# Patient Record
Sex: FEMALE | Race: WHITE | NOT HISPANIC OR LATINO | Employment: OTHER | URBAN - METROPOLITAN AREA
[De-identification: names, ages, dates, MRNs, and addresses within clinical notes are randomized per-mention and may not be internally consistent; named-entity substitution may affect disease eponyms.]

---

## 2017-01-10 ENCOUNTER — PATIENT OUTREACH (OUTPATIENT)
Dept: OTHER | Facility: OTHER | Age: 62
End: 2017-01-10
Payer: COMMERCIAL

## 2017-01-10 NOTE — PROGRESS NOTES
Last 5 Patient Entered Readings                                                               Current 30 Day Average: 119/75     Recent Readings 12/14/2016 12/14/2016 12/2/2016 12/2/2016 11/23/2016    Systolic BP (mmHg) 120 119 118 114 112    Diastolic BP (mmHg) 78 73 66 67 61    Pulse 83 84 77 79 84        LVM to follow up with Mrs. Jyoti Tyler. Inquired about the lack of readings and reminded her that we need to see readings at least once per week. Requested that she start taking readings again, make sure she is still logged into her [employer] jennifer and to let me know if she has experiencing any other IT issues. Advised pt to call or message back if she has any questions or concerns.

## 2017-01-13 ENCOUNTER — PATIENT MESSAGE (OUTPATIENT)
Dept: ALLERGY | Facility: CLINIC | Age: 62
End: 2017-01-13

## 2017-01-13 RX ORDER — OMEPRAZOLE 40 MG/1
40 CAPSULE, DELAYED RELEASE ORAL DAILY
Qty: 30 CAPSULE | Refills: 0 | Status: SHIPPED | OUTPATIENT
Start: 2017-01-13 | End: 2017-07-12 | Stop reason: SDUPTHER

## 2017-01-19 NOTE — PROGRESS NOTES
Last 5 Patient Entered Readings                                                               Current 30 Day Average: /     Recent Readings 12/14/2016 12/14/2016 12/2/2016 12/2/2016 11/23/2016    Systolic BP (mmHg) 120 119 118 114 112    Diastolic BP (mmHg) 78 73 66 67 61    Pulse 83 84 77 79 84        Sent Galenea message to follow up with Mrs. Jyoti Tyler. Inquiring again about her lack of readings and requested that she start taking them. Informed her to let me know if she is having any IT issues so we can assist her. Advised pt to call or message back if he has any questions or concerns.

## 2017-02-08 ENCOUNTER — PATIENT OUTREACH (OUTPATIENT)
Dept: OTHER | Facility: OTHER | Age: 62
End: 2017-02-08
Payer: COMMERCIAL

## 2017-02-08 NOTE — PROGRESS NOTES
Last 5 Patient Entered Readings                                                               Current 30 Day Average: 107/68     Recent Readings 1/19/2017 12/14/2016 12/14/2016 12/2/2016 12/2/2016    Systolic BP (mmHg) 107 120 119 118 114    Diastolic BP (mmHg) 68 78 73 66 67    Pulse 82 83 84 77 79        LVM to follow up with Mrs. Jyoti Tyler. Inquired about how her low sodium diet and exercise is going. Wanted to remind her to take her weekly readings and that a requirement of the program is to take at least one week per week. Also inquired about whether or not she is still interested in being in the program. Will continue to follow. Advised pt to call or message back if she has any questions or concerns.

## 2017-03-07 ENCOUNTER — PATIENT OUTREACH (OUTPATIENT)
Dept: OTHER | Facility: OTHER | Age: 62
End: 2017-03-07
Payer: COMMERCIAL

## 2017-03-15 NOTE — PROGRESS NOTES
Called patient to review readings. LVM.  BP at goal  Continue monitoring    Last 5 Patient Entered Readings                                                               Current 30 Day Average: 113/69     Recent Readings 3/12/2017 3/12/2017 2/22/2017 2/22/2017 2/14/2017    Systolic BP (mmHg) 111 113 116 117 108    Diastolic BP (mmHg) 67 67 71 70 61    Pulse 79 81 76 76 76

## 2017-03-24 ENCOUNTER — PATIENT OUTREACH (OUTPATIENT)
Dept: OTHER | Facility: OTHER | Age: 62
End: 2017-03-24
Payer: COMMERCIAL

## 2017-03-24 NOTE — PROGRESS NOTES
Last 5 Patient Entered Readings                                                               Current 30 Day Average: 111/67     Recent Readings 3/21/2017 3/21/2017 3/12/2017 3/12/2017 2/22/2017    Systolic BP (mmHg) 105 110 111 113 116    Diastolic BP (mmHg) 60 69 67 67 71    Pulse 80 80 79 81 76        LVM to follow up with Mrs. Jyoti Tyler. Inquired about how her low sodium diet and exercise is going. Overall, her readings are looking good and I encouraged her to continue taking them weekly. Advised pt to call or message back if she has any questions or concerns.

## 2017-03-30 DIAGNOSIS — I10 ESSENTIAL HYPERTENSION: ICD-10-CM

## 2017-04-04 RX ORDER — VALSARTAN 160 MG/1
TABLET ORAL
Qty: 30 TABLET | Refills: 1 | Status: SHIPPED | OUTPATIENT
Start: 2017-04-04 | End: 2017-04-20 | Stop reason: SDUPTHER

## 2017-04-20 ENCOUNTER — PATIENT OUTREACH (OUTPATIENT)
Dept: OTHER | Facility: OTHER | Age: 62
End: 2017-04-20
Payer: COMMERCIAL

## 2017-04-20 DIAGNOSIS — I10 ESSENTIAL HYPERTENSION: ICD-10-CM

## 2017-04-20 RX ORDER — VALSARTAN 160 MG/1
160 TABLET ORAL DAILY
Qty: 90 TABLET | Refills: 2 | Status: SHIPPED | OUTPATIENT
Start: 2017-04-20 | End: 2017-07-12 | Stop reason: SDUPTHER

## 2017-04-20 NOTE — PROGRESS NOTES
Patient called to request refill.   BP at goal  Continue monitoring    Last 5 Patient Entered Readings                                                               Current 30 Day Average: 118/73     Recent Readings 4/15/2017 4/15/2017 4/10/2017 4/10/2017 3/31/2017    Systolic BP (mmHg) 134 139 135 133 108    Diastolic BP (mmHg) 78 82 88 89 71    Pulse 78 80 80 85 76

## 2017-05-01 ENCOUNTER — OFFICE VISIT (OUTPATIENT)
Dept: OPTOMETRY | Facility: CLINIC | Age: 62
End: 2017-05-01
Payer: COMMERCIAL

## 2017-05-01 DIAGNOSIS — L03.213 PRESEPTAL CELLULITIS OF LEFT UPPER EYELID: Primary | ICD-10-CM

## 2017-05-01 PROCEDURE — 99999 PR PBB SHADOW E&M-EST. PATIENT-LVL II: CPT | Mod: PBBFAC,,, | Performed by: OPTOMETRIST

## 2017-05-01 PROCEDURE — 92012 INTRM OPH EXAM EST PATIENT: CPT | Mod: S$GLB,,, | Performed by: OPTOMETRIST

## 2017-05-01 RX ORDER — HYDROCHLOROTHIAZIDE 25 MG/1
25 TABLET ORAL DAILY
Refills: 1 | COMMUNITY
Start: 2017-04-20 | End: 2017-06-30 | Stop reason: SDUPTHER

## 2017-05-01 RX ORDER — AMOXICILLIN 500 MG/1
500 TABLET, FILM COATED ORAL EVERY 12 HOURS
Qty: 20 TABLET | Refills: 0 | Status: SHIPPED | OUTPATIENT
Start: 2017-05-01 | End: 2017-05-11

## 2017-05-01 NOTE — PROGRESS NOTES
HPI     Patient's last dilated exam was: 8/29/2016  Pt states: woke up Sunday morning and her HANNAH was swollen, tenser/sore.   Has not treated. Went to a crawfish boil the day before, does not think it   is from that. Patient denies flashes, floaters, and double vision.          Last edited by NOÉ Ward on 5/1/2017  1:20 PM.     ROS     Positive for: Eyes    Negative for: Constitutional, Gastrointestinal, Neurological, Skin,   Genitourinary, Musculoskeletal, HENT, Endocrine, Cardiovascular,   Respiratory, Psychiatric, Allergic/Imm, Heme/Lymph    Last edited by Letty Liriano, KELLY on 5/1/2017  1:52 PM. (History)        Assessment /Plan     For exam results, see Encounter Report.    Preseptal cellulitis of left upper eyelid  -     amoxicillin (AMOXIL) 500 MG Tab; Take 1 tablet (500 mg total) by mouth every 12 (twelve) hours.  Dispense: 20 tablet; Refill: 0            1.    Amoxicillin 500mg PO bid x 10 days-educated pt if condition continues to worsen RTC immediately.  Bifocal rx given.  Phone review 5 days.

## 2017-05-05 ENCOUNTER — TELEPHONE (OUTPATIENT)
Dept: OPTOMETRY | Facility: CLINIC | Age: 62
End: 2017-05-05

## 2017-05-24 ENCOUNTER — PATIENT OUTREACH (OUTPATIENT)
Dept: OTHER | Facility: OTHER | Age: 62
End: 2017-05-24
Payer: COMMERCIAL

## 2017-05-24 NOTE — PROGRESS NOTES
Last 5 Patient Entered Readings                                                               Current 30 Day Average: 112/65     Recent Readings 5/16/2017 5/16/2017 5/12/2017 5/12/2017 5/6/2017    Systolic BP (mmHg) 128 122 102 105 119    Diastolic BP (mmHg) 65 69 63 70 59    Pulse 83 85 80 80 60        LVM to follow up with Mrs. Jyoti Tyler. Inquired about how her low sodium diet and exercise is going. Overall, his readings are looking good so I just reminded her to take her readings weekly. She has done better with this so I want to keep her on track. Advised pt to call or message back if she has any questions or concerns.

## 2017-06-27 ENCOUNTER — PATIENT OUTREACH (OUTPATIENT)
Dept: OTHER | Facility: OTHER | Age: 62
End: 2017-06-27

## 2017-06-27 NOTE — PROGRESS NOTES
Last 5 Patient Entered Readings                                                               Current 30 Day Average: 121/74     Recent Readings 6/23/2017 6/23/2017 6/14/2017 6/14/2017 6/4/2017    Systolic BP (mmHg) 126 136 114 113 126    Diastolic BP (mmHg) 82 83 72 73 83    Pulse 80 81 77 78 75          Follow up with Mrs. Jyoti Tyler completed. Patient is maintaining a low sodium diet and continuing her exercise regime. She reports that she is doing well and feeling great. She continues taking her readings weekly. Patient did not have any further questions or concerns. I will follow up in a few weeks to see how she is doing and progressing.

## 2017-06-30 DIAGNOSIS — I10 ESSENTIAL HYPERTENSION: Primary | ICD-10-CM

## 2017-06-30 RX ORDER — HYDROCHLOROTHIAZIDE 25 MG/1
25 TABLET ORAL DAILY
Qty: 90 TABLET | Refills: 2 | Status: SHIPPED | OUTPATIENT
Start: 2017-06-30 | End: 2017-07-12 | Stop reason: SDUPTHER

## 2017-06-30 NOTE — TELEPHONE ENCOUNTER
Patient requests HCTZ RX be sent to [facility] on [address].   BP at goal  Continue monitoring

## 2017-07-12 ENCOUNTER — HOSPITAL ENCOUNTER (OUTPATIENT)
Dept: RADIOLOGY | Facility: HOSPITAL | Age: 62
Discharge: HOME | End: 2017-07-12
Attending: INTERNAL MEDICINE
Payer: COMMERCIAL

## 2017-07-12 ENCOUNTER — OFFICE VISIT (OUTPATIENT)
Dept: INTERNAL MEDICINE | Facility: CLINIC | Age: 62
End: 2017-07-12
Payer: COMMERCIAL

## 2017-07-12 VITALS — BODY MASS INDEX: 27.31 KG/M2 | WEIGHT: 160 LBS | HEIGHT: 64 IN

## 2017-07-12 VITALS
DIASTOLIC BLOOD PRESSURE: 62 MMHG | HEIGHT: 64 IN | WEIGHT: 160.5 LBS | HEART RATE: 84 BPM | SYSTOLIC BLOOD PRESSURE: 110 MMHG | BODY MASS INDEX: 27.4 KG/M2

## 2017-07-12 DIAGNOSIS — I10 ESSENTIAL HYPERTENSION: ICD-10-CM

## 2017-07-12 DIAGNOSIS — R93.89 ABNORMAL CT SCAN, CHEST: ICD-10-CM

## 2017-07-12 DIAGNOSIS — R91.1 PULMONARY NODULE: ICD-10-CM

## 2017-07-12 DIAGNOSIS — Z12.39 SCREENING FOR BREAST CANCER: ICD-10-CM

## 2017-07-12 DIAGNOSIS — M85.80 OSTEOPENIA, UNSPECIFIED LOCATION: ICD-10-CM

## 2017-07-12 DIAGNOSIS — K21.9 LPRD (LARYNGOPHARYNGEAL REFLUX DISEASE): ICD-10-CM

## 2017-07-12 DIAGNOSIS — Z79.899 LONG-TERM USE OF HIGH-RISK MEDICATION: ICD-10-CM

## 2017-07-12 DIAGNOSIS — Z00.00 ANNUAL PHYSICAL EXAM: Primary | ICD-10-CM

## 2017-07-12 PROCEDURE — 99999 PR PBB SHADOW E&M-EST. PATIENT-LVL IV: CPT | Mod: PBBFAC,,, | Performed by: INTERNAL MEDICINE

## 2017-07-12 PROCEDURE — 77067 SCR MAMMO BI INCL CAD: CPT | Mod: 26,,, | Performed by: RADIOLOGY

## 2017-07-12 PROCEDURE — 77067 SCR MAMMO BI INCL CAD: CPT | Mod: TC

## 2017-07-12 PROCEDURE — 99396 PREV VISIT EST AGE 40-64: CPT | Mod: S$GLB,,, | Performed by: INTERNAL MEDICINE

## 2017-07-12 RX ORDER — HYDROCHLOROTHIAZIDE 25 MG/1
25 TABLET ORAL DAILY
Qty: 90 TABLET | Refills: 3 | Status: SHIPPED | OUTPATIENT
Start: 2017-07-12 | End: 2018-07-13 | Stop reason: SDUPTHER

## 2017-07-12 RX ORDER — OMEPRAZOLE 20 MG/1
40 CAPSULE, DELAYED RELEASE ORAL
Qty: 180 CAPSULE | Refills: 1 | Status: SHIPPED | OUTPATIENT
Start: 2017-07-12 | End: 2019-06-19

## 2017-07-12 RX ORDER — VALSARTAN 80 MG/1
80 TABLET ORAL EVERY MORNING
Qty: 90 TABLET | Refills: 3 | Status: SHIPPED | OUTPATIENT
Start: 2017-07-12 | End: 2018-07-13 | Stop reason: SDUPTHER

## 2017-07-12 NOTE — PATIENT INSTRUCTIONS
How Acid Reflux Affects Your Throat    Do you have to clear your throat or cough often? Are you hoarse? Do you have trouble swallowing? If you have these or other throat symptoms, you may have acid reflux. This occurs when stomach acid flows back up and irritates your throat.  Why you have throat symptoms  There are muscles (esophageal sphincters) at both ends of the tube that carries food to your stomach (the esophagus). These muscles relax to let food pass. Then they tighten to keep stomach acid down. When the lower esophageal sphincter (LES) doesnt tighten enough, acid can flow back (reflux) from your stomach into your esophagus. This may cause heartburn. In some cases the upper esophageal sphincter (UES) also doesnt work well. Then acid can travel higher and enter your throat (pharynx). In many cases, this causes throat symptoms.  Common throat symptoms  · Need to clear your throat often  · Feeling like youre choking  · Long-term (chronic) cough  · Hoarseness  · Trouble swallowing  · Feel like you have a lump in your throat  · Sour or acid taste  · Sore throat that keeps coming back   Date Last Reviewed: 7/1/2016  © 6905-1358 [employer]. [address] [place] [address], [place] [address]. All rights reserved. This information is not intended as a substitute for professional medical care. Always follow your healthcare professional's instructions.        Tips to Control Acid Reflux    To control acid reflux, youll need to make some basic diet and lifestyle changes. The simple steps outlined below may be all youll need to ease discomfort.  Watch what you eat  · Avoid fatty foods and spicy foods.  · Eat fewer acidic foods, such as citrus and tomato-based foods. These can increase symptoms.  · Limit drinking alcohol, caffeine, and fizzy beverages. All increase acid reflux.  · Try limiting chocolate, peppermint, and spearmint. These can worsen acid reflux in some people.  Watch when you eat  · Avoid lying  down for 3 hours after eating.  · Do not snack before going to bed.  Raise your head  Raising your head and upper body by 4 to 6 inches helps limit reflux when youre lying down. Put blocks under the head of your bed frame to raise it.  Other changes  · Lose weight, if you need to  · Dont exercise near bedtime  · Avoid tight-fitting clothes  · Limit aspirin and ibuprofen  · Stop smoking   Date Last Reviewed: 7/1/2016 © 2000-2016 [employer]. [address] [place] [address], [place] [address]. All rights reserved. This information is not intended as a substitute for professional medical care. Always follow your healthcare professional's instructions.        Lifestyle Changes for Controlling GERD    When you have GERD, stomach acid feels as if its backing up toward your mouth. Whether or not you take medicine to control your GERD, your symptoms can often be improved with lifestyle changes. Talk to your healthcare provider about the following suggestions. These suggestions may help you get relief from your symptoms.  Raise your head  Reflux is more likely to strike when youre lying down flat, because stomach fluid can flow backward more easily. Raising the head of your bed 4 to 6 inches can help. To do this:  · Slide blocks or books under the legs at the head of your bed. Or, place a wedge under the mattress. Many [employer] stores can make a suitable wedge for you. The wedge should run from your waist to the top of your head.  · Dont just prop your head on several pillows. This increases pressure on your stomach. It can make GERD worse.  Watch your eating habits  Certain foods may increase the acid in your stomach or relax the lower esophageal sphincter. This makes GERD more likely. Its best to avoid the following if they cause you symptoms:  · Coffee, tea, and carbonated drinks (with and without caffeine)  · Fatty, fried, or spicy food  · Mint, chocolate, onions, and tomatoes  · Peppermint  · Any other foods  that seem to irritate your stomach or cause you pain  Relieve the pressure  Tips include the following:  · Eat smaller meals, even if you have to eat more often.  · Dont lie down right after you eat. Wait a few hours for your stomach to empty.  · Avoid tight belts and tight-fitting clothes.  · Lose excess weight.  Tobacco and alcohol  Avoid smoking tobacco and drinking alcohol. They can make GERD symptoms worse.  Date Last Reviewed: 7/1/2016  © 4349-5027 [employer]. [address] [place] [address], [place] [address]. All rights reserved. This information is not intended as a substitute for professional medical care. Always follow your healthcare professional's instructions.

## 2017-07-14 NOTE — PROGRESS NOTES
Subjective:       Patient ID: Jyoti Tyler is a 62 y.o. female.    Chief Complaint: Annual Exam   wellness  Entire chart reviewed, PMx, FHx, and Social History updated and reviewed.    HPI  Review of Systems   Constitutional: Negative for activity change, appetite change, chills, fatigue, fever and unexpected weight change.   HENT: Negative for dental problem, hearing loss, rhinorrhea, tinnitus, trouble swallowing and voice change.    Eyes: Negative for discharge and visual disturbance.   Respiratory: Negative for cough, chest tightness, shortness of breath and wheezing.    Cardiovascular: Negative for chest pain, palpitations and leg swelling.   Gastrointestinal: Negative for abdominal pain, blood in stool, constipation, diarrhea, nausea and vomiting.   Endocrine: Negative for polydipsia and polyuria.   Genitourinary: Negative for difficulty urinating, dysuria, frequency, hematuria, menstrual problem and urgency.   Musculoskeletal: Negative for arthralgias, back pain, gait problem, joint swelling, myalgias, neck pain and neck stiffness.   Skin: Negative for rash.   Neurological: Negative for dizziness, tremors, speech difficulty, weakness, light-headedness and headaches.   Psychiatric/Behavioral: Negative for confusion, dysphoric mood and sleep disturbance. The patient is not nervous/anxious.        Objective:      Physical Exam   Constitutional: She appears well-nourished.   HENT:   Head: Atraumatic.   Eyes: Conjunctivae are normal. No scleral icterus.   Neck: Neck supple.   Cardiovascular: Normal rate and regular rhythm.    Pulmonary/Chest: Effort normal and breath sounds normal.   Abdominal: Soft. There is no tenderness.   Musculoskeletal: She exhibits no edema.   Lymphadenopathy:     She has no cervical adenopathy.   Neurological: She is alert.   Skin: Skin is warm and dry.   Psychiatric: She has a normal mood and affect. Her behavior is normal.       Assessment:       1. Annual physical exam    2.  Screening for breast cancer    3. Essential hypertension    4. LPRD (laryngopharyngeal reflux disease)    5. Long-term use of high-risk medication, PPI    6. Osteopenia, unspecified location    7. Pulmonary nodule    8. Abnormal CT scan, chest        Plan:   Jyoti was seen today for annual exam.    Diagnoses and all orders for this visit:    Annual physical exam  -     CBC auto differential; Future  -     Comprehensive metabolic panel; Future  -     Lipid panel; Future  -     TSH; Future    Screening for breast cancer  -     Mammo Digital Screening Bilat with CAD; Future    Essential hypertension  -     valsartan (DIOVAN) 80 MG tablet; Take 1 tablet (80 mg total) by mouth every morning.  -     hydrochlorothiazide (HYDRODIURIL) 25 MG tablet; Take 1 tablet (25 mg total) by mouth once daily.    LPRD (laryngopharyngeal reflux disease)    Long-term use of high-risk medication, PPI    Osteopenia, unspecified location  -     DXA Bone Density Spine And Hip; Future    Pulmonary nodule  Comments:  two toyin 07-  screenig for CAD, CALCIUM SCORE  Orders:  -     CT Chest Without Contrast; Future    Abnormal CT scan, chest  -     CT Chest Without Contrast; Future    Other orders  -     omeprazole (PRILOSEC) 20 MG capsule; Take 2 capsules (40 mg total) by mouth 2 (two) times daily before meals. As needed, trial to wean off  -     ranitidine (ZANTAC) 150 MG tablet; Take 1 tablet (150 mg total) by mouth 2 (two) times daily.  -     calcium carbonate-magnesium hydroxide (ROLAIDS) 550-110 mg Chew; Take 1 tablet by mouth 2 (two) times daily with meals.    Return in about 6 months (around 1/12/2018).

## 2017-08-08 ENCOUNTER — PATIENT OUTREACH (OUTPATIENT)
Dept: OTHER | Facility: OTHER | Age: 62
End: 2017-08-08

## 2017-08-08 NOTE — PROGRESS NOTES
Last 5 Patient Entered Red[place] Current 30 Day Average: 122/69     Recent Readings 7/27/2017 7/27/2017 7/18/2017 7/18/2017 7/1/2017    Systolic BP (mmHg) 133 129 110 121 115    Diastolic BP (mmHg) 77 81 60 67 63    Pulse 76 83 85 88 79          LVM to follow up with Mrs. Jyoti Tyler. Inquired about how her low sodium diet and exercise is going. Advised pt to call or message back if she has any questions or concerns.    Are the patients BP readings overall controlled? yes  Does the patient take weekly BP readings? no : Patient was doing better with this but I reminded her to continue working on getting at least one reading in every week so we can continue monitoring her.   Did you request for the patient to call or message you back? no  If yes, then why? NA    Reminded patient about what to do incase of a medical emergency (call 911, call [facility] on call or go to the ER).     Provided patient with my contact information.

## 2017-08-22 ENCOUNTER — PATIENT OUTREACH (OUTPATIENT)
Dept: OTHER | Facility: OTHER | Age: 62
End: 2017-08-22

## 2017-08-22 NOTE — PROGRESS NOTES
Jyoti Tyler continues to monitor BP readings. Tolerating medications without problems. No questions or concerns.    Last 5 Patient Entered Redings Current 30 Day Average: 126/70     Recent Readings 8/16/2017 8/16/2017 7/27/2017 7/27/2017 7/18/2017    Systolic BP (mmHg) 118 110 133 129 110    Diastolic BP (mmHg) 62 61 77 81 60    Pulse 81 83 76 83 85          BP at goal  Continue monitoring    Hypertension Medications             hydrochlorothiazide (HYDRODIURIL) 25 MG tablet Take 1 tablet (25 mg total) by mouth once daily.    valsartan (DIOVAN) 80 MG tablet Take 1 tablet (80 mg total) by mouth every morning.

## 2017-09-05 ENCOUNTER — PATIENT OUTREACH (OUTPATIENT)
Dept: OTHER | Facility: OTHER | Age: 62
End: 2017-09-05

## 2017-09-29 ENCOUNTER — PATIENT OUTREACH (OUTPATIENT)
Dept: OTHER | Facility: OTHER | Age: 62
End: 2017-09-29

## 2017-09-29 NOTE — PROGRESS NOTES
Last 5 Patient Entered Redings Current 30 Day Average: 128/67     Recent Readings 9/12/2017 9/12/2017 8/27/2017 8/27/2017 8/27/2017    Systolic BP (mmHg) 128 129 114 131 110    Diastolic BP (mmHg) 67 73 73 74 74    Pulse 74 78 74 80 78          Hypertension Digital Medicine Program (HDMP): Health  Follow Up    Lifestyle Modifications:    1.Low sodium diet: yes    2.Physical activity: yes     3.Hypotension/Hypertension symptoms: no   Frequency/Alleviating factors/Precipitating factors, etc.     4.Patient has been compliant with the medication regimen.     Patient informed me that she sometimes has issues getting her cuff to connect to her phone through the bluetooth. She is going to try and take her BP reading this week and she will let me know if she continues to have issues. If so, I will have IT reach out or she may need to bring the cuff to the [facility].     Follow up with Mrs. Jyoti Tyler completed. No further questions or concerns. I will follow up in a few weeks to assess progress.

## 2017-10-24 ENCOUNTER — PATIENT OUTREACH (OUTPATIENT)
Dept: OTHER | Facility: OTHER | Age: 62
End: 2017-10-24

## 2017-10-24 NOTE — PROGRESS NOTES
Last 5 Patient Entered Redings Current 30 Day Average: 126/76     Recent Readings 10/15/2017 10/15/2017 10/4/2017 10/4/2017 9/12/2017    Systolic BP (mmHg) 118 115 134 132 128    Diastolic BP (mmHg) 65 63 87 81 67    Pulse 80 80 83 85 74          Hypertension Digital Medicine Program (HDMP): Health  Follow Up    Lifestyle Modifications:    1.Low sodium diet: Deferred    2.Physical activity: Deferred    3.Hypotension/Hypertension symptoms: no   Frequency/Alleviating factors/Precipitating factors, etc.     4.Patient has been compliant with the medication regimen.     Patient continues to forget to take her readings because she is busy. She will continue to work on this. Patient was unable to talk to long but she will call me if she has any other issues with her cuff.     Follow up with Mrs. Jyoti Tyler completed. No further questions or concerns. I will follow up in a few weeks to assess progress.

## 2017-11-27 ENCOUNTER — PATIENT OUTREACH (OUTPATIENT)
Dept: OTHER | Facility: OTHER | Age: 62
End: 2017-11-27

## 2017-11-27 NOTE — PROGRESS NOTES
Last 5 Patient Entered Readings Current 30 Day Average: 122/73     Recent Readings 11/13/2017 10/30/2017 10/30/2017 10/26/2017 10/26/2017    Systolic BP (mmHg) 118 126 124 122 117    Diastolic BP (mmHg) 67 79 80 70 74    Pulse 82 84 83 86 87          Hypertension Digital Medicine (HDMP) Health  Follow Up    LVM to follow up with Mrs. Jyoti Tyler.    Per 30 day average, blood pressure is well controlled 122/73 mmHg but she does not take readings consistently. Reminded her that it is a requirement of the program for her to take 1-2 readings per week. Encouraged adherence to low sodium diet and physical activity guidelines. Advised patient to call or message with questions or concerns.

## 2017-12-28 ENCOUNTER — PATIENT OUTREACH (OUTPATIENT)
Dept: OTHER | Facility: OTHER | Age: 62
End: 2017-12-28

## 2017-12-28 NOTE — PROGRESS NOTES
Jyoti Tyler continues to monitor BP readings. Tolerating medications without problems. Has forgotten to take readings, but will do so soon.  No questions or concerns.    Last 5 Patient Entered Readings Current 30 Day Average: 113/71     Recent Readings 12/19/2017 12/19/2017 12/11/2017 12/4/2017 11/13/2017    SBP (mmHg) 122 123 103 113 118    DBP (mmHg) 78 75 66 69 67    Pulse 84 83 81 77 82          BP at goal, <130/80 mmHg  Continue monitoring    Hypertension Medications             hydrochlorothiazide (HYDRODIURIL) 25 MG tablet Take 1 tablet (25 mg total) by mouth once daily.    valsartan (DIOVAN) 80 MG tablet Take 1 tablet (80 mg total) by mouth every morning.

## 2018-01-30 ENCOUNTER — PATIENT OUTREACH (OUTPATIENT)
Dept: OTHER | Facility: OTHER | Age: 63
End: 2018-01-30

## 2018-01-30 NOTE — PROGRESS NOTES
Last 5 Patient Entered Readings                                      Current 30 Day Average: 123/83     Recent Readings 1/23/2018 1/23/2018 1/9/2018 12/28/2017 12/19/2017    SBP (mmHg) 122 121 123 106 122    DBP (mmHg) 78 72 88 67 78    Pulse 79 70 84 82 84          Hypertension Digital Medicine (HDMP) Health  Follow Up    LVM to follow up with Mrs. Jyoti Tyler.    Per 30 day average, blood pressure is well controlled 123/83 mmHg (I believe her average is a little off due to lack of readings and her diastolic number being high on 1/9). Encouraged adherence to low sodium diet and physical activity guidelines. Advised patient to call or message with questions or concerns. Requested she start taking 1-3 BP readings per week.

## 2018-02-26 ENCOUNTER — PATIENT OUTREACH (OUTPATIENT)
Dept: OTHER | Facility: OTHER | Age: 63
End: 2018-02-26

## 2018-02-26 NOTE — PROGRESS NOTES
Last 5 Patient Entered Readings                                      Current 30 Day Average: 141/89     Recent Readings 2/20/2018 1/23/2018 1/23/2018 1/9/2018 12/28/2017    SBP (mmHg) 141 122 121 123 106    DBP (mmHg) 89 78 72 88 67    Pulse 85 79 70 84 82          Hypertension Digital Medicine (HDMP) Health  Follow Up    LVM to follow up with Mrs. Jyoti Tyler.    Per 30 day average, blood pressure is not well controlled 141/89 mmHg. Requested patient takes more readings each month (2-3 a week). Also reminded her that this is a requirement of our program. Encouraged adherence to low sodium diet and physical activity guidelines. Requested patient call or message back so we can discuss her readings/obtain an update. Will continue to monitor/follow up.

## 2018-03-22 NOTE — PROGRESS NOTES
Last 5 Patient Entered Readings                                      Current 30 Day Average: 128/80     Recent Readings 3/20/2018 3/20/2018 3/7/2018 3/7/2018 3/1/2018    SBP (mmHg) 124 121 127 109 121    DBP (mmHg) 80 77 77 68 72    Pulse 76 76 80 79 80          Hypertension Digital Medicine (HDMP) Health  Follow Up    LVM to follow up with Mrs. Jyoti Eubanks Nayeli.    Per 30 day average, blood pressure is controlled 128/80 mmHg. Encouraged adherence to low sodium diet and physical activity guidelines. Advised patient to call or message with questions or concerns.

## 2018-04-06 ENCOUNTER — PATIENT OUTREACH (OUTPATIENT)
Dept: OTHER | Facility: OTHER | Age: 63
End: 2018-04-06

## 2018-04-06 NOTE — PROGRESS NOTES
Last 5 Patient Entered Readings                                      Current 30 Day Average: 126/79     Recent Readings 3/20/2018 3/20/2018 3/7/2018 3/7/2018 3/1/2018    SBP (mmHg) 124 121 127 109 121    DBP (mmHg) 80 77 77 68 72    Pulse 76 76 80 79 80          Hypertension Digital Medicine (HDMP) Health  Follow Up    LVM to follow up with Melida Jyoti Eubanks Nayeli.    Per 30 day average, blood pressure is not well controlled due to a lack of readings. Encouraged adherence to low sodium diet and physical activity guidelines. Requested patient call or message back so we can discuss her readings/obtain an update. Will continue to monitor/follow up.

## 2018-05-08 NOTE — PROGRESS NOTES
Last 5 Patient Entered Readings                                      Current 30 Day Average:      Recent Readings 3/20/2018 3/20/2018 3/7/2018 3/7/2018 3/1/2018    SBP (mmHg) 124 121 127 109 121    DBP (mmHg) 80 77 77 68 72    Pulse 76 76 80 79 80            Digital Medicine: Health  Follow Up    Left voicemail to follow up with Mrs. Jyoti Tyler.  Starting non-compliance protocol today.

## 2018-05-23 NOTE — PROGRESS NOTES
Last 5 Patient Entered Readings                                      Current 30 Day Average:      Recent Readings 3/20/2018 3/20/2018 3/7/2018 3/7/2018 3/1/2018    SBP (mmHg) 124 121 127 109 121    DBP (mmHg) 80 77 77 68 72    Pulse 76 76 80 79 80          Called and spoke with patient about her lack of BP readings. She told me that she just took a reading on her [place] cuff a few days ago. I asked if she were still logged into her [employer] jennifer but she looked on her phone and told me that she did not have that jennifer on her phone and does not think she ever did. I will have our , Linnea, reach out for further assistance. I encouraged the patient to continue taking BP readings. She also informed me that she is doing well and that her BP is running in the 110's.

## 2018-06-01 NOTE — PROGRESS NOTES
Reviewed readings. BP well managed, goal <130/80 mmHg  Continue monitoring    Last 5 Patient Entered Readings                                      Current 30 Day Average: 116/73     Recent Readings 5/24/2018 3/20/2018 3/20/2018 3/7/2018 3/7/2018    SBP (mmHg) 116 124 121 127 109    DBP (mmHg) 73 80 77 77 68    Pulse 74 76 76 80 79

## 2018-06-20 ENCOUNTER — PATIENT OUTREACH (OUTPATIENT)
Dept: OTHER | Facility: OTHER | Age: 63
End: 2018-06-20

## 2018-06-20 NOTE — PROGRESS NOTES
Last 5 Patient Entered Readings                                      Current 30 Day Average: 114/74     Recent Readings 6/18/2018 6/18/2018 5/24/2018 3/20/2018 3/20/2018    SBP (mmHg) 112 120 116 124 121    DBP (mmHg) 74 75 73 80 77    Pulse 78 80 74 76 76          Digital Medicine: Health  Follow Up    Lifestyle Modifications:    1.Dietary Modifications (Sodium intake <2,000mg/day, food labels, dining out): Deferred    2.Physical Activity: Deferred    3.Medication Therapy: Patient has been compliant with the medication regimen. Patient reports that she is doing well on her current regimen.     4.Patient has the following medication side effects/concerns:   (Frequency/Alleviating factors/Precipitating factors, etc.)     Requested that she work on getting more readings in each week.     Follow up with Mrs. Jyoti Tyler completed. No further questions or concerns. Will continue follow up to achieve health goals.

## 2018-07-13 ENCOUNTER — PATIENT OUTREACH (OUTPATIENT)
Dept: OTHER | Facility: OTHER | Age: 63
End: 2018-07-13

## 2018-07-13 DIAGNOSIS — I10 ESSENTIAL HYPERTENSION: ICD-10-CM

## 2018-07-13 RX ORDER — VALSARTAN 80 MG/1
80 TABLET ORAL EVERY MORNING
Qty: 90 TABLET | Refills: 3 | Status: SHIPPED | OUTPATIENT
Start: 2018-07-13 | End: 2018-07-17 | Stop reason: ALTCHOICE

## 2018-07-13 RX ORDER — HYDROCHLOROTHIAZIDE 25 MG/1
25 TABLET ORAL DAILY
Qty: 90 TABLET | Refills: 3 | Status: SHIPPED | OUTPATIENT
Start: 2018-07-13 | End: 2018-07-17 | Stop reason: SDUPTHER

## 2018-07-13 RX ORDER — HYDROCHLOROTHIAZIDE 25 MG/1
25 TABLET ORAL DAILY
Qty: 90 TABLET | Refills: 3 | Status: SHIPPED | OUTPATIENT
Start: 2018-07-13 | End: 2018-07-13 | Stop reason: SDUPTHER

## 2018-07-13 RX ORDER — VALSARTAN 80 MG/1
80 TABLET ORAL EVERY MORNING
Qty: 90 TABLET | Refills: 3 | Status: SHIPPED | OUTPATIENT
Start: 2018-07-13 | End: 2018-07-13 | Stop reason: SDUPTHER

## 2018-07-13 NOTE — PROGRESS NOTES
Last 5 Patient Entered Readings                                      Current 30 Day Average: 112/74     Recent Readings 6/18/2018 6/18/2018 5/24/2018 3/20/2018 3/20/2018    SBP (mmHg) 112 120 116 124 121    DBP (mmHg) 74 75 73 80 77    Pulse 78 80 74 76 76          Patient called and M for me asking that I give her a call back. I attempted to reach her but had to M as well. Requested that she call me back at her earliest convenience.     Spoke with patient after she called back again. She just needs a refill on her Valsartan and HCTZ. She would like it sent to the [facility] on [address]. I will inform her PharmD, CANDY Jamison, of this information.

## 2018-07-13 NOTE — TELEPHONE ENCOUNTER
Dr. Eaton, did you intend for all 3 of her scripts to print? Cause I just took them off the printer.    Please advise  Thank you  JERONIMO Betts

## 2018-07-17 ENCOUNTER — PATIENT OUTREACH (OUTPATIENT)
Dept: OTHER | Facility: OTHER | Age: 63
End: 2018-07-17

## 2018-07-17 DIAGNOSIS — I10 ESSENTIAL HYPERTENSION: Primary | ICD-10-CM

## 2018-07-17 RX ORDER — IRBESARTAN 75 MG/1
75 TABLET ORAL DAILY
Qty: 90 TABLET | Refills: 1 | Status: SHIPPED | OUTPATIENT
Start: 2018-07-17 | End: 2018-07-20 | Stop reason: SDUPTHER

## 2018-07-17 RX ORDER — HYDROCHLOROTHIAZIDE 25 MG/1
25 TABLET ORAL DAILY
Qty: 90 TABLET | Refills: 1 | OUTPATIENT
Start: 2018-07-17 | End: 2018-07-20 | Stop reason: SDUPTHER

## 2018-07-20 RX ORDER — IRBESARTAN 75 MG/1
75 TABLET ORAL DAILY
Qty: 90 TABLET | Refills: 1 | Status: SHIPPED | OUTPATIENT
Start: 2018-07-20 | End: 2018-07-20 | Stop reason: ALTCHOICE

## 2018-07-20 RX ORDER — HYDROCHLOROTHIAZIDE 25 MG/1
25 TABLET ORAL DAILY
Qty: 90 TABLET | Refills: 1 | OUTPATIENT
Start: 2018-07-20 | End: 2019-01-10 | Stop reason: SDUPTHER

## 2018-07-20 RX ORDER — LOSARTAN POTASSIUM 50 MG/1
50 TABLET ORAL DAILY
Qty: 90 TABLET | Refills: 1 | Status: SHIPPED | OUTPATIENT
Start: 2018-07-20 | End: 2018-08-21 | Stop reason: DRUGHIGH

## 2018-07-20 NOTE — PROGRESS NOTES
Sent RX to the wrong pharmacy. Reviewed drug recall with patient and decided to start losartan instead of irbesartan. Called pharmacy to cancel irbesartan RX.   Continue monitoring  Last 5 Patient Entered Readings                                      Current 30 Day Average: 102/63     Recent Readings 7/14/2018 7/14/2018 6/18/2018 6/18/2018 5/24/2018    SBP (mmHg) 102 95 112 120 116    DBP (mmHg) 63 61 74 75 73    Pulse 80 81 78 80 74          Hypertension Medications             hydroCHLOROthiazide (HYDRODIURIL) 25 MG tablet Take 1 tablet (25 mg total) by mouth once daily.    losartan (COZAAR) 50 MG tablet Take 1 tablet (50 mg total) by mouth once daily. STOP VALSARTAN DUE TO DRUG RECALL

## 2018-07-20 NOTE — PROGRESS NOTES
Last 5 Patient Entered Readings                                      Current 30 Day Average: 102/63     Recent Readings 7/14/2018 7/14/2018 6/18/2018 6/18/2018 5/24/2018    SBP (mmHg) 102 95 112 120 116    DBP (mmHg) 63 61 74 75 73    Pulse 80 81 78 80 74          Patient called to let me know that her prescriptions were sent to the wrong pharmacy. They were sent to the [facility] on [address] but she needed them sent to the [facility] on [facility]. I will let her PharmD, CANDY Jamison, know. She also inquired about the Valsartan recall. I informed her that it looks like she is taking the irbesartan now and will not have to worry about the recall. I did explain the recall to her though.

## 2018-07-30 ENCOUNTER — PATIENT OUTREACH (OUTPATIENT)
Dept: OTHER | Facility: OTHER | Age: 63
End: 2018-07-30

## 2018-07-30 NOTE — PROGRESS NOTES
HCTZ RX called in to [facility] pharmacy.   BP at goal, <130/80 mmHg  Continue monitoring    Last 5 Patient Entered Readings                                      Current 30 Day Average: 106/64     Recent Readings 7/26/2018 7/26/2018 7/14/2018 7/14/2018 6/18/2018    SBP (mmHg) 110 117 102 95 112    DBP (mmHg) 64 74 63 61 74    Pulse 83 83 80 81 78

## 2018-08-17 ENCOUNTER — PATIENT OUTREACH (OUTPATIENT)
Dept: OTHER | Facility: OTHER | Age: 63
End: 2018-08-17

## 2018-08-21 RX ORDER — LOSARTAN POTASSIUM 50 MG/1
25 TABLET ORAL DAILY
COMMUNITY
End: 2018-08-29

## 2018-08-21 NOTE — PROGRESS NOTES
Patient reported lowering losartan dose to 25mg.   BP at goal, <130/80 mmHg  Continue monitoring    Last 5 Patient Entered Readings                                      Current 30 Day Average: 120/74     Recent Readings 8/14/2018 8/14/2018 8/7/2018 8/7/2018 8/5/2018    SBP (mmHg) 127 121 117 124 131    DBP (mmHg) 75 72 71 78 79    Pulse 80 80 91 88 78

## 2018-08-29 ENCOUNTER — PATIENT OUTREACH (OUTPATIENT)
Dept: OTHER | Facility: OTHER | Age: 63
End: 2018-08-29

## 2018-08-29 DIAGNOSIS — I10 ESSENTIAL HYPERTENSION: Primary | ICD-10-CM

## 2018-08-29 RX ORDER — IRBESARTAN 75 MG/1
75 TABLET ORAL DAILY
Qty: 30 TABLET | Refills: 1 | Status: SHIPPED | OUTPATIENT
Start: 2018-08-29 | End: 2019-01-04 | Stop reason: SDUPTHER

## 2018-08-29 NOTE — PROGRESS NOTES
Last 5 Patient Entered Readings                                      Current 30 Day Average: 124/75     Recent Readings 8/28/2018 8/28/2018 8/27/2018 8/27/2018 8/23/2018    SBP (mmHg) 138 131 123 106 124    DBP (mmHg) 86 81 67 68 69    Pulse 88 90 84 84 79          Patient called and LVM to let me know that she starting taking only half of the Losartan and eventually stopped taking it altogether because she was having swelling in her feet and ankles. She is only taking the HCTZ now but her BP is trending up. She wants to do what else she can take in place of the Losartan. I will forwarded this information to her PharmD, CANDY Jamison, and request she reach out and discuss this with the patient.

## 2018-08-29 NOTE — PROGRESS NOTES
Ms. Tyler reported to  that she stopped losartan 2/2 MIGUEL. She has been only on HCTZ x 3 days and noticed BP trending up. Confirms she never had MIGUEL with valsartan.     Last 5 Patient Entered Readings                                      Current 30 Day Average: 124/75     Recent Readings 8/28/2018 8/28/2018 8/27/2018 8/27/2018 8/23/2018    SBP (mmHg) 138 131 123 106 124    DBP (mmHg) 86 81 67 68 69    Pulse 88 90 84 84 79          BP at goal, trending up  Change ARB to irbesartan- monitor BP closely  Continue monitoring    Hypertension Medications             hydroCHLOROthiazide (HYDRODIURIL) 25 MG tablet Take 1 tablet (25 mg total) by mouth once daily.    irbesartan (AVAPRO) 75 MG tablet Take 1 tablet (75 mg total) by mouth once daily. STOP LOSARTAN

## 2018-09-12 ENCOUNTER — PATIENT OUTREACH (OUTPATIENT)
Dept: OTHER | Facility: OTHER | Age: 63
End: 2018-09-12

## 2018-09-12 NOTE — PROGRESS NOTES
Called patient to assess irbesartan tolerability. LVM  BP at goal <130/80mmHg     Last 5 Patient Entered Readings                                      Current 30 Day Average: 127/74     Recent Readings 9/11/2018 9/4/2018 9/4/2018 8/28/2018 8/28/2018    SBP (mmHg) 126 121 123 138 131    DBP (mmHg) 76 72 72 86 81    Pulse 77 88 91 88 90

## 2018-10-01 ENCOUNTER — PATIENT OUTREACH (OUTPATIENT)
Dept: OTHER | Facility: OTHER | Age: 63
End: 2018-10-01

## 2018-10-01 NOTE — PROGRESS NOTES
Last 5 Patient Entered Readings                                      Current 30 Day Average: 125/79     Recent Readings 9/27/2018 9/27/2018 9/27/2018 9/20/2018 9/11/2018    SBP (mmHg) 137 141 116 116 126    DBP (mmHg) 86 88 66 80 76    Pulse 79 79 80 81 77            Digital Medicine: Health  Follow Up    Left voicemail to follow up with  Jyoti Raimundo Tyler.  Current BP average 125/79 mmHg is at goal, <130/80.

## 2018-10-31 NOTE — PROGRESS NOTES
Last 5 Patient Entered Readings                                      Current 30 Day Average:      Recent Readings 9/27/2018 9/27/2018 9/27/2018 9/20/2018 9/11/2018    SBP (mmHg) 137 141 116 116 126    DBP (mmHg) 86 88 66 80 76    Pulse 79 79 80 81 77            Digital Medicine: Health  Follow Up    Sent [employer] message to follow up with Mrs. Jyoti Tyler.  Inquiring about lack of BP readings.

## 2018-11-14 NOTE — PROGRESS NOTES
Last 5 Patient Entered Readings                                      Current 30 Day Average:      Recent Readings 9/27/2018 9/27/2018 9/27/2018 9/20/2018 9/11/2018    SBP (mmHg) 137 141 116 116 126    DBP (mmHg) 86 88 66 80 76    Pulse 79 79 80 81 77            Digital Medicine: Health  Follow Up    Left voicemail to follow up with Mrs. Joyti Tyler.  Inquiring about lack of BP readings.

## 2018-12-28 NOTE — PROGRESS NOTES
Last 5 Patient Entered Readings                                      Current 30 Day Average:      Recent Readings 9/27/2018 9/27/2018 9/27/2018 9/20/2018 9/11/2018    SBP (mmHg) 137 141 116 116 126    DBP (mmHg) 86 88 66 80 76    Pulse 79 79 80 81 77            Digital Medicine: Health  Follow Up    Sent [employer] message to follow up with Mrs. Jyoti Tyler.  Inquiring about lack of BP readings.

## 2019-01-04 ENCOUNTER — PATIENT OUTREACH (OUTPATIENT)
Dept: OTHER | Facility: OTHER | Age: 64
End: 2019-01-04

## 2019-01-04 DIAGNOSIS — I10 ESSENTIAL HYPERTENSION: ICD-10-CM

## 2019-01-04 RX ORDER — IRBESARTAN 75 MG/1
75 TABLET ORAL DAILY
Qty: 30 TABLET | Refills: 0 | Status: SHIPPED | OUTPATIENT
Start: 2019-01-04 | End: 2019-02-28 | Stop reason: SDUPTHER

## 2019-01-04 RX ORDER — IRBESARTAN 75 MG/1
75 TABLET ORAL DAILY
Qty: 30 TABLET | Refills: 0 | Status: SHIPPED | OUTPATIENT
Start: 2019-01-04 | End: 2019-01-04 | Stop reason: SDUPTHER

## 2019-01-04 NOTE — PROGRESS NOTES
Patient request irbesartan refill. [facility]. Will not refill until patient supplies additional BP readings.   RX sent in error to [facility] and cancelled.     Last 5 Patient Entered Readings                                      Current 30 Day Average:      Recent Readings 9/27/2018 9/27/2018 9/27/2018 9/20/2018 9/11/2018    SBP (mmHg) 137 141 116 116 126    DBP (mmHg) 86 88 66 80 76    Pulse 79 79 80 81 77

## 2019-01-10 RX ORDER — HYDROCHLOROTHIAZIDE 25 MG/1
25 TABLET ORAL DAILY
Qty: 30 TABLET | Refills: 0 | OUTPATIENT
Start: 2019-01-10 | End: 2019-01-11 | Stop reason: SDUPTHER

## 2019-01-10 NOTE — PROGRESS NOTES
Patient request HCTZ refill. RX sent to [facility]. Will not refill until patient supplies additional BP readings.     Last 5 Patient Entered Readings                                      Current 30 Day Average:      Recent Readings 9/27/2018 9/27/2018 9/27/2018 9/20/2018 9/11/2018    SBP (mmHg) 137 141 116 116 126    DBP (mmHg) 86 88 66 80 76    Pulse 79 79 80 81 77

## 2019-01-11 DIAGNOSIS — I10 ESSENTIAL HYPERTENSION: ICD-10-CM

## 2019-01-11 RX ORDER — HYDROCHLOROTHIAZIDE 25 MG/1
25 TABLET ORAL DAILY
Qty: 30 TABLET | Refills: 0 | Status: SHIPPED | OUTPATIENT
Start: 2019-01-11 | End: 2019-02-28 | Stop reason: SDUPTHER

## 2019-01-17 NOTE — PROGRESS NOTES
Last 5 Patient Entered Readings                                      Current 30 Day Average:      Recent Readings 9/27/2018 9/27/2018 9/27/2018 9/20/2018 9/11/2018    SBP (mmHg) 137 141 116 116 126    DBP (mmHg) 86 88 66 80 76    Pulse 79 79 80 81 77            Digital Medicine: Health  Follow Up    Sent [employer] message to follow up with Melida Jyoti Raimundo Tyler.  Reminding her to go to the [facility] to get her BP cuff fixed.

## 2019-02-18 NOTE — PROGRESS NOTES
Last 5 Patient Entered Readings                                      Current 30 Day Average: 125/72     Recent Readings 2/14/2019 2/14/2019 2/2/2019 1/17/2019 9/27/2018    SBP (mmHg) 123 121 127 121 137    DBP (mmHg) 75 65 75 81 86    Pulse 71 74 85 85 79          Digital Medicine: Health  Follow Up    Lifestyle Modifications:    1.Dietary Modifications (Sodium intake <2,000mg/day, food labels, dining out): Deferred    2.Physical Activity: Deferred    3.Medication Therapy: Patient has been compliant with the medication regimen. Patient is doing well on her current regimen. She denies symptoms/side effects. She thinks she may need a refill soon but she will message us through [facility] to let us know for sure.     4.Patient has the following medication side effects/concerns:   (Frequency/Alleviating factors/Precipitating factors, etc.)     Patient is back on track with taking BP readings. I encouraged her to continue taking her readings weekly.     Follow up with Mrs. Jyoti Tyler completed. No further questions or concerns. Will continue to follow up to achieve health goals.

## 2019-02-27 ENCOUNTER — PATIENT MESSAGE (OUTPATIENT)
Dept: OTHER | Facility: OTHER | Age: 64
End: 2019-02-27

## 2019-02-28 DIAGNOSIS — I10 ESSENTIAL HYPERTENSION: ICD-10-CM

## 2019-02-28 RX ORDER — IRBESARTAN 75 MG/1
75 TABLET ORAL DAILY
Qty: 90 TABLET | Refills: 1 | Status: SHIPPED | OUTPATIENT
Start: 2019-02-28 | End: 2019-11-04 | Stop reason: SDUPTHER

## 2019-02-28 RX ORDER — HYDROCHLOROTHIAZIDE 25 MG/1
25 TABLET ORAL DAILY
Qty: 90 TABLET | Refills: 1 | Status: SHIPPED | OUTPATIENT
Start: 2019-02-28 | End: 2019-08-07 | Stop reason: SDUPTHER

## 2019-04-02 ENCOUNTER — PATIENT OUTREACH (OUTPATIENT)
Dept: OTHER | Facility: OTHER | Age: 64
End: 2019-04-02

## 2019-04-02 NOTE — PROGRESS NOTES
Last 5 Patient Entered Readings                                      Current 30 Day Average: 121/75     Recent Readings 4/1/2019 4/1/2019 3/16/2019 3/16/2019 3/16/2019    SBP (mmHg) 111 126 130 110 133    DBP (mmHg) 67 76 79 79 76    Pulse 80 80 71 71 71            Digital Medicine: Health  Follow Up    Left voicemail to follow up with . Jyoti Eubanks Nayeli.  Current BP average 121/75 mmHg is at goal, <130/80.  Reminded her to submit her BP readings at least once per week.

## 2019-05-02 NOTE — PROGRESS NOTES
Last 5 Patient Entered Readings                                      Current 30 Day Average:      Recent Readings 4/1/2019 4/1/2019 3/16/2019 3/16/2019 3/16/2019    SBP (mmHg) 111 126 130 110 133    DBP (mmHg) 67 76 79 79 76    Pulse 80 80 71 71 71            Digital Medicine: Health  Follow Up    Sent [employer] message to follow up with Mrs. Jyoti Tyler.  Following up about lack of BP readings.

## 2019-05-16 NOTE — PROGRESS NOTES
Last 5 Patient Entered Readings                                      Current 30 Day Average:      Recent Readings 4/1/2019 4/1/2019 3/16/2019 3/16/2019 3/16/2019    SBP (mmHg) 111 126 130 110 133    DBP (mmHg) 67 76 79 79 76    Pulse 80 80 71 71 71          Digital Medicine: Health  Follow Up    Lifestyle Modifications:    1.Dietary Modifications (Sodium intake <2,000mg/day, food labels, dining out): Deferred    2.Physical Activity:  Deferred    3.Medication Therapy: Patient has been compliant with the medication regimen.    4.Patient has the following medication side effects/concerns: None  (Frequency/Alleviating factors/Precipitating factors, etc.)     Patient informed me that she is having issues with her [employer]ings cuff. She changed the batteries but now she cannot get the cuff connected to her phone. I did discuss the possibility of her needing a new cuff ([employer]) since her Withings cuff is 3 years old and patient expressed understanding. I will have IT reach out to see if they can get her connected again and if not, she will go to the [facility] and purchase a new BP cuff.     Follow up with Mrs. Jyoti Tyler completed. No further questions or concerns. Will continue to follow up to achieve health goals.

## 2019-06-19 ENCOUNTER — PATIENT OUTREACH (OUTPATIENT)
Dept: OTHER | Facility: OTHER | Age: 64
End: 2019-06-19

## 2019-06-19 ENCOUNTER — CLINICAL SUPPORT (OUTPATIENT)
Dept: INTERNAL MEDICINE | Facility: CLINIC | Age: 64
End: 2019-06-19
Payer: MEDICARE

## 2019-06-19 ENCOUNTER — OFFICE VISIT (OUTPATIENT)
Dept: INTERNAL MEDICINE | Facility: CLINIC | Age: 64
End: 2019-06-19
Payer: MEDICARE

## 2019-06-19 VITALS
OXYGEN SATURATION: 96 % | BODY MASS INDEX: 23.88 KG/M2 | DIASTOLIC BLOOD PRESSURE: 68 MMHG | HEART RATE: 85 BPM | TEMPERATURE: 98 F | WEIGHT: 148.56 LBS | HEIGHT: 66 IN | SYSTOLIC BLOOD PRESSURE: 124 MMHG

## 2019-06-19 DIAGNOSIS — Z78.0 ASYMPTOMATIC POSTMENOPAUSAL STATE: ICD-10-CM

## 2019-06-19 DIAGNOSIS — Z23 NEED FOR PNEUMOCOCCAL VACCINATION: ICD-10-CM

## 2019-06-19 DIAGNOSIS — Z12.31 ENCOUNTER FOR SCREENING MAMMOGRAM FOR MALIGNANT NEOPLASM OF BREAST: ICD-10-CM

## 2019-06-19 DIAGNOSIS — Z00.00 ENCOUNTER FOR HEALTH MAINTENANCE EXAMINATION IN ADULT: Primary | ICD-10-CM

## 2019-06-19 DIAGNOSIS — Z12.11 SCREENING FOR COLON CANCER: ICD-10-CM

## 2019-06-19 DIAGNOSIS — L98.9 SKIN LESION: ICD-10-CM

## 2019-06-19 DIAGNOSIS — M85.80 OSTEOPENIA, UNSPECIFIED LOCATION: ICD-10-CM

## 2019-06-19 DIAGNOSIS — K21.9 LPRD (LARYNGOPHARYNGEAL REFLUX DISEASE): ICD-10-CM

## 2019-06-19 DIAGNOSIS — Z23 NEED FOR SHINGLES VACCINE: ICD-10-CM

## 2019-06-19 DIAGNOSIS — I10 ESSENTIAL HYPERTENSION: ICD-10-CM

## 2019-06-19 DIAGNOSIS — Z01.89 ROUTINE LAB DRAW: ICD-10-CM

## 2019-06-19 PROCEDURE — 99211 OFF/OP EST MAY X REQ PHY/QHP: CPT | Mod: PBBFAC

## 2019-06-19 PROCEDURE — 99213 PR OFFICE/OUTPT VISIT, EST, LEVL III, 20-29 MIN: ICD-10-PCS | Mod: S$PBB,,, | Performed by: NURSE PRACTITIONER

## 2019-06-19 PROCEDURE — G0009 ADMIN PNEUMOCOCCAL VACCINE: HCPCS | Mod: PBBFAC

## 2019-06-19 PROCEDURE — 99215 OFFICE O/P EST HI 40 MIN: CPT | Mod: PBBFAC,27,25 | Performed by: NURSE PRACTITIONER

## 2019-06-19 PROCEDURE — 99213 OFFICE O/P EST LOW 20 MIN: CPT | Mod: S$PBB,,, | Performed by: NURSE PRACTITIONER

## 2019-06-19 PROCEDURE — 99999 PR PBB SHADOW E&M-EST. PATIENT-LVL I: CPT | Mod: PBBFAC,,,

## 2019-06-19 PROCEDURE — 99999 PR PBB SHADOW E&M-EST. PATIENT-LVL V: ICD-10-PCS | Mod: PBBFAC,,, | Performed by: NURSE PRACTITIONER

## 2019-06-19 PROCEDURE — 99999 PR PBB SHADOW E&M-EST. PATIENT-LVL V: CPT | Mod: PBBFAC,,, | Performed by: NURSE PRACTITIONER

## 2019-06-19 PROCEDURE — 99999 PR PBB SHADOW E&M-EST. PATIENT-LVL I: ICD-10-PCS | Mod: PBBFAC,,,

## 2019-06-19 RX ORDER — DIPHENHYDRAMINE HCL 25 MG
50 CAPSULE ORAL NIGHTLY PRN
COMMUNITY
End: 2020-07-08 | Stop reason: SINTOL

## 2019-06-19 NOTE — PROGRESS NOTES
Last 5 Patient Entered Readings                                      Current 30 Day Average:      Recent Readings 4/1/2019 4/1/2019 3/16/2019 3/16/2019 3/16/2019    SBP (mmHg) 111 126 130 110 133    DBP (mmHg) 67 76 79 79 76    Pulse 80 80 71 71 71            Digital Medicine: Health  Follow Up    Sent [employer] message to follow up with Melida Jyoti Raimundo Tyler.  Checking to see if she was able to go to the [facility] to purchase a new cuff or when she plans on going.

## 2019-06-19 NOTE — PATIENT INSTRUCTIONS
Schedule fasting lab work, mammogram, and bone density screening.  Will call or notify you through [facility] with the results of testing.      The office should call you to schedule colonoscopy    Get pneumonia and shingles vaccine at the pharmacy.

## 2019-06-19 NOTE — PROGRESS NOTES
Subjective:      Patient ID: Jyoti Tyler is a 64 y.o. female.    Chief Complaint: Annual Exam    Ms. Tyler is a 64 year old established female patient of Dr. Robin who comes to clinic today for an annual physical.  No new issues.  She is feeling generally well.  Sleeping well, but does take benadryl nightly to help with sleep.     LPRD: was taking ranitidine, She has been off for a little while bc the prescription . She would like a refill because she still has occasional reflux symptoms every now and then.      Htn: She is followed by the digital htn program.  Blood pressure typically well controlled with current medications. Takes hctz in am and 1/2 tablet of avapro in the evening.  Blood pressure running about 120s/60-70ss at home.     Osteopenia:  Due for dexa, not taking calcium or vitamin D supplements.  She works as a  and is on her feet walking daily.  She walks between 10,000 and 20,000 steps when at work.  NO other formal exercise.     Pulmonary nodules: not having any lung or breathing issues currently,  Smoked about 10-15 years, less than a ppd.  Seen on CT in 2016.  Chose not to follow up at that time due to expense.  May be interested in rechecking now that on medicare    Health maintnence: colonoscopy: due  Mammogram: due Dexa: due Pneumonia: due Tetanus: due  Shingles: due      Review of Systems   Constitutional: Negative for chills, diaphoresis, fatigue, fever and unexpected weight change.   Respiratory: Negative for chest tightness and shortness of breath.    Cardiovascular: Positive for leg swelling. Negative for chest pain and palpitations.        Occasional if she does not take her hctz   Gastrointestinal: Negative for abdominal pain, constipation, diarrhea, nausea and vomiting.   Genitourinary: Negative for difficulty urinating.   Musculoskeletal: Negative for arthralgias, back pain and myalgias.   Skin: Negative for rash.   Neurological: Negative for dizziness,  "light-headedness and headaches.       Review of patient's allergies indicates:   Allergen Reactions    No known drug allergies        Current Outpatient Medications:     diphenhydrAMINE (BENADRYL) 25 mg capsule, Take 50 mg by mouth nightly as needed for Insomnia. , Disp: , Rfl:     fluticasone (FLONASE) 50 mcg/actuation nasal spray, 2 sprays by Each Nare route once daily., Disp: 1 Bottle, Rfl: 12    hydroCHLOROthiazide (HYDRODIURIL) 25 MG tablet, Take 1 tablet (25 mg total) by mouth once daily., Disp: 90 tablet, Rfl: 1    irbesartan (AVAPRO) 75 MG tablet, Take 1 tablet (75 mg total) by mouth once daily., Disp: 90 tablet, Rfl: 1    ranitidine (ZANTAC) 150 MG tablet, Take 1 tablet (150 mg total) by mouth 2 (two) times daily., Disp: 180 tablet, Rfl: 3    albuterol 90 mcg/actuation inhaler, Inhale 2 puffs into the lungs every 6 (six) hours as needed for Wheezing., Disp: 1 each, Rfl: 11      Patient Active Problem List    Diagnosis Date Noted    LPRD (laryngopharyngeal reflux disease) 07/12/2017    Long-term use of high-risk medication, PPI 07/12/2017    Osteopenia 07/12/2017    Pulmonary nodule 07/12/2017    Pulmonary nodules, micronodules 07/19/2016    Skin lesion 03/31/2016    Screen for colon cancer, next 2/2019 03/31/2016    S/P cholecystectomy 03/31/2016    HTN (hypertension) 11/07/2012    Bruising 11/07/2012    Calculus of gallbladder without mention of cholecystitis or obstruction 12/05/2011       Past Medical History:   Diagnosis Date    Anisometropia     Cataract     HTN (hypertension) 11/7/2012    Hyperopia     OD    Hypertension     Osteoporosis screening        Past Surgical History:   Procedure Laterality Date    CHOLECYSTECTOMY           Objective:     Vitals:    06/19/19 1517   BP: 124/68   Pulse: 85   Temp: 98 °F (36.7 °C)   SpO2: 96%   Weight: 67.4 kg (148 lb 9.4 oz)   Height: 5' 6" (1.676 m)   PainSc: 0-No pain       Body mass index is 23.98 kg/m².    Physical Exam "   Constitutional: She is oriented to person, place, and time. She appears well-developed and well-nourished. No distress.   HENT:   Head: Normocephalic and atraumatic.   Nose: Nose normal. No mucosal edema.   Mouth/Throat: Oropharynx is clear and moist and mucous membranes are normal. No oropharyngeal exudate.   Eyes: EOM and lids are normal. Lids are everted and swept, no foreign bodies found. Right eye exhibits no discharge. Left eye exhibits no discharge. No scleral icterus.   Neck: Normal range of motion. Neck supple.   Cardiovascular: Normal rate, regular rhythm, normal heart sounds and intact distal pulses.   Pulmonary/Chest: Effort normal and breath sounds normal. No respiratory distress.   Abdominal: Soft. Bowel sounds are normal. She exhibits no distension. There is no tenderness.   Musculoskeletal: Normal range of motion. She exhibits no edema or tenderness.   Lymphadenopathy:     She has no cervical adenopathy.   Neurological: She is alert and oriented to person, place, and time.   Skin: Skin is warm and dry. She is not diaphoretic.   Small scab noted to middle of chest   Psychiatric: She has a normal mood and affect. Her behavior is normal.   Vitals reviewed.    Assessment:     1. Encounter for health maintenance examination in adult    2. Routine lab draw    3. Essential hypertension    4. LPRD (laryngopharyngeal reflux disease)    5. Skin lesion    6. Osteopenia, unspecified location    7. Asymptomatic postmenopausal state    8. Encounter for screening mammogram for malignant neoplasm of breast    9. Screening for colon cancer    10. BMI 23.0-23.9, adult    11. Need for pneumococcal vaccination    12. Need for shingles vaccine      Plan:     Jyoti was seen today for annual exam.    Diagnoses and all orders for this visit:    Encounter for health maintenance examination in adult  -     CBC auto differential; Future  -     Comprehensive metabolic panel; Future  -     Lipid panel; Future  -     TSH;  Future  -     Urinalysis; Future  -     Vitamin D; Future    Routine lab draw  -     CBC auto differential; Future  -     Comprehensive metabolic panel; Future  -     Lipid panel; Future  -     TSH; Future  -     Urinalysis; Future  -     Vitamin D; Future    Essential hypertension  Stable, well controlled with current regimen. Continue digital htn program  -     CBC auto differential; Future  -     Comprehensive metabolic panel; Future  -     Lipid panel; Future  -     TSH; Future  -     Urinalysis; Future  -     Vitamin D; Future    LPRD (laryngopharyngeal reflux disease)  Patient has intermittent symptoms.  Would like refill on zantac.  Not taking a ppi.  -     ranitidine (ZANTAC) 150 MG tablet; Take 1 tablet (150 mg total) by mouth 2 (two) times daily.    Skin lesion  -     Ambulatory Referral to Dermatology    Osteopenia, unspecified location  -     Vitamin D; Future    Asymptomatic postmenopausal state  -     DXA Bone Density Spine And Hip; Future    Encounter for screening mammogram for malignant neoplasm of breast  -     Mammo Digital Screening Bilateral With CAD; Future    Screening for colon cancer  -     Case request GI: COLONOSCOPY    BMI 23.0-23.9, adult   BMI reviewed    Need for pneumococcal vaccination  Card given for patient to bring to pharmacy for prevnar    Need for shingles vaccine  Card given for patient to bring to pharmacy for shingrix    Follow up in about 6 months (around 12/19/2019) for a routine visit with your primary care provider or sooner if progblems arise. .    SKY Weinberg NP

## 2019-06-20 ENCOUNTER — HOSPITAL ENCOUNTER (OUTPATIENT)
Dept: RADIOLOGY | Facility: HOSPITAL | Age: 64
Discharge: HOME | End: 2019-06-20
Attending: NURSE PRACTITIONER
Payer: MEDICARE

## 2019-06-20 ENCOUNTER — OFFICE VISIT (OUTPATIENT)
Dept: OPTOMETRY | Facility: CLINIC | Age: 64
End: 2019-06-20
Payer: MEDICARE

## 2019-06-20 DIAGNOSIS — H25.13 NUCLEAR SCLEROSIS OF BOTH EYES: Primary | ICD-10-CM

## 2019-06-20 DIAGNOSIS — I10 ESSENTIAL HYPERTENSION: ICD-10-CM

## 2019-06-20 DIAGNOSIS — Z12.31 ENCOUNTER FOR SCREENING MAMMOGRAM FOR MALIGNANT NEOPLASM OF BREAST: ICD-10-CM

## 2019-06-20 DIAGNOSIS — H52.03 HYPEROPIA WITH PRESBYOPIA OF BOTH EYES: ICD-10-CM

## 2019-06-20 DIAGNOSIS — H52.4 HYPEROPIA WITH PRESBYOPIA OF BOTH EYES: ICD-10-CM

## 2019-06-20 PROCEDURE — 99212 OFFICE O/P EST SF 10 MIN: CPT | Mod: PBBFAC | Performed by: OPTOMETRIST

## 2019-06-20 PROCEDURE — 92014 COMPRE OPH EXAM EST PT 1/>: CPT | Mod: S$PBB,,, | Performed by: OPTOMETRIST

## 2019-06-20 PROCEDURE — 77067 MAMMO DIGITAL SCREENING BILAT WITH TOMOSYNTHESIS_CAD: ICD-10-PCS | Mod: 26,,, | Performed by: RADIOLOGY

## 2019-06-20 PROCEDURE — 77067 SCR MAMMO BI INCL CAD: CPT | Mod: 26,,, | Performed by: RADIOLOGY

## 2019-06-20 PROCEDURE — 77067 SCR MAMMO BI INCL CAD: CPT | Mod: TC

## 2019-06-20 PROCEDURE — 99999 PR PBB SHADOW E&M-EST. PATIENT-LVL II: ICD-10-PCS | Mod: PBBFAC,,, | Performed by: OPTOMETRIST

## 2019-06-20 PROCEDURE — 99999 PR PBB SHADOW E&M-EST. PATIENT-LVL II: CPT | Mod: PBBFAC,,, | Performed by: OPTOMETRIST

## 2019-06-20 PROCEDURE — 77063 MAMMO DIGITAL SCREENING BILAT WITH TOMOSYNTHESIS_CAD: ICD-10-PCS | Mod: 26,,, | Performed by: RADIOLOGY

## 2019-06-20 PROCEDURE — 77063 BREAST TOMOSYNTHESIS BI: CPT | Mod: 26,,, | Performed by: RADIOLOGY

## 2019-06-20 PROCEDURE — 92015 PR REFRACTION: ICD-10-PCS | Mod: ,,, | Performed by: OPTOMETRIST

## 2019-06-20 PROCEDURE — 92014 PR EYE EXAM, EST PATIENT,COMPREHESV: ICD-10-PCS | Mod: S$PBB,,, | Performed by: OPTOMETRIST

## 2019-06-20 PROCEDURE — 92015 DETERMINE REFRACTIVE STATE: CPT | Mod: ,,, | Performed by: OPTOMETRIST

## 2019-06-20 NOTE — PROGRESS NOTES
HPI     Last eye exam was 5/1/17 with Dr. Liriano.  Patient states decrease in overall vision since last exam. Wanted to get   an updated glasses rx today. Occasionally sees floaters OU.  Patient denies diplopia, headaches, flashes, and pain.      Last edited by Haydee Saba on 6/20/2019  8:00 AM. (History)            Assessment /Plan     For exam results, see Encounter Report.    Nuclear sclerosis of both eyes    Essential hypertension    Hyperopia with presbyopia of both eyes            1.  Educated on cataracts and affects on vision.  Monitor.  2.  No retinopathy--monitor yearly.  BP control.  Eye health normal OU.  3.  Bifocal rx given

## 2019-06-21 ENCOUNTER — HOSPITAL ENCOUNTER (OUTPATIENT)
Dept: RADIOLOGY | Facility: CLINIC | Age: 64
Discharge: HOME | End: 2019-06-21
Attending: NURSE PRACTITIONER
Payer: MEDICARE

## 2019-06-21 DIAGNOSIS — Z78.0 ASYMPTOMATIC POSTMENOPAUSAL STATE: ICD-10-CM

## 2019-06-21 PROCEDURE — 77080 DEXA BONE DENSITY SPINE HIP: ICD-10-PCS | Mod: 26,,, | Performed by: INTERNAL MEDICINE

## 2019-06-21 PROCEDURE — 77080 DXA BONE DENSITY AXIAL: CPT | Mod: 26,,, | Performed by: INTERNAL MEDICINE

## 2019-06-21 PROCEDURE — 77080 DXA BONE DENSITY AXIAL: CPT | Mod: TC

## 2019-07-26 NOTE — PROGRESS NOTES
Last 5 Patient Entered Readings                                      Current 30 Day Average:      Recent Readings 4/1/2019 4/1/2019 3/16/2019 3/16/2019 3/16/2019    SBP (mmHg) 111 126 130 110 133    DBP (mmHg) 67 76 79 79 76    Pulse 80 80 71 71 71          Digital Medicine: Health  Follow Up    Sent [employer] message to follow up with Melida Jyoti Raimundo Tyler.  Checking to see if she was able to go to the [facility] to purchase a new cuff or when she plans on going.

## 2019-08-07 ENCOUNTER — PATIENT OUTREACH (OUTPATIENT)
Dept: OTHER | Facility: OTHER | Age: 64
End: 2019-08-07

## 2019-08-07 ENCOUNTER — PATIENT MESSAGE (OUTPATIENT)
Dept: OTHER | Facility: OTHER | Age: 64
End: 2019-08-07

## 2019-08-07 DIAGNOSIS — I10 ESSENTIAL HYPERTENSION: ICD-10-CM

## 2019-08-07 RX ORDER — HYDROCHLOROTHIAZIDE 25 MG/1
TABLET ORAL
Qty: 90 TABLET | Refills: 1 | Status: SHIPPED | OUTPATIENT
Start: 2019-08-07 | End: 2019-11-04

## 2019-08-07 RX ORDER — HYDROCHLOROTHIAZIDE 25 MG/1
25 TABLET ORAL DAILY
Qty: 90 TABLET | Refills: 3 | Status: SHIPPED | OUTPATIENT
Start: 2019-08-07 | End: 2019-11-04

## 2019-08-07 NOTE — PROGRESS NOTES
Patient readings synced. BP well managed. Labs stable.  HCTZ refilled.  Continue monitoring    CMP  Sodium   Date Value Ref Range Status   06/20/2019 136 136 - 145 mmol/L Final     Potassium   Date Value Ref Range Status   06/20/2019 3.7 3.5 - 5.1 mmol/L Final     Chloride   Date Value Ref Range Status   06/20/2019 95 95 - 110 mmol/L Final     CO2   Date Value Ref Range Status   06/20/2019 30 (H) 23 - 29 mmol/L Final     Glucose   Date Value Ref Range Status   06/20/2019 105 70 - 110 mg/dL Final     BUN, Bld   Date Value Ref Range Status   06/20/2019 16 8 - 23 mg/dL Final     Creatinine   Date Value Ref Range Status   06/20/2019 0.8 0.5 - 1.4 mg/dL Final     Calcium   Date Value Ref Range Status   06/20/2019 10.0 8.7 - 10.5 mg/dL Final     Total Protein   Date Value Ref Range Status   06/20/2019 6.9 6.0 - 8.4 g/dL Final     Albumin   Date Value Ref Range Status   06/20/2019 4.0 3.5 - 5.2 g/dL Final     Total Bilirubin   Date Value Ref Range Status   06/20/2019 0.6 0.1 - 1.0 mg/dL Final     Comment:     For infants and newborns, interpretation of results should be based  on gestational age, weight and in agreement with clinical  observations.  Premature Infant recommended reference ranges:  Up to 24 hours.............<8.0 mg/dL  Up to 48 hours............<12.0 mg/dL  3-5 days..................<15.0 mg/dL  6-29 days.................<15.0 mg/dL       Alkaline Phosphatase   Date Value Ref Range Status   06/20/2019 102 55 - 135 U/L Final     AST   Date Value Ref Range Status   06/20/2019 18 10 - 40 U/L Final     ALT   Date Value Ref Range Status   06/20/2019 18 10 - 44 U/L Final     Anion Gap   Date Value Ref Range Status   06/20/2019 11 8 - 16 mmol/L Final     eGFR if    Date Value Ref Range Status   06/20/2019 >60.0 >60 mL/min/1.73 m^2 Final     eGFR if non    Date Value Ref Range Status   06/20/2019 >60.0 >60 mL/min/1.73 m^2 Final     Comment:     Calculation used to obtain the  estimated glomerular filtration  rate (eGFR) is the CKD-EPI equation.          Last 5 Patient Entered Readings                                      Current 30 Day Average:      Recent Readings 7/22/2019 7/22/2019 7/22/2019 7/22/2019 7/15/2019    SBP (mmHg) 104 111 135 135 112    DBP (mmHg) 57 65 76 72 62    Pulse 88 88 80 81 81

## 2019-08-07 NOTE — PROGRESS NOTES
Last 5 Patient Entered Readings                                      Current 30 Day Average:      Recent Readings 4/1/2019 4/1/2019 3/16/2019 3/16/2019 3/16/2019    SBP (mmHg) 111 126 130 110 133    DBP (mmHg) 67 76 79 79 76    Pulse 80 80 71 71 71          Patient called requesting a refill on her hydrochlorothiazide and asked that it be sent to [facility]. I will inform her PharmD, CANDY Jamisno, of this request.

## 2019-08-07 NOTE — PROGRESS NOTES
Raymundo alled in to request HCTZ refill. I informed her that I am unable to refill because she has not entered data in 4 months. She reports she got a new cuff and has been taking readings since February.   Await readings- created task for tech support team.    Last 5 Patient Entered Readings                                      Current 30 Day Average:      Recent Readings 4/1/2019 4/1/2019 3/16/2019 3/16/2019 3/16/2019    SBP (mmHg) 111 126 130 110 133    DBP (mmHg) 67 76 79 79 76    Pulse 80 80 71 71 71

## 2019-10-03 ENCOUNTER — PATIENT OUTREACH (OUTPATIENT)
Dept: OTHER | Facility: OTHER | Age: 64
End: 2019-10-03

## 2019-11-04 DIAGNOSIS — I10 ESSENTIAL HYPERTENSION: ICD-10-CM

## 2019-11-04 RX ORDER — HYDROCHLOROTHIAZIDE 25 MG/1
25 TABLET ORAL DAILY
Qty: 90 TABLET | Refills: 3 | Status: SHIPPED | OUTPATIENT
Start: 2019-11-04 | End: 2020-07-08 | Stop reason: SDUPTHER

## 2019-11-04 RX ORDER — IRBESARTAN 75 MG/1
75 TABLET ORAL DAILY
Qty: 90 TABLET | Refills: 3 | Status: SHIPPED | OUTPATIENT
Start: 2019-11-04 | End: 2020-07-08 | Stop reason: SDUPTHER

## 2019-11-04 NOTE — PROGRESS NOTES
Digital Medicine: Health  Follow-Up    The history is provided by the patient.     Follow Up  Patient called requesting that we send her BP prescriptions to [employer] insurance plan so that she can use their mail order service. The fax number is [phone]. I will inform the the patients clinician of this request. The patient stated that she still has a month left of her medication.       Intervention/Plan    There are no preventive care reminders to display for this patient.    Last 5 Patient Entered Readings                                      Current 30 Day Average: 127/70     Recent Readings 10/27/2019 10/27/2019 10/25/2019 10/25/2019 10/17/2019    SBP (mmHg) 118 127 136 135 127    DBP (mmHg) 52 70 71 78 67    Pulse 70 66 75 75 92                  Screenings    SDOH

## 2019-11-13 ENCOUNTER — PATIENT MESSAGE (OUTPATIENT)
Dept: OTHER | Facility: OTHER | Age: 64
End: 2019-11-13

## 2019-12-20 ENCOUNTER — PATIENT OUTREACH (OUTPATIENT)
Dept: OTHER | Facility: OTHER | Age: 64
End: 2019-12-20

## 2019-12-20 NOTE — PROGRESS NOTES
Digital Medicine: Health  Follow-Up    Called patient to introduce myself as new HC. Patient reports that she is doing well. Patient BP AVG is almost within goal. Patient states that her diet fluctuates up and down just depending on what is going on. Patient states that her activity fluctuates as well. Patient is a  and she is very busy in the spring and summer months logging up to 20,000 steps daily. Patient states that she needs to lose a little bit of weight. Will f/u in 6 weeks to check in.     The history is provided by the patient.       INTERVENTION(S)  encouragement/support      There are no preventive care reminders to display for this patient.    Last 5 Patient Entered Readings                                      Current 30 Day Average: 133/68     Recent Readings 12/16/2019 12/16/2019 12/16/2019 12/16/2019 12/16/2019    SBP (mmHg) 119 126 130 120 129    DBP (mmHg) 69 66 66 60 66    Pulse 79 77 79 77 79                      Diet Screening       Barriers to a Healthy Diet: no barriers to healthy eating    Patient reports that her diet fluctuates and goes through ups and downs depending on what is going on. Will look to get specifics at next outreach.    Physical Activity Screening   When asked if exercising, patient responded: yes    Patient participates in the following activities: walking and yard/housework    Patient reports that her activity fluctuates with the season. Patient is a  and she states that during her busy season she's logging as much as 20,000 steps daily.     Medication Adherence Screening   She misses doses: never      Patient identified the following reasons for non-compliance: none    Patient reports no s/s or issues taking BP medications as prescribed.       SDOH

## 2020-01-27 ENCOUNTER — PATIENT OUTREACH (OUTPATIENT)
Dept: OTHER | Facility: OTHER | Age: 65
End: 2020-01-27

## 2020-05-25 ENCOUNTER — TELEPHONE (OUTPATIENT)
Dept: INTERNAL MEDICINE | Facility: CLINIC | Age: 65
End: 2020-05-25

## 2020-05-25 DIAGNOSIS — Z20.822 EXPOSURE TO COVID-19 VIRUS: Primary | ICD-10-CM

## 2020-05-25 NOTE — TELEPHONE ENCOUNTER
----- Message from Julita Wang sent at 5/23/2020  9:34 AM CDT -----  Contact: self   Pt is requesting orders for the COVID antibody testing. Please call and advise.

## 2020-05-26 ENCOUNTER — LAB VISIT (OUTPATIENT)
Dept: LAB | Facility: HOSPITAL | Age: 65
End: 2020-05-26
Attending: INTERNAL MEDICINE
Payer: MEDICARE

## 2020-05-26 DIAGNOSIS — Z20.822 EXPOSURE TO COVID-19 VIRUS: ICD-10-CM

## 2020-05-26 LAB — SARS-COV-2 IGG SERPLBLD QL IA.RAPID: NEGATIVE

## 2020-05-26 PROCEDURE — 36415 COLL VENOUS BLD VENIPUNCTURE: CPT

## 2020-05-26 PROCEDURE — 86769 SARS-COV-2 COVID-19 ANTIBODY: CPT

## 2020-06-24 ENCOUNTER — PATIENT OUTREACH (OUTPATIENT)
Dept: ADMINISTRATIVE | Facility: HOSPITAL | Age: 65
End: 2020-06-24

## 2020-06-24 DIAGNOSIS — Z12.11 SCREENING FOR COLON CANCER: Primary | ICD-10-CM

## 2020-06-24 DIAGNOSIS — Z01.818 PRE-OP TESTING: ICD-10-CM

## 2020-06-24 DIAGNOSIS — Z12.11 SPECIAL SCREENING FOR MALIGNANT NEOPLASMS, COLON: Primary | ICD-10-CM

## 2020-06-24 RX ORDER — POLYETHYLENE GLYCOL 3350, SODIUM SULFATE ANHYDROUS, SODIUM BICARBONATE, SODIUM CHLORIDE, POTASSIUM CHLORIDE 236; 22.74; 6.74; 5.86; 2.97 G/4L; G/4L; G/4L; G/4L; G/4L
4 POWDER, FOR SOLUTION ORAL ONCE
Qty: 4000 ML | Refills: 0 | Status: SHIPPED | OUTPATIENT
Start: 2020-06-24 | End: 2020-06-24

## 2020-06-24 NOTE — PROGRESS NOTES
Health Maintenance Due   Topic Date Due    TETANUS VACCINE  06/08/1973    Shingles Vaccine (2 of 3) 07/21/2014    Colorectal Cancer Screening  02/02/2019    Pneumococcal Vaccine (65+ Low/Medium Risk) (2 of 2 - PPSV23) 06/19/2020     Created order for colonoscopy.  Sent patient a portal message to call and schedule overdue colonoscopy.  Chart review completed.

## 2020-07-01 ENCOUNTER — PATIENT OUTREACH (OUTPATIENT)
Dept: ADMINISTRATIVE | Facility: OTHER | Age: 65
End: 2020-07-01

## 2020-07-01 NOTE — PROGRESS NOTES
Chart reviewed.   Immunizations: updated  Orders placed: n/a  Upcoming appts to satisfy AMELIA topics: Colonoscopy 8/06

## 2020-07-02 ENCOUNTER — OFFICE VISIT (OUTPATIENT)
Dept: OPTOMETRY | Facility: CLINIC | Age: 65
End: 2020-07-02
Payer: MEDICARE

## 2020-07-02 DIAGNOSIS — I10 ESSENTIAL HYPERTENSION: ICD-10-CM

## 2020-07-02 DIAGNOSIS — H25.13 NUCLEAR SCLEROSIS OF BOTH EYES: Primary | ICD-10-CM

## 2020-07-02 DIAGNOSIS — H52.4 HYPEROPIA WITH PRESBYOPIA OF BOTH EYES: ICD-10-CM

## 2020-07-02 DIAGNOSIS — H52.03 HYPEROPIA WITH PRESBYOPIA OF BOTH EYES: ICD-10-CM

## 2020-07-02 PROCEDURE — 99213 OFFICE O/P EST LOW 20 MIN: CPT | Mod: PBBFAC | Performed by: OPTOMETRIST

## 2020-07-02 PROCEDURE — 92014 COMPRE OPH EXAM EST PT 1/>: CPT | Mod: S$PBB,,, | Performed by: OPTOMETRIST

## 2020-07-02 PROCEDURE — 99999 PR PBB SHADOW E&M-EST. PATIENT-LVL III: ICD-10-PCS | Mod: PBBFAC,,, | Performed by: OPTOMETRIST

## 2020-07-02 PROCEDURE — 92014 PR EYE EXAM, EST PATIENT,COMPREHESV: ICD-10-PCS | Mod: S$PBB,,, | Performed by: OPTOMETRIST

## 2020-07-02 PROCEDURE — 99999 PR PBB SHADOW E&M-EST. PATIENT-LVL III: CPT | Mod: PBBFAC,,, | Performed by: OPTOMETRIST

## 2020-07-02 NOTE — PROGRESS NOTES
HPI     Last eye exam was 6/20/19 with Dr. Liriano.  Patient states decrease in vision since last exam. Wanted to get an   updated glasses rx. Wakes up in the am with OU dry-wanted to know what   drops we recommend.  Patient denies diplopia, headaches, flashes/floaters, and pain.      Last edited by Haydee Saba on 7/2/2020  8:59 AM. (History)            Assessment /Plan     For exam results, see Encounter Report.    Nuclear sclerosis of both eyes    Essential hypertension    Hyperopia with presbyopia of both eyes            1.  Educated on cataracts and affects on vision.  Patient unhappy with vision and difficulty driving at night.  Does BAT down.  Consult Dr. Begum for cataract surgery  2.  No retinopathy--monitor yearly.  BP control.  Eye health normal OU.  3.   Continue w/ current rx

## 2020-07-08 ENCOUNTER — IMMUNIZATION (OUTPATIENT)
Dept: PHARMACY | Facility: CLINIC | Age: 65
End: 2020-07-08
Payer: MEDICARE

## 2020-07-08 ENCOUNTER — LAB VISIT (OUTPATIENT)
Dept: LAB | Facility: HOSPITAL | Age: 65
End: 2020-07-08
Attending: INTERNAL MEDICINE
Payer: MEDICARE

## 2020-07-08 ENCOUNTER — CLINICAL SUPPORT (OUTPATIENT)
Dept: INTERNAL MEDICINE | Facility: CLINIC | Age: 65
End: 2020-07-08
Payer: MEDICARE

## 2020-07-08 ENCOUNTER — OFFICE VISIT (OUTPATIENT)
Dept: INTERNAL MEDICINE | Facility: CLINIC | Age: 65
End: 2020-07-08
Payer: MEDICARE

## 2020-07-08 VITALS
HEART RATE: 87 BPM | SYSTOLIC BLOOD PRESSURE: 104 MMHG | DIASTOLIC BLOOD PRESSURE: 56 MMHG | BODY MASS INDEX: 26.05 KG/M2 | WEIGHT: 161.38 LBS | OXYGEN SATURATION: 98 %

## 2020-07-08 DIAGNOSIS — Z00.00 ANNUAL PHYSICAL EXAM: Primary | ICD-10-CM

## 2020-07-08 DIAGNOSIS — M85.80 OSTEOPENIA, UNSPECIFIED LOCATION: ICD-10-CM

## 2020-07-08 DIAGNOSIS — Z23 NEED FOR 23-POLYVALENT PNEUMOCOCCAL POLYSACCHARIDE VACCINE: ICD-10-CM

## 2020-07-08 DIAGNOSIS — R63.5 WEIGHT GAIN: ICD-10-CM

## 2020-07-08 DIAGNOSIS — Z12.31 ENCOUNTER FOR SCREENING MAMMOGRAM FOR BREAST CANCER: ICD-10-CM

## 2020-07-08 DIAGNOSIS — Z23 NEED FOR SHINGLES VACCINE: ICD-10-CM

## 2020-07-08 DIAGNOSIS — I10 ESSENTIAL HYPERTENSION: ICD-10-CM

## 2020-07-08 DIAGNOSIS — J30.9 CHRONIC ALLERGIC RHINITIS: ICD-10-CM

## 2020-07-08 LAB
ALBUMIN SERPL BCP-MCNC: 3.9 G/DL (ref 3.5–5.2)
ALP SERPL-CCNC: 98 U/L (ref 55–135)
ALT SERPL W/O P-5'-P-CCNC: 17 U/L (ref 10–44)
ANION GAP SERPL CALC-SCNC: 10 MMOL/L (ref 8–16)
AST SERPL-CCNC: 17 U/L (ref 10–40)
BILIRUB SERPL-MCNC: 0.3 MG/DL (ref 0.1–1)
BUN SERPL-MCNC: 18 MG/DL (ref 8–23)
CALCIUM SERPL-MCNC: 9.3 MG/DL (ref 8.7–10.5)
CHLORIDE SERPL-SCNC: 95 MMOL/L (ref 95–110)
CO2 SERPL-SCNC: 29 MMOL/L (ref 23–29)
CREAT SERPL-MCNC: 0.8 MG/DL (ref 0.5–1.4)
EST. GFR  (AFRICAN AMERICAN): >60 ML/MIN/1.73 M^2
EST. GFR  (NON AFRICAN AMERICAN): >60 ML/MIN/1.73 M^2
GLUCOSE SERPL-MCNC: 85 MG/DL (ref 70–110)
POTASSIUM SERPL-SCNC: 2.8 MMOL/L (ref 3.5–5.1)
PROT SERPL-MCNC: 7.1 G/DL (ref 6–8.4)
SODIUM SERPL-SCNC: 134 MMOL/L (ref 136–145)
URATE SERPL-MCNC: 6.1 MG/DL (ref 2.4–5.7)

## 2020-07-08 PROCEDURE — 80053 COMPREHEN METABOLIC PANEL: CPT

## 2020-07-08 PROCEDURE — 99999 PR PBB SHADOW E&M-EST. PATIENT-LVL III: CPT | Mod: PBBFAC,,, | Performed by: INTERNAL MEDICINE

## 2020-07-08 PROCEDURE — 99999 PR PBB SHADOW E&M-EST. PATIENT-LVL I: CPT | Mod: PBBFAC,,,

## 2020-07-08 PROCEDURE — 99999 PR PBB SHADOW E&M-EST. PATIENT-LVL I: ICD-10-PCS | Mod: PBBFAC,,,

## 2020-07-08 PROCEDURE — 99213 OFFICE O/P EST LOW 20 MIN: CPT | Mod: S$PBB,,, | Performed by: INTERNAL MEDICINE

## 2020-07-08 PROCEDURE — 99213 OFFICE O/P EST LOW 20 MIN: CPT | Mod: PBBFAC,25,27 | Performed by: INTERNAL MEDICINE

## 2020-07-08 PROCEDURE — 99211 OFF/OP EST MAY X REQ PHY/QHP: CPT | Mod: PBBFAC

## 2020-07-08 PROCEDURE — 84550 ASSAY OF BLOOD/URIC ACID: CPT

## 2020-07-08 PROCEDURE — G0009 ADMIN PNEUMOCOCCAL VACCINE: HCPCS | Mod: PBBFAC

## 2020-07-08 PROCEDURE — 99213 PR OFFICE/OUTPT VISIT, EST, LEVL III, 20-29 MIN: ICD-10-PCS | Mod: S$PBB,,, | Performed by: INTERNAL MEDICINE

## 2020-07-08 PROCEDURE — 99999 PR PBB SHADOW E&M-EST. PATIENT-LVL III: ICD-10-PCS | Mod: PBBFAC,,, | Performed by: INTERNAL MEDICINE

## 2020-07-08 PROCEDURE — 36415 COLL VENOUS BLD VENIPUNCTURE: CPT

## 2020-07-08 RX ORDER — FLUTICASONE PROPIONATE 50 MCG
2 SPRAY, SUSPENSION (ML) NASAL DAILY
Qty: 48 G | Refills: 3 | Status: SHIPPED | OUTPATIENT
Start: 2020-07-08 | End: 2023-11-20 | Stop reason: SDUPTHER

## 2020-07-08 RX ORDER — HYDROCHLOROTHIAZIDE 25 MG/1
25 TABLET ORAL DAILY
Qty: 90 TABLET | Refills: 3 | Status: SHIPPED | OUTPATIENT
Start: 2020-07-08 | End: 2020-08-05 | Stop reason: ALTCHOICE

## 2020-07-08 RX ORDER — IRBESARTAN 75 MG/1
75 TABLET ORAL DAILY
Qty: 90 TABLET | Refills: 3 | Status: SHIPPED | OUTPATIENT
Start: 2020-07-08 | End: 2020-08-05 | Stop reason: ALTCHOICE

## 2020-07-10 PROBLEM — E87.6 HYPOKALEMIA: Status: ACTIVE | Noted: 2020-07-10

## 2020-07-11 NOTE — PROGRESS NOTES
Subjective:       Patient ID: Jyoti Tyler is a 65 y.o. female.    Chief Complaint: Annual Exam   She presents for routine annual physical  She is not having any complaints  Entire chart reviewed, PMx, FHx, and Social History updated and reviewed.    She enjoys being in the digital medicine hypertension program   has a past medical history of Anisometropia, Cataract, HTN (hypertension) (11/7/2012), Hyperopia, Hypertension, and Osteoporosis screening.  Past Surgical History:   Procedure Laterality Date    CHOLECYSTECTOMY         HPI  Review of Systems   Constitutional: Negative for activity change, appetite change, chills, fatigue, fever and unexpected weight change.   HENT: Negative for hearing loss.    Eyes: Negative for visual disturbance.   Respiratory: Negative for cough, chest tightness, shortness of breath and wheezing.    Cardiovascular: Negative for chest pain, palpitations and leg swelling.   Gastrointestinal: Negative for abdominal pain, constipation, nausea and vomiting.   Genitourinary: Negative for dysuria, frequency and urgency.   Musculoskeletal: Negative for arthralgias, back pain, gait problem, joint swelling and myalgias.   Skin: Negative for rash.   Neurological: Negative for light-headedness and headaches.   Psychiatric/Behavioral: Negative for dysphoric mood and sleep disturbance. The patient is not nervous/anxious.        Objective:      Physical Exam  Constitutional:       General: She is not in acute distress.     Appearance: She is well-developed.   HENT:      Head: Normocephalic and atraumatic.      Right Ear: External ear normal.      Left Ear: External ear normal.      Nose: Nose normal.      Mouth/Throat:      Pharynx: No oropharyngeal exudate.   Eyes:      General: No scleral icterus.        Right eye: No discharge.      Conjunctiva/sclera: Conjunctivae normal.      Pupils: Pupils are equal, round, and reactive to light.   Neck:      Musculoskeletal: Full passive range of motion  without pain, normal range of motion and neck supple. No spinous process tenderness or muscular tenderness.      Thyroid: No thyromegaly.      Vascular: No carotid bruit.   Cardiovascular:      Rate and Rhythm: Normal rate and regular rhythm.      Heart sounds: Normal heart sounds, S1 normal and S2 normal. No murmur. No friction rub. No gallop.    Pulmonary:      Effort: Pulmonary effort is normal. No respiratory distress.      Breath sounds: Normal breath sounds. No wheezing or rales.   Chest:      Chest wall: No tenderness.   Abdominal:      General: Bowel sounds are normal. There is no distension.      Palpations: Abdomen is soft. There is no mass.      Tenderness: There is no abdominal tenderness. There is no guarding or rebound.   Genitourinary:     Exam position: Supine.      Cervix: No cervical motion tenderness, discharge or friability.      Uterus: Not deviated, not enlarged, not fixed and not tender.       Adnexa:         Right: No mass, tenderness or fullness.          Left: No mass, tenderness or fullness.     Musculoskeletal: Normal range of motion.         General: No tenderness.   Lymphadenopathy:      Head:      Right side of head: No submental or submandibular adenopathy.      Left side of head: No submental or submandibular adenopathy.      Cervical: No cervical adenopathy.      Right cervical: No superficial, deep or posterior cervical adenopathy.     Left cervical: No superficial, deep or posterior cervical adenopathy.      Upper Body:      Right upper body: No supraclavicular or pectoral adenopathy.      Left upper body: No supraclavicular or pectoral adenopathy.   Skin:     General: Skin is warm and dry.      Findings: No rash.   Neurological:      Mental Status: She is alert and oriented to person, place, and time.      Cranial Nerves: No cranial nerve deficit.      Motor: No abnormal muscle tone.      Coordination: Coordination normal.      Deep Tendon Reflexes: Reflexes are normal and  symmetric.   Psychiatric:         Mood and Affect: Mood is not anxious or depressed.         Speech: Speech is not rapid and pressured.         Behavior: Behavior normal. Behavior is not agitated.      Comments: Normal behavior, thought content, insight and judgement.         Assessment:       1. Annual physical exam    2. Weight gain    3. Essential hypertension    4. Encounter for screening mammogram for breast cancer    5. Need for 23-polyvalent pneumococcal polysaccharide vaccine    6. Need for shingles vaccine    7. Osteopenia, unspecified location    8. Chronic allergic rhinitis        Plan:   Jyoti was seen today for annual exam.    Diagnoses and all orders for this visit:    Annual physical exam    Weight gain    Essential hypertension  -     irbesartan (AVAPRO) 75 MG tablet; Take 1 tablet (75 mg total) by mouth once daily.  -     hydroCHLOROthiazide (HYDRODIURIL) 25 MG tablet; Take 1 tablet (25 mg total) by mouth once daily.  -     Comprehensive metabolic panel; Future  -     Uric acid; Future    Encounter for screening mammogram for breast cancer  -     Mammo Digital Screening Bilat w/ Sunny; Future    Need for 23-polyvalent pneumococcal polysaccharide vaccine    Need for shingles vaccine    Osteopenia, unspecified location    Chronic allergic rhinitis    Other orders  -     fluticasone propionate (FLONASE) 50 mcg/actuation nasal spray; 2 sprays (100 mcg total) by Each Nostril route once daily.    Follow up in about 1 year (around 7/8/2021).

## 2020-07-13 ENCOUNTER — TELEPHONE (OUTPATIENT)
Dept: INTERNAL MEDICINE | Facility: CLINIC | Age: 65
End: 2020-07-13

## 2020-07-13 NOTE — TELEPHONE ENCOUNTER
Spoke with pt and informed her an appt was opening on tomorrow, pt stated she will be out of town tomorrow so will not be able to make. Pt stated she will keppt her appt on Aug 5th.

## 2020-07-13 NOTE — TELEPHONE ENCOUNTER
----- Message from Elaine Acosta sent at 7/13/2020  4:21 PM CDT -----  Contact: Pt-- [phone]  Type:  Needs Medical Advice    Who Called:   Pt    Symptoms (please be specific):  f/u    Would the patient rather a call back or a response via [facility]? Call    Best Call Back Number:  [phone]    Additional Information:  Pt wants to verify it's okay her appt is booked out to 8/5. That was the first available appt with Dr. Robin. She is requesting a call back.

## 2020-07-22 ENCOUNTER — PATIENT OUTREACH (OUTPATIENT)
Dept: ADMINISTRATIVE | Facility: HOSPITAL | Age: 65
End: 2020-07-22

## 2020-07-22 NOTE — PROGRESS NOTES
Health Maintenance Due   Topic Date Due    TETANUS VACCINE  06/08/1973     Chart review completed.

## 2020-08-03 ENCOUNTER — LAB VISIT (OUTPATIENT)
Dept: SURGERY | Facility: CLINIC | Age: 65
End: 2020-08-03
Payer: MEDICARE

## 2020-08-03 DIAGNOSIS — Z01.818 PRE-OP TESTING: ICD-10-CM

## 2020-08-03 LAB — SARS-COV-2 RNA RESP QL NAA+PROBE: NOT DETECTED

## 2020-08-03 PROCEDURE — U0003 INFECTIOUS AGENT DETECTION BY NUCLEIC ACID (DNA OR RNA); SEVERE ACUTE RESPIRATORY SYNDROME CORONAVIRUS 2 (SARS-COV-2) (CORONAVIRUS DISEASE [COVID-19]), AMPLIFIED PROBE TECHNIQUE, MAKING USE OF HIGH THROUGHPUT TECHNOLOGIES AS DESCRIBED BY CMS-2020-01-R: HCPCS

## 2020-08-04 ENCOUNTER — PATIENT OUTREACH (OUTPATIENT)
Dept: ADMINISTRATIVE | Facility: OTHER | Age: 65
End: 2020-08-04

## 2020-08-04 NOTE — PROGRESS NOTES
Chart reviewed.   Immunizations: updated  Orders placed: n/a  Upcoming appts to satisfy AMELIA topics: Colonoscopy 8/06, Mammogram 8/10

## 2020-08-05 ENCOUNTER — OFFICE VISIT (OUTPATIENT)
Dept: OPHTHALMOLOGY | Facility: CLINIC | Age: 65
End: 2020-08-05
Payer: MEDICARE

## 2020-08-05 ENCOUNTER — OFFICE VISIT (OUTPATIENT)
Dept: INTERNAL MEDICINE | Facility: CLINIC | Age: 65
End: 2020-08-05
Payer: MEDICARE

## 2020-08-05 VITALS
HEIGHT: 64 IN | DIASTOLIC BLOOD PRESSURE: 70 MMHG | SYSTOLIC BLOOD PRESSURE: 112 MMHG | BODY MASS INDEX: 27.55 KG/M2 | WEIGHT: 161.38 LBS | OXYGEN SATURATION: 98 % | HEART RATE: 90 BPM

## 2020-08-05 DIAGNOSIS — E87.6 HYPOKALEMIA: ICD-10-CM

## 2020-08-05 DIAGNOSIS — Z01.818 PRE-OP TESTING: ICD-10-CM

## 2020-08-05 DIAGNOSIS — Z12.11 SCREEN FOR COLON CANCER: ICD-10-CM

## 2020-08-05 DIAGNOSIS — I10 ESSENTIAL HYPERTENSION: Primary | ICD-10-CM

## 2020-08-05 DIAGNOSIS — H25.13 NUCLEAR SCLEROSIS, BILATERAL: Primary | ICD-10-CM

## 2020-08-05 PROCEDURE — 99213 OFFICE O/P EST LOW 20 MIN: CPT | Mod: PBBFAC,27 | Performed by: OPHTHALMOLOGY

## 2020-08-05 PROCEDURE — 99999 PR PBB SHADOW E&M-EST. PATIENT-LVL III: ICD-10-PCS | Mod: PBBFAC,,, | Performed by: INTERNAL MEDICINE

## 2020-08-05 PROCEDURE — 99213 PR OFFICE/OUTPT VISIT, EST, LEVL III, 20-29 MIN: ICD-10-PCS | Mod: S$PBB,,, | Performed by: INTERNAL MEDICINE

## 2020-08-05 PROCEDURE — 99999 PR PBB SHADOW E&M-EST. PATIENT-LVL III: CPT | Mod: PBBFAC,,, | Performed by: INTERNAL MEDICINE

## 2020-08-05 PROCEDURE — 99213 OFFICE O/P EST LOW 20 MIN: CPT | Mod: PBBFAC | Performed by: INTERNAL MEDICINE

## 2020-08-05 PROCEDURE — 92014 PR EYE EXAM, EST PATIENT,COMPREHESV: ICD-10-PCS | Mod: S$PBB,,, | Performed by: OPHTHALMOLOGY

## 2020-08-05 PROCEDURE — 99999 PR PBB SHADOW E&M-EST. PATIENT-LVL III: ICD-10-PCS | Mod: PBBFAC,,, | Performed by: OPHTHALMOLOGY

## 2020-08-05 PROCEDURE — 99999 PR PBB SHADOW E&M-EST. PATIENT-LVL III: CPT | Mod: PBBFAC,,, | Performed by: OPHTHALMOLOGY

## 2020-08-05 PROCEDURE — 99213 OFFICE O/P EST LOW 20 MIN: CPT | Mod: S$PBB,,, | Performed by: INTERNAL MEDICINE

## 2020-08-05 PROCEDURE — 92014 COMPRE OPH EXAM EST PT 1/>: CPT | Mod: S$PBB,,, | Performed by: OPHTHALMOLOGY

## 2020-08-05 RX ORDER — PREDNISOLONE ACETATE-GATIFLOXACIN-BROMFENAC .75; 5; 1 MG/ML; MG/ML; MG/ML
1 SUSPENSION/ DROPS OPHTHALMIC 3 TIMES DAILY
Qty: 5 ML | Refills: 3 | Status: SHIPPED | OUTPATIENT
Start: 2020-08-05 | End: 2020-10-16 | Stop reason: ALTCHOICE

## 2020-08-05 RX ORDER — MOXIFLOXACIN 5 MG/ML
1 SOLUTION/ DROPS OPHTHALMIC
Status: CANCELLED | OUTPATIENT
Start: 2020-08-05

## 2020-08-05 RX ORDER — TETRACAINE HYDROCHLORIDE 5 MG/ML
1 SOLUTION OPHTHALMIC
Status: CANCELLED | OUTPATIENT
Start: 2020-08-05

## 2020-08-05 RX ORDER — POLYETHYLENE GLYCOL-3350 AND ELECTROLYTES 236; 6.74; 5.86; 2.97; 22.74 G/274.31G; G/274.31G; G/274.31G; G/274.31G; G/274.31G
POWDER, FOR SOLUTION ORAL
COMMUNITY
Start: 2020-06-24 | End: 2021-09-08

## 2020-08-05 RX ORDER — SODIUM CHLORIDE 0.9 % (FLUSH) 0.9 %
10 SYRINGE (ML) INJECTION
Status: DISCONTINUED | OUTPATIENT
Start: 2020-08-05 | End: 2021-09-08

## 2020-08-05 RX ORDER — PHENYLEPHRINE HYDROCHLORIDE 25 MG/ML
1 SOLUTION/ DROPS OPHTHALMIC
Status: CANCELLED | OUTPATIENT
Start: 2020-08-05

## 2020-08-05 RX ORDER — SPIRONOLACTONE 50 MG/1
50 TABLET, FILM COATED ORAL EVERY MORNING
Qty: 90 TABLET | Refills: 3 | Status: SHIPPED | OUTPATIENT
Start: 2020-08-05 | End: 2020-08-13 | Stop reason: SDUPTHER

## 2020-08-05 RX ORDER — TROPICAMIDE 10 MG/ML
1 SOLUTION/ DROPS OPHTHALMIC
Status: CANCELLED | OUTPATIENT
Start: 2020-08-05

## 2020-08-05 RX ORDER — AMLODIPINE BESYLATE 2.5 MG/1
TABLET ORAL
Qty: 30 TABLET | Refills: 11 | Status: SHIPPED | OUTPATIENT
Start: 2020-08-05 | End: 2021-02-09

## 2020-08-05 NOTE — H&P (VIEW-ONLY)
HPI     64 YO female presents today for a cataract eval referred by Dr. Liriano. She   states that she is having trouble with blurred vision OU and glare at   night time is very bothersome. She notes also a few days after seeing Dr. Liriano she noticed red dots in her vision they did not last long and she has   not noticed them since then.     Last edited by Lorelei Barlow, PCT on 8/5/2020  1:58 PM. (History)            Assessment /Plan     For exam results, see Encounter Report.    Nuclear sclerosis, bilateral      Visually Significant Cataract: Patient reports decreased vision consistent with the clinical amount of lenticular opacity, which reaches the level of visual significance and affects activities of daily living. Risks, benefits, and alternatives to cataract surgery were discussed and the consent reviewed. IOL options were discussed, including ATIOLs and the associated side effects and additional patient cost associated with them.   IOL Selections:   Right eye  IOL: [id number]     Left eye  IOL: TFNT00 23.0    Pt wishes to have right eye done first.  The patient expresses a desire to reduce spectacle dependence. I reviewed various IOL and LASER refractive surgical options and we will attempt to minimize spectacle dependence by managing astigmatism and optimizing IOL selection. Femtosecond LASER assisted cataract surgery (FLACS) technology was explained to the patient with educational videos and discussion.  The patient voices understanding and wishes to implement this technology during the cataract procedure.  I explained the increased precision of the LASER versus manual techniques, especially as it relates to astigmatism reduction with arcuate incisions.  I emphasized that although our goal is to reduce the need for refractive correction after surgery, there may still be a need for spectacle correction to achieve optimal visual acuity, and that a reasonable range of functional vision should be the  expectation.  No guarantees are made about post operative refraction or visual acuity, as the eye may heal in unpredictable ways, and the standard risks, benefits, and alternatives to cataract surgery were explained.  The patient understands that the refractive portions of this cataract procedure are not covered by insurance, and that there is an out of pocket expense of $2250 per eye. I also explained that even though our pre-operative plan is to utilize advanced refractive technologies during surgery, that I may decide to eliminate part or all of this plan if surgical challenges or complications arise, or I feel that it is not in the patient's best interest. Consent forms and an ABN form were given to the patient to review.    Catalys Parameters:  Right Eye:   MONICA:  12mm   ?Need to gianna patient sitting up?: n  Capsulotomy: Scanned Capsule   stGstrstastdstest:st st1st Arcuate: OFF  Incisions: OFF  Left Eye:   MONICA:  12mm   ?Need to gianna patient sitting up?: n  Capsulotomy: Scanned Capsule  stGstrstastdstest:st st1st Arcuate:  OFF  Incisions:  OFF

## 2020-08-05 NOTE — PROGRESS NOTES
HPI     64 YO female presents today for a cataract eval referred by Dr. Liriano. She   states that she is having trouble with blurred vision OU and glare at   night time is very bothersome. She notes also a few days after seeing Dr. Liriano she noticed red dots in her vision they did not last long and she has   not noticed them since then.     Last edited by Lorelei Barlow, PCT on 8/5/2020  1:58 PM. (History)            Assessment /Plan     For exam results, see Encounter Report.    Nuclear sclerosis, bilateral      Visually Significant Cataract: Patient reports decreased vision consistent with the clinical amount of lenticular opacity, which reaches the level of visual significance and affects activities of daily living. Risks, benefits, and alternatives to cataract surgery were discussed and the consent reviewed. IOL options were discussed, including ATIOLs and the associated side effects and additional patient cost associated with them.   IOL Selections:   Right eye  IOL: [id number]     Left eye  IOL: TFNT00 23.0    Pt wishes to have right eye done first.  The patient expresses a desire to reduce spectacle dependence. I reviewed various IOL and LASER refractive surgical options and we will attempt to minimize spectacle dependence by managing astigmatism and optimizing IOL selection. Femtosecond LASER assisted cataract surgery (FLACS) technology was explained to the patient with educational videos and discussion.  The patient voices understanding and wishes to implement this technology during the cataract procedure.  I explained the increased precision of the LASER versus manual techniques, especially as it relates to astigmatism reduction with arcuate incisions.  I emphasized that although our goal is to reduce the need for refractive correction after surgery, there may still be a need for spectacle correction to achieve optimal visual acuity, and that a reasonable range of functional vision should be the  expectation.  No guarantees are made about post operative refraction or visual acuity, as the eye may heal in unpredictable ways, and the standard risks, benefits, and alternatives to cataract surgery were explained.  The patient understands that the refractive portions of this cataract procedure are not covered by insurance, and that there is an out of pocket expense of $2250 per eye. I also explained that even though our pre-operative plan is to utilize advanced refractive technologies during surgery, that I may decide to eliminate part or all of this plan if surgical challenges or complications arise, or I feel that it is not in the patient's best interest. Consent forms and an ABN form were given to the patient to review.    Catalys Parameters:  Right Eye:   MONICA:  12mm   ?Need to gianna patient sitting up?: n  Capsulotomy: Scanned Capsule   stGstrstastdstest:st st1st Arcuate: OFF  Incisions: OFF  Left Eye:   MONICA:  12mm   ?Need to gianna patient sitting up?: n  Capsulotomy: Scanned Capsule  stGstrstastdstest:st st1st Arcuate:  OFF  Incisions:  OFF

## 2020-08-05 NOTE — PROGRESS NOTES
Subjective:       Patient ID: Jyoti Tyler is a 65 y.o. female.    Chief Complaint: Follow-up  This dictation was performed using using [employer].     for years she took triamterene-hydrochlorothiazide 37.5-25 mg tablet on a daily basis  Six years ago she had a cholecystectomy and has had fairly frequent watery diarrhea, ever since  Watery diarrhea occurs probably once a week  She has gradually become hypokalemic  See most recent labs    She is not happy taking 2 tablets and she is not happy taking a potassium supplement    We decided to stop irbesartan, stop hydrochlorothiazide 25 mg daily and stop the potassium supplement.    She has been completely asymptomatic despite these electrolyte abnormalities and has no abnormal kidney function.    The plan will be to try spironolactone 50 mg tablet once daily and monitor her blood pressure daily.  She is in the digital medicine hypertension program.  Should she have a blood pressure spike such as 150/80 or greater, I have provided her with a prescription for amlodipine 2.5 mg tablet which she can take if needed for a blood pressure spike and if she continues to have trouble 2 hr after taking the amlodipine tablet she may take a 2nd tablet.    I will leave it to her to schedule a follow-up appointment.    She is scheduled to have a colonoscopy tomorrow and she wants to cancel that.  She will reschedule a colonoscopy at a later date.  HPI  Review of Systems   Gastrointestinal: Positive for diarrhea.   All other systems reviewed and are negative.      Objective:      Physical Exam  Vitals signs and nursing note reviewed.   Constitutional:       Appearance: Normal appearance.   HENT:      Head: Normocephalic and atraumatic.   Eyes:      General: No scleral icterus.     Conjunctiva/sclera: Conjunctivae normal.   Cardiovascular:      Rate and Rhythm: Normal rate.   Pulmonary:      Effort: Pulmonary effort is normal.   Musculoskeletal:         General: No swelling.    Neurological:      Mental Status: She is alert.         Assessment:       1. Essential hypertension    2. Hypokalemia    3. Screen for colon cancer        Plan:   Jyoti was seen today for follow-up.    Diagnoses and all orders for this visit:    Essential hypertension    Hypokalemia    Screen for colon cancer  -     Case request GI: COLONOSCOPY    Other orders  -     spironolactone (ALDACTONE) 50 MG tablet; Take 1 tablet (50 mg total) by mouth every morning.  -     amLODIPine (NORVASC) 2.5 MG tablet; Take one if needed for a blood pressure spike 150/80 may repeat taking one in 2 hours if needed      Medication List with Changes/Refills   New Medications    AMLODIPINE (NORVASC) 2.5 MG TABLET    Take one if needed for a blood pressure spike 150/80 may repeat taking one in 2 hours if needed    SPIRONOLACTONE (ALDACTONE) 50 MG TABLET    Take 1 tablet (50 mg total) by mouth every morning.   Current Medications    FLUTICASONE PROPIONATE (FLONASE) 50 MCG/ACTUATION NASAL SPRAY    2 sprays (100 mcg total) by Each Nostril route once daily.   Discontinued Medications    HYDROCHLOROTHIAZIDE (HYDRODIURIL) 25 MG TABLET    Take 1 tablet (25 mg total) by mouth once daily.    IRBESARTAN (AVAPRO) 75 MG TABLET    Take 1 tablet (75 mg total) by mouth once daily.    POTASSIUM CHLORIDE SA (K-DUR,KLOR-CON) 10 MEQ TABLET    Take 2 tablets (20 mEq total) by mouth once daily.

## 2020-08-05 NOTE — LETTER
August 5, 2020      Letty Liriano, OD  [address]  [facility] [address]           [facility] - Ophthalmology  [address]  [facility] [address]  Phone: [phone]  Fax: [phone]          Patient: Jyoti Tyler   MR Number: [id number]   YOB: 1955   Date of Visit: 8/5/2020       Dear Dr. Letty Liriano:    Thank you for referring Jyoti Tyler to me for evaluation. Attached you will find relevant portions of my assessment and plan of care.    If you have questions, please do not hesitate to call me. I look forward to following Jyoti Tyler along with you.    Sincerely,    Ambika Begum MD    Enclosure  CC:  No Recipients    If you would like to receive this communication electronically, please contact [email or web address] or [phone] to request more information on [employer] Link access.    For providers and/or their staff who would like to refer a patient to [facility], please contact us through our one-stop-shop provider referral line, [facility], at [phone].    If you feel you have received this communication in error or would no longer like to receive these types of communications, please e-mail [email or web address]

## 2020-08-10 ENCOUNTER — PATIENT MESSAGE (OUTPATIENT)
Dept: INTERNAL MEDICINE | Facility: CLINIC | Age: 65
End: 2020-08-10

## 2020-08-10 ENCOUNTER — HOSPITAL ENCOUNTER (OUTPATIENT)
Dept: RADIOLOGY | Facility: HOSPITAL | Age: 65
Discharge: HOME | End: 2020-08-10
Attending: INTERNAL MEDICINE
Payer: MEDICARE

## 2020-08-10 VITALS — WEIGHT: 161.38 LBS | BODY MASS INDEX: 27.55 KG/M2 | HEIGHT: 64 IN

## 2020-08-10 DIAGNOSIS — E87.6 HYPOKALEMIA: ICD-10-CM

## 2020-08-10 DIAGNOSIS — Z12.31 ENCOUNTER FOR SCREENING MAMMOGRAM FOR BREAST CANCER: ICD-10-CM

## 2020-08-10 DIAGNOSIS — I10 ESSENTIAL HYPERTENSION: Primary | ICD-10-CM

## 2020-08-10 PROCEDURE — 77063 BREAST TOMOSYNTHESIS BI: CPT | Mod: 26,,, | Performed by: RADIOLOGY

## 2020-08-10 PROCEDURE — 77067 MAMMO DIGITAL SCREENING BILAT WITH TOMOSYNTHESIS_CAD: ICD-10-PCS | Mod: 26,,, | Performed by: RADIOLOGY

## 2020-08-10 PROCEDURE — 77067 SCR MAMMO BI INCL CAD: CPT | Mod: 26,,, | Performed by: RADIOLOGY

## 2020-08-10 PROCEDURE — 77067 SCR MAMMO BI INCL CAD: CPT | Mod: TC

## 2020-08-10 PROCEDURE — 77063 MAMMO DIGITAL SCREENING BILAT WITH TOMOSYNTHESIS_CAD: ICD-10-PCS | Mod: 26,,, | Performed by: RADIOLOGY

## 2020-08-10 NOTE — TELEPHONE ENCOUNTER
She sent this e-mail today:    Dr Eaton   I have been taking the new BP medication since Thursday and my feet and ankles are enormous.  My BP is elevated. But not dangerous.    I think I will need something stronger to alleviate the fluid retention    She had a potassium of 2.8 in July. It looks like she may be having cataract surgery later this month and must have her Potassium rechecked. Her digital med blood pressure report looks good.  The medication is spironolactone.  Before I can give her any additional medication she needs to do a BMP. Please schedule.  She can take two (100 mg ) of spironolactone and restrict sodium in the mean time.

## 2020-08-11 NOTE — TELEPHONE ENCOUNTER
The patient has been notified of this information and all questions answered.  Lab work is scheduled for 08/12/20

## 2020-08-12 ENCOUNTER — LAB VISIT (OUTPATIENT)
Dept: LAB | Facility: HOSPITAL | Age: 65
End: 2020-08-12
Attending: INTERNAL MEDICINE
Payer: MEDICARE

## 2020-08-12 DIAGNOSIS — E87.6 HYPOKALEMIA: ICD-10-CM

## 2020-08-12 LAB
ANION GAP SERPL CALC-SCNC: 6 MMOL/L (ref 8–16)
BUN SERPL-MCNC: 18 MG/DL (ref 8–23)
CALCIUM SERPL-MCNC: 9.3 MG/DL (ref 8.7–10.5)
CHLORIDE SERPL-SCNC: 106 MMOL/L (ref 95–110)
CO2 SERPL-SCNC: 26 MMOL/L (ref 23–29)
CREAT SERPL-MCNC: 0.8 MG/DL (ref 0.5–1.4)
EST. GFR  (AFRICAN AMERICAN): >60 ML/MIN/1.73 M^2
EST. GFR  (NON AFRICAN AMERICAN): >60 ML/MIN/1.73 M^2
GLUCOSE SERPL-MCNC: 91 MG/DL (ref 70–110)
POTASSIUM SERPL-SCNC: 4.9 MMOL/L (ref 3.5–5.1)
SODIUM SERPL-SCNC: 138 MMOL/L (ref 136–145)

## 2020-08-12 PROCEDURE — 80048 BASIC METABOLIC PNL TOTAL CA: CPT

## 2020-08-12 PROCEDURE — 36415 COLL VENOUS BLD VENIPUNCTURE: CPT

## 2020-08-13 ENCOUNTER — TELEPHONE (OUTPATIENT)
Dept: RADIOLOGY | Facility: HOSPITAL | Age: 65
End: 2020-08-13

## 2020-08-13 NOTE — TELEPHONE ENCOUNTER
Spoke with patient and explained mammogram findings.Patient expressed understanding of results. Patient scheduled abnormal mammogram follow up appointment at [facility] on 8/24/2020.

## 2020-08-18 DIAGNOSIS — H25.11 NUCLEAR SCLEROTIC CATARACT OF RIGHT EYE: Primary | ICD-10-CM

## 2020-08-18 DIAGNOSIS — H25.12 NUCLEAR SCLEROTIC CATARACT OF LEFT EYE: Primary | ICD-10-CM

## 2020-08-19 NOTE — PROGRESS NOTES
Digital Medicine: Health  Follow-Up         The history is provided by the patient.             Reason for review: Blood pressure not at goal    Patient needs assistance troubleshooting: patient reminder needed.    Additional Follow-up details: Patient reports that she is doing well and apologizes for being hard to get in touch with. Patient AVG BP is not within goal and readings factoring into this BP all came from 8/10/20. Patient hadn't submitted a BP reading since June before this. Reminded patient that we need at least 1 reading per week going forward. Patient reported a medication change and now she is only taking spironolactone. Inquired about diet and activity levels with not much of a response. Patient reports no s/s or issues with medication change. Will f/u in 4-6 weeks.        Diet-no change to diet  No 24 hour dietary recall  No change to diet.  Patient reports eating or drinking the following: When inquiring about diet patient did not give me much information and changed the subject. Will try to get more information at next encounter.       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 Patient did not give much information beyond that and continued on to another subject.       Medication Adherence-Medication adherence was asssessed.  Patient continue taking medication as prescribed.          Patient reports medication was changed to just spironolactone because potassium levels were low. Patient reports no s/s or issues with BP medication.   Substance, Sleep, Stress-Not assessed      Additional monitoring needed.  Continue current diet/physical activity routine.       Addressed any questions or concerns and patient has my contact information if needed prior to next outreach. Patient verbalizes understanding.          There are no preventive care reminders to display for this  patient.    Last 5 Patient Entered Readings                                      Current 30 Day Average: 136/69     Recent Readings 8/10/2020 8/10/2020 8/10/2020 8/10/2020 8/10/2020    SBP (mmHg) 136 131 137 124 61    DBP (mmHg) 71 68 80 74 53    Pulse 80 82 81 76 80

## 2020-08-24 ENCOUNTER — HOSPITAL ENCOUNTER (OUTPATIENT)
Dept: RADIOLOGY | Facility: HOSPITAL | Age: 65
Discharge: HOME | End: 2020-08-24
Attending: INTERNAL MEDICINE
Payer: MEDICARE

## 2020-08-24 DIAGNOSIS — R92.8 ABNORMAL MAMMOGRAM: ICD-10-CM

## 2020-08-24 PROCEDURE — 77061 BREAST TOMOSYNTHESIS UNI: CPT | Mod: 26,,, | Performed by: RADIOLOGY

## 2020-08-24 PROCEDURE — 76642 ULTRASOUND BREAST LIMITED: CPT | Mod: 26,RT,, | Performed by: RADIOLOGY

## 2020-08-24 PROCEDURE — 77061 MAMMO DIGITAL DIAGNOSTIC RIGHT WITH TOMOSYNTHESIS_CAD: ICD-10-PCS | Mod: 26,,, | Performed by: RADIOLOGY

## 2020-08-24 PROCEDURE — 76642 US BREAST RIGHT LIMITED: ICD-10-PCS | Mod: 26,RT,, | Performed by: RADIOLOGY

## 2020-08-24 PROCEDURE — 77065 DX MAMMO INCL CAD UNI: CPT | Mod: 26,,, | Performed by: RADIOLOGY

## 2020-08-24 PROCEDURE — 77065 DX MAMMO INCL CAD UNI: CPT | Mod: TC

## 2020-08-24 PROCEDURE — 76642 ULTRASOUND BREAST LIMITED: CPT | Mod: TC,RT

## 2020-08-24 PROCEDURE — 77065 MAMMO DIGITAL DIAGNOSTIC RIGHT WITH TOMOSYNTHESIS_CAD: ICD-10-PCS | Mod: 26,,, | Performed by: RADIOLOGY

## 2020-08-31 ENCOUNTER — LAB VISIT (OUTPATIENT)
Dept: SURGERY | Facility: CLINIC | Age: 65
End: 2020-08-31
Payer: MEDICARE

## 2020-08-31 DIAGNOSIS — Z01.818 PRE-OP TESTING: ICD-10-CM

## 2020-08-31 LAB — SARS-COV-2 RNA RESP QL NAA+PROBE: NOT DETECTED

## 2020-08-31 PROCEDURE — U0003 INFECTIOUS AGENT DETECTION BY NUCLEIC ACID (DNA OR RNA); SEVERE ACUTE RESPIRATORY SYNDROME CORONAVIRUS 2 (SARS-COV-2) (CORONAVIRUS DISEASE [COVID-19]), AMPLIFIED PROBE TECHNIQUE, MAKING USE OF HIGH THROUGHPUT TECHNOLOGIES AS DESCRIBED BY CMS-2020-01-R: HCPCS

## 2020-09-01 ENCOUNTER — TELEPHONE (OUTPATIENT)
Dept: OPHTHALMOLOGY | Facility: CLINIC | Age: 65
End: 2020-09-01

## 2020-09-01 NOTE — TELEPHONE ENCOUNTER
Left a  for patient.Told them their surgery arrival time, which is 830am, on 9/3/2020. Nothing to eat or drink after 9pm, the night before surgery. May have water, gatorade, or powerade after 9pm, until leaving home the morning of surgery. Start drops on Tuesday, one drop TID into operative eye. 9/1/20 TR

## 2020-09-03 ENCOUNTER — ANESTHESIA EVENT (OUTPATIENT)
Dept: SURGERY | Facility: OTHER | Age: 65
End: 2020-09-03
Payer: MEDICARE

## 2020-09-03 ENCOUNTER — HOSPITAL ENCOUNTER (OUTPATIENT)
Facility: OTHER | Age: 65
Discharge: HOME | End: 2020-09-03
Attending: OPHTHALMOLOGY | Admitting: OPHTHALMOLOGY
Payer: MEDICARE

## 2020-09-03 ENCOUNTER — ANESTHESIA (OUTPATIENT)
Dept: SURGERY | Facility: OTHER | Age: 65
End: 2020-09-03
Payer: MEDICARE

## 2020-09-03 VITALS
DIASTOLIC BLOOD PRESSURE: 63 MMHG | OXYGEN SATURATION: 99 % | HEART RATE: 71 BPM | SYSTOLIC BLOOD PRESSURE: 117 MMHG | RESPIRATION RATE: 16 BRPM | TEMPERATURE: 98 F | BODY MASS INDEX: 27.31 KG/M2 | HEIGHT: 64 IN | WEIGHT: 160 LBS

## 2020-09-03 DIAGNOSIS — H25.13 NUCLEAR SCLEROSIS, BILATERAL: ICD-10-CM

## 2020-09-03 PROCEDURE — 25000003 PHARM REV CODE 250: Performed by: OPHTHALMOLOGY

## 2020-09-03 PROCEDURE — 63600175 PHARM REV CODE 636 W HCPCS: Performed by: REGISTERED NURSE

## 2020-09-03 PROCEDURE — 71000015 HC POSTOP RECOV 1ST HR: Performed by: OPHTHALMOLOGY

## 2020-09-03 PROCEDURE — 37000009 HC ANESTHESIA EA ADD 15 MINS: Performed by: OPHTHALMOLOGY

## 2020-09-03 PROCEDURE — 66984 PR REMOVAL, CATARACT, W/INSRT INTRAOC LENS, W/O ENDO CYCLO: ICD-10-PCS | Mod: RT,,, | Performed by: OPHTHALMOLOGY

## 2020-09-03 PROCEDURE — 36000707: Performed by: OPHTHALMOLOGY

## 2020-09-03 PROCEDURE — V2788 PRESBYOPIA-CORRECT FUNCTION: HCPCS | Performed by: OPHTHALMOLOGY

## 2020-09-03 PROCEDURE — 36000706: Performed by: OPHTHALMOLOGY

## 2020-09-03 PROCEDURE — 66999 PR FEMTO MFIOL: ICD-10-PCS | Mod: CSM,RT,, | Performed by: OPHTHALMOLOGY

## 2020-09-03 PROCEDURE — 66999 UNLISTED PX ANT SEGMENT EYE: CPT | Mod: CSM,RT,, | Performed by: OPHTHALMOLOGY

## 2020-09-03 PROCEDURE — 37000008 HC ANESTHESIA 1ST 15 MINUTES: Performed by: OPHTHALMOLOGY

## 2020-09-03 PROCEDURE — 66984 XCAPSL CTRC RMVL W/O ECP: CPT | Mod: RT,,, | Performed by: OPHTHALMOLOGY

## 2020-09-03 RX ORDER — PHENYLEPHRINE HYDROCHLORIDE 100 MG/ML
1 SOLUTION/ DROPS OPHTHALMIC
Status: COMPLETED | OUTPATIENT
Start: 2020-09-03 | End: 2020-09-03

## 2020-09-03 RX ORDER — MOXIFLOXACIN 5 MG/ML
1 SOLUTION/ DROPS OPHTHALMIC
Status: COMPLETED | OUTPATIENT
Start: 2020-09-03 | End: 2020-09-03

## 2020-09-03 RX ORDER — PROPARACAINE HYDROCHLORIDE 5 MG/ML
1 SOLUTION/ DROPS OPHTHALMIC
Status: DISCONTINUED | OUTPATIENT
Start: 2020-09-03 | End: 2020-09-03 | Stop reason: HOSPADM

## 2020-09-03 RX ORDER — MIDAZOLAM HYDROCHLORIDE 1 MG/ML
INJECTION INTRAMUSCULAR; INTRAVENOUS
Status: DISCONTINUED | OUTPATIENT
Start: 2020-09-03 | End: 2020-09-03

## 2020-09-03 RX ORDER — TETRACAINE HYDROCHLORIDE 5 MG/ML
1 SOLUTION OPHTHALMIC
Status: COMPLETED | OUTPATIENT
Start: 2020-09-03 | End: 2020-09-03

## 2020-09-03 RX ORDER — TROPICAMIDE 10 MG/ML
1 SOLUTION/ DROPS OPHTHALMIC
Status: COMPLETED | OUTPATIENT
Start: 2020-09-03 | End: 2020-09-03

## 2020-09-03 RX ORDER — MOXIFLOXACIN 5 MG/ML
1 SOLUTION/ DROPS OPHTHALMIC
Status: DISCONTINUED | OUTPATIENT
Start: 2020-09-03 | End: 2020-09-03 | Stop reason: HOSPADM

## 2020-09-03 RX ORDER — TETRACAINE HYDROCHLORIDE 5 MG/ML
SOLUTION OPHTHALMIC
Status: DISCONTINUED | OUTPATIENT
Start: 2020-09-03 | End: 2020-09-03 | Stop reason: HOSPADM

## 2020-09-03 RX ORDER — LIDOCAINE HYDROCHLORIDE 40 MG/ML
INJECTION, SOLUTION RETROBULBAR
Status: DISCONTINUED | OUTPATIENT
Start: 2020-09-03 | End: 2020-09-03 | Stop reason: HOSPADM

## 2020-09-03 RX ORDER — ACETAMINOPHEN 325 MG/1
650 TABLET ORAL EVERY 4 HOURS PRN
Status: DISCONTINUED | OUTPATIENT
Start: 2020-09-03 | End: 2020-09-03 | Stop reason: HOSPADM

## 2020-09-03 RX ORDER — PHENYLEPHRINE HYDROCHLORIDE 25 MG/ML
1 SOLUTION/ DROPS OPHTHALMIC
Status: COMPLETED | OUTPATIENT
Start: 2020-09-03 | End: 2020-09-03

## 2020-09-03 RX ORDER — MOXIFLOXACIN 5 MG/ML
SOLUTION/ DROPS OPHTHALMIC
Status: DISCONTINUED | OUTPATIENT
Start: 2020-09-03 | End: 2020-09-03 | Stop reason: HOSPADM

## 2020-09-03 RX ADMIN — PHENYLEPHRINE HYDROCHLORIDE 1 DROP: 25 SOLUTION/ DROPS OPHTHALMIC at 09:09

## 2020-09-03 RX ADMIN — MOXIFLOXACIN 1 DROP: 5 SOLUTION/ DROPS OPHTHALMIC at 09:09

## 2020-09-03 RX ADMIN — TETRACAINE HYDROCHLORIDE 1 DROP: 5 SOLUTION OPHTHALMIC at 09:09

## 2020-09-03 RX ADMIN — MIDAZOLAM HYDROCHLORIDE 2 MG: 1 INJECTION, SOLUTION INTRAMUSCULAR; INTRAVENOUS at 10:09

## 2020-09-03 RX ADMIN — TROPICAMIDE 1 DROP: 10 SOLUTION/ DROPS OPHTHALMIC at 09:09

## 2020-09-03 RX ADMIN — MOXIFLOXACIN 1 DROP: 5 SOLUTION/ DROPS OPHTHALMIC at 11:09

## 2020-09-03 NOTE — ANESTHESIA PREPROCEDURE EVALUATION
09/03/2020  Jyoti Tyler is a 65 y.o., female.    Anesthesia Evaluation    I have reviewed the Patient Summary Reports.    I have reviewed the Nursing Notes. I have reviewed the NPO Status.   I have reviewed the Medications.     Review of Systems  Anesthesia Hx:  Denies Family Hx of Anesthesia complications.   Denies Personal Hx of Anesthesia complications.   Social:  Former Smoker    Hematology/Oncology:  Hematology Normal   Oncology Normal     Cardiovascular:   Exercise tolerance: good Hypertension    Pulmonary:  Pulmonary Normal    Renal/:  Renal/ Normal     Hepatic/GI:  Hepatic/GI Normal    Neurological:  Neurology Normal    Endocrine:  Endocrine Normal        Physical Exam  General:  Well nourished    Airway/Jaw/Neck:  Airway Findings: Mouth Opening: Normal Tongue: Normal  General Airway Assessment: Adult  Mallampati: II  TM Distance: Normal, at least 6 cm      Dental:  Dental Findings: In tact        Mental Status:  Mental Status Findings:  Alert and Oriented, Cooperative         Anesthesia Plan  Type of Anesthesia, risks & benefits discussed:  Anesthesia Type:  MAC  Patient's Preference:   Intra-op Monitoring Plan: standard ASA monitors  Intra-op Monitoring Plan Comments:   Post Op Pain Control Plan:   Post Op Pain Control Plan Comments:   Induction:    Beta Blocker:         Informed Consent: Patient understands risks and agrees with Anesthesia plan.  Questions answered. Anesthesia consent signed with patient.  ASA Score: 2     Day of Surgery Review of History & Physical:    H&P update referred to the surgeon.         Ready For Surgery From Anesthesia Perspective.

## 2020-09-03 NOTE — OP NOTE
SURGEON:  Ambika Begum M.D.    PREOPERATIVE DIAGNOSIS:    Nuclear Sclerotic Cataract Right Eye    POSTOPERATIVE DIAGNOSIS:    Nuclear Sclerotic Cataract Right Eye    PROCEDURES:    Phacoemulsification with  intraocular lens, Right eye (41693)  With Femtosecond LASER assist    DATE OF SURGERY: 09/03/2020    IMPLANT: TFNT00 24.5    ANESTHESIA:  MAC with topical Lidocaine    COMPLICATIONS:  None    ESTIMATED BLOOD LOSS: None    SPECIMENS: None    INDICATIONS:    The patient has a history of painless progressive visual loss and  difficulty with activities of daily living secondary to cataract formation.  After a thorough discussion of the risks, benefits, and alternatives to cataract surgery, including, but not limited to, the rare risks of infection, retinal detachment, hemorrhage, need for additional surgery, loss of vision, and even loss of the eye, the patient voices understanding and desires to proceed.    DESCRIPTION OF PROCEDURE:    After verification of consent and marking of the operative eye, the patient was positioned under the femtosecond LASER. Topical anesthetic drops were administered. A surgical timeout was initiated with verification of patient identifiers and the laser surgical plan. The eye was docked securely and the laser portion of the cataract procedure was carried out without complication.  The patient was returned to the pre-operative area to await the intraocular surgical portion of the cataract procedure.  The patients IOL calculations were reviewed, and the lens selection confirmed.   After verification and marking of the proper eye in the preop holding area, the patient was brought to the operating room in supine position where the eye was prepped and draped in standard sterile fashion with 5% Betadine and a lid speculum placed in the eye.   Topical 4% Lidocaine was used in addition to the preoperative anesthesia and the procedure was begun by the creation of a paracentesis incision through  which viscoelastic was used to fill the anterior chamber.  Next, a keratome blade was used to create a triplanar temporal clear corneal incision and a cystotome and Utrata forceps used to fashion a continuous curvilinear capsulorrhexis.  Hydrodissection was carried out using the Stanley hydrodissection cannula and the nucleus was found to be mobile.  Phacoemulsification of the nucleus was carried out using a quick chop technique, and all remaining epinuclear and cortical material was removed.  The eye was then reformed with Viscoelastic and the  intraocular lens was implanted into the capsular bag.  All remaining viscoelastics were removed from the eye and at the end of the case the pupil was round, the lens was well-centered within the capsular bag and all wounds were found to be water tight.  Drops of Vigamox and Pred Forte were instilled and a shield was placed over the eye. The patient will follow up with Dr. Begum in the morning.

## 2020-09-03 NOTE — PLAN OF CARE
Jyoti Eubanks Nayeli has met all discharge criteria from Phase II. Vital Signs are stable, ambulating  without difficulty. Discharge instructions given, patient verbalized understanding. Discharged from facility via wheelchair in stable condition.

## 2020-09-03 NOTE — DISCHARGE SUMMARY
Outcome: Successful outpatient ophthalmic surgical procedure  Preprinted Instructions given to patient.  Regular diet.  Activity: No restrictions  Meds: see Med Rec  Condition: stable  Follow up: 1 day with Dr Begum  Disposition: Home  Diagnosis: s/p eye surgery

## 2020-09-03 NOTE — DISCHARGE INSTRUCTIONS
Anesthesia: Monitored Anesthesia Care (MAC)  Anesthesia safety  Tips for anesthesia safety include the following:   · Follow all instructions you are given for how long not to eat or drink before your procedure.  · Be sure your healthcare provider knows what medicines you take, especially any anti-inflammatory medicine or blood thinners. This includes aspirin and any other over-the-counter medicines, herbs, and supplements.  · Have an adult family member or friend drive you home after the procedure.  · For the first 24 hours after your surgery:  ¨ Do not drive or use heavy equipment.  ¨ Do not make important decisions or sign documents.  ¨ Avoid alcohol.  ¨ Have someone stay with you, if possible. They can watch for problems and help keep you safe.  Date Last Reviewed: 12/1/2016  © 1654-0722 [employer]. [address] [place] [address], [place] [address]. All rights reserved. This information is not intended as a substitute for professional medical care. Always follow your healthcare professional's instructions.      FOLLOW ANY OTHER INSTRUCTIONS GIVEN TO YOU BY DR MACKENZIE

## 2020-09-04 ENCOUNTER — OFFICE VISIT (OUTPATIENT)
Dept: OPHTHALMOLOGY | Facility: CLINIC | Age: 65
End: 2020-09-04
Attending: OPHTHALMOLOGY
Payer: MEDICARE

## 2020-09-04 DIAGNOSIS — Z98.890 POST-OPERATIVE STATE: Primary | ICD-10-CM

## 2020-09-04 DIAGNOSIS — H25.11 NUCLEAR SCLEROTIC CATARACT OF RIGHT EYE: ICD-10-CM

## 2020-09-04 PROCEDURE — 99213 OFFICE O/P EST LOW 20 MIN: CPT | Mod: PBBFAC | Performed by: OPHTHALMOLOGY

## 2020-09-04 PROCEDURE — 99024 POSTOP FOLLOW-UP VISIT: CPT | Mod: POP,,, | Performed by: OPHTHALMOLOGY

## 2020-09-04 PROCEDURE — 99999 PR PBB SHADOW E&M-EST. PATIENT-LVL III: CPT | Mod: PBBFAC,,, | Performed by: OPHTHALMOLOGY

## 2020-09-04 PROCEDURE — 99999 PR PBB SHADOW E&M-EST. PATIENT-LVL III: ICD-10-PCS | Mod: PBBFAC,,, | Performed by: OPHTHALMOLOGY

## 2020-09-04 PROCEDURE — 99024 PR POST-OP FOLLOW-UP VISIT: ICD-10-PCS | Mod: POP,,, | Performed by: OPHTHALMOLOGY

## 2020-09-04 NOTE — PROGRESS NOTES
HPI     Post-op Evaluation      Additional comments: 1 day post-op               Comments     POD 1 CE with PanOptix OD     Pt states doing well, reading without glasses today.  No pain or   discomfort OU.    PGB tid OD           Last edited by Rakel Reese on 9/4/2020  9:23 AM. (History)            Assessment /Plan     For exam results, see Encounter Report.    Post-operative state    Nuclear sclerotic cataract of right eye      Slit lamp exam OD:  L/L: nl  K: clear, wound sealed  AC: 1+ cell  Lens: IOL centered and stable    POD1 s/p Phaco/IOL OD  Appropriate precautions and post op medications reviewed.  Patient instructed to call or come in if symptoms of redness, decreased vision, or pain are experienced.

## 2020-09-11 ENCOUNTER — OFFICE VISIT (OUTPATIENT)
Dept: OPHTHALMOLOGY | Facility: CLINIC | Age: 65
End: 2020-09-11
Attending: OPHTHALMOLOGY
Payer: MEDICARE

## 2020-09-11 DIAGNOSIS — Z98.890 POST-OPERATIVE STATE: Primary | ICD-10-CM

## 2020-09-11 DIAGNOSIS — H25.11 NUCLEAR SCLEROTIC CATARACT OF RIGHT EYE: ICD-10-CM

## 2020-09-11 DIAGNOSIS — H25.12 NUCLEAR SCLEROSIS OF LEFT EYE: ICD-10-CM

## 2020-09-11 PROCEDURE — 99024 PR POST-OP FOLLOW-UP VISIT: ICD-10-PCS | Mod: POP,,, | Performed by: OPHTHALMOLOGY

## 2020-09-11 PROCEDURE — 99999 PR PBB SHADOW E&M-EST. PATIENT-LVL III: ICD-10-PCS | Mod: PBBFAC,,, | Performed by: OPHTHALMOLOGY

## 2020-09-11 PROCEDURE — 99213 OFFICE O/P EST LOW 20 MIN: CPT | Mod: PBBFAC | Performed by: OPHTHALMOLOGY

## 2020-09-11 PROCEDURE — 99024 POSTOP FOLLOW-UP VISIT: CPT | Mod: POP,,, | Performed by: OPHTHALMOLOGY

## 2020-09-11 PROCEDURE — 92136 IOL MASTER - OU - BOTH EYES: ICD-10-PCS | Mod: 26,S$PBB,LT, | Performed by: OPHTHALMOLOGY

## 2020-09-11 PROCEDURE — 92136 OPHTHALMIC BIOMETRY: CPT | Mod: PBBFAC | Performed by: OPHTHALMOLOGY

## 2020-09-11 PROCEDURE — 99999 PR PBB SHADOW E&M-EST. PATIENT-LVL III: CPT | Mod: PBBFAC,,, | Performed by: OPHTHALMOLOGY

## 2020-09-11 RX ORDER — SODIUM CHLORIDE 0.9 % (FLUSH) 0.9 %
10 SYRINGE (ML) INJECTION
Status: DISCONTINUED | OUTPATIENT
Start: 2020-09-11 | End: 2021-09-08

## 2020-09-11 RX ORDER — TROPICAMIDE 10 MG/ML
1 SOLUTION/ DROPS OPHTHALMIC
Status: CANCELLED | OUTPATIENT
Start: 2020-09-11

## 2020-09-11 RX ORDER — PHENYLEPHRINE HYDROCHLORIDE 25 MG/ML
1 SOLUTION/ DROPS OPHTHALMIC
Status: CANCELLED | OUTPATIENT
Start: 2020-09-11

## 2020-09-11 RX ORDER — TETRACAINE HYDROCHLORIDE 5 MG/ML
1 SOLUTION OPHTHALMIC
Status: CANCELLED | OUTPATIENT
Start: 2020-09-11

## 2020-09-11 RX ORDER — MOXIFLOXACIN 5 MG/ML
1 SOLUTION/ DROPS OPHTHALMIC
Status: CANCELLED | OUTPATIENT
Start: 2020-09-11

## 2020-09-11 NOTE — H&P (VIEW-ONLY)
HPI     POW 1 CE with PanOptix OD     PGB tid OD     Patient reports that she is doing well with the right eye. No pain, using   the drops as directed in the right eye. patient states that she can read   well with out glasses.     Last edited by Ye Walls on 9/11/2020  9:07 AM. (History)            Assessment /Plan     For exam results, see Encounter Report.    Post-operative state    Nuclear sclerotic cataract of right eye      Slit lamp exam:  L/L: nl  K: clear, wound sealed  AC: trace cell  Iris/Lens: IOL centered and stable    POW1 s/p phaco: Surgery healing well with no signs of infection or abnormal inflammation.    Patient wishes to proceed with surgery in the second eye. Risks, benefits, alternatives reviewed. IOL selection reviewed.     Right eye  IOL: TFNT00 24.5, planned and implanted with excellent result     Left eye  IOL: TFNT00 23.0    The patient expresses a desire to reduce spectacle dependence. I reviewed various IOL and LASER refractive surgical options and we will attempt to minimize spectacle dependence by managing astigmatism and optimizing IOL selection. Femtosecond LASER assisted cataract surgery (FLACS) technology was explained to the patient with educational videos and discussion.  The patient voices understanding and wishes to implement this technology during the cataract procedure.  I explained the increased precision of the LASER versus manual techniques, especially as it relates to astigmatism reduction with arcuate incisions.  I emphasized that although our goal is to reduce the need for refractive correction after surgery, there may still be a need for spectacle correction to achieve optimal visual acuity, and that a reasonable range of functional vision should be the expectation.  No guarantees are made about post operative refraction or visual acuity, as the eye may heal in unpredictable ways, and the standard risks, benefits, and alternatives to cataract surgery were  explained.  The patient understands that the refractive portions of this cataract procedure are not covered by insurance, and that there is an out of pocket expense of $2250 per eye. I also explained that even though our pre-operative plan is to utilize advanced refractive technologies during surgery, that I may decide to eliminate part or all of this plan if surgical challenges or complications arise, or I feel that it is not in the patient's best interest. Consent forms and an ABN form were given to the patient to review.    Catalys Parameters:    Left Eye:   MONICA:  12mm   ?Need to gianna patient sitting up?: n  Capsulotomy: Scanned Capsule  stGstrstastdstest:st st1st Arcuate:  OFF  Incisions:  OFF

## 2020-09-17 ENCOUNTER — TELEPHONE (OUTPATIENT)
Dept: OPHTHALMOLOGY | Facility: CLINIC | Age: 65
End: 2020-09-17

## 2020-09-17 NOTE — TELEPHONE ENCOUNTER
Spoke with patient. Told them their surgery arrival time, which is 7am, on 9/21/2020. Nothing to eat or drink after 9pm, the night before surgery. May have water, gatorade, or powerade after 9pm, until leaving home the morning of surgery. Start drops on Saturday, one drop TID into operative eye. 9/17/20 TR

## 2020-09-18 ENCOUNTER — LAB VISIT (OUTPATIENT)
Dept: SURGERY | Facility: CLINIC | Age: 65
End: 2020-09-18
Payer: MEDICARE

## 2020-09-18 DIAGNOSIS — Z01.818 PRE-OP TESTING: ICD-10-CM

## 2020-09-18 PROCEDURE — U0003 INFECTIOUS AGENT DETECTION BY NUCLEIC ACID (DNA OR RNA); SEVERE ACUTE RESPIRATORY SYNDROME CORONAVIRUS 2 (SARS-COV-2) (CORONAVIRUS DISEASE [COVID-19]), AMPLIFIED PROBE TECHNIQUE, MAKING USE OF HIGH THROUGHPUT TECHNOLOGIES AS DESCRIBED BY CMS-2020-01-R: HCPCS

## 2020-09-19 LAB — SARS-COV-2 RNA RESP QL NAA+PROBE: NOT DETECTED

## 2020-09-21 ENCOUNTER — ANESTHESIA EVENT (OUTPATIENT)
Dept: SURGERY | Facility: OTHER | Age: 65
End: 2020-09-21
Payer: MEDICARE

## 2020-09-21 ENCOUNTER — ANESTHESIA (OUTPATIENT)
Dept: SURGERY | Facility: OTHER | Age: 65
End: 2020-09-21
Payer: MEDICARE

## 2020-09-21 ENCOUNTER — HOSPITAL ENCOUNTER (OUTPATIENT)
Facility: OTHER | Age: 65
Discharge: HOME | End: 2020-09-21
Attending: OPHTHALMOLOGY | Admitting: OPHTHALMOLOGY
Payer: MEDICARE

## 2020-09-21 VITALS
RESPIRATION RATE: 16 BRPM | HEART RATE: 80 BPM | HEIGHT: 64 IN | TEMPERATURE: 99 F | DIASTOLIC BLOOD PRESSURE: 64 MMHG | BODY MASS INDEX: 27.31 KG/M2 | WEIGHT: 160 LBS | SYSTOLIC BLOOD PRESSURE: 124 MMHG | OXYGEN SATURATION: 98 %

## 2020-09-21 DIAGNOSIS — Z98.890 POST-OPERATIVE STATE: ICD-10-CM

## 2020-09-21 DIAGNOSIS — H25.12 NUCLEAR SCLEROTIC CATARACT OF LEFT EYE: Primary | ICD-10-CM

## 2020-09-21 DIAGNOSIS — H25.12 NUCLEAR SCLEROSIS OF LEFT EYE: ICD-10-CM

## 2020-09-21 PROCEDURE — 66984 PR REMOVAL, CATARACT, W/INSRT INTRAOC LENS, W/O ENDO CYCLO: ICD-10-PCS | Mod: 79,LT,, | Performed by: OPHTHALMOLOGY

## 2020-09-21 PROCEDURE — 25000003 PHARM REV CODE 250: Performed by: OPHTHALMOLOGY

## 2020-09-21 PROCEDURE — 66999 PR FEMTO MFIOL: ICD-10-PCS | Mod: CSM,LT,, | Performed by: OPHTHALMOLOGY

## 2020-09-21 PROCEDURE — 71000015 HC POSTOP RECOV 1ST HR: Performed by: OPHTHALMOLOGY

## 2020-09-21 PROCEDURE — 63600175 PHARM REV CODE 636 W HCPCS: Performed by: NURSE ANESTHETIST, CERTIFIED REGISTERED

## 2020-09-21 PROCEDURE — 66999 UNLISTED PX ANT SEGMENT EYE: CPT | Mod: CSM,LT,, | Performed by: OPHTHALMOLOGY

## 2020-09-21 PROCEDURE — V2788 PRESBYOPIA-CORRECT FUNCTION: HCPCS | Performed by: OPHTHALMOLOGY

## 2020-09-21 PROCEDURE — 66984 XCAPSL CTRC RMVL W/O ECP: CPT | Mod: 79,LT,, | Performed by: OPHTHALMOLOGY

## 2020-09-21 PROCEDURE — 36000706: Performed by: OPHTHALMOLOGY

## 2020-09-21 PROCEDURE — 37000009 HC ANESTHESIA EA ADD 15 MINS: Performed by: OPHTHALMOLOGY

## 2020-09-21 PROCEDURE — 25000003 PHARM REV CODE 250: Performed by: NURSE ANESTHETIST, CERTIFIED REGISTERED

## 2020-09-21 PROCEDURE — 36000707: Performed by: OPHTHALMOLOGY

## 2020-09-21 PROCEDURE — 37000008 HC ANESTHESIA 1ST 15 MINUTES: Performed by: OPHTHALMOLOGY

## 2020-09-21 DEVICE — IMPLANTABLE DEVICE: Type: IMPLANTABLE DEVICE | Site: EYE | Status: FUNCTIONAL

## 2020-09-21 RX ORDER — MIDAZOLAM HYDROCHLORIDE 1 MG/ML
INJECTION INTRAMUSCULAR; INTRAVENOUS
Status: DISCONTINUED | OUTPATIENT
Start: 2020-09-21 | End: 2020-09-21

## 2020-09-21 RX ORDER — LIDOCAINE HYDROCHLORIDE 40 MG/ML
INJECTION, SOLUTION RETROBULBAR
Status: DISCONTINUED | OUTPATIENT
Start: 2020-09-21 | End: 2020-09-21 | Stop reason: HOSPADM

## 2020-09-21 RX ORDER — PROPARACAINE HYDROCHLORIDE 5 MG/ML
1 SOLUTION/ DROPS OPHTHALMIC
Status: DISCONTINUED | OUTPATIENT
Start: 2020-09-21 | End: 2020-09-21 | Stop reason: HOSPADM

## 2020-09-21 RX ORDER — MOXIFLOXACIN 5 MG/ML
1 SOLUTION/ DROPS OPHTHALMIC
Status: COMPLETED | OUTPATIENT
Start: 2020-09-21 | End: 2020-09-21

## 2020-09-21 RX ORDER — PHENYLEPHRINE HYDROCHLORIDE 25 MG/ML
1 SOLUTION/ DROPS OPHTHALMIC
Status: COMPLETED | OUTPATIENT
Start: 2020-09-21 | End: 2020-09-21

## 2020-09-21 RX ORDER — MOXIFLOXACIN 5 MG/ML
SOLUTION/ DROPS OPHTHALMIC
Status: DISCONTINUED | OUTPATIENT
Start: 2020-09-21 | End: 2020-09-21 | Stop reason: HOSPADM

## 2020-09-21 RX ORDER — TETRACAINE HYDROCHLORIDE 5 MG/ML
1 SOLUTION OPHTHALMIC
Status: COMPLETED | OUTPATIENT
Start: 2020-09-21 | End: 2020-09-21

## 2020-09-21 RX ORDER — SODIUM CHLORIDE 9 MG/ML
INJECTION, SOLUTION INTRAVENOUS CONTINUOUS PRN
Status: DISCONTINUED | OUTPATIENT
Start: 2020-09-21 | End: 2020-09-21

## 2020-09-21 RX ORDER — TROPICAMIDE 10 MG/ML
1 SOLUTION/ DROPS OPHTHALMIC
Status: COMPLETED | OUTPATIENT
Start: 2020-09-21 | End: 2020-09-21

## 2020-09-21 RX ORDER — TETRACAINE HYDROCHLORIDE 5 MG/ML
SOLUTION OPHTHALMIC
Status: DISCONTINUED | OUTPATIENT
Start: 2020-09-21 | End: 2020-09-21 | Stop reason: HOSPADM

## 2020-09-21 RX ORDER — PHENYLEPHRINE HYDROCHLORIDE 100 MG/ML
1 SOLUTION/ DROPS OPHTHALMIC
Status: COMPLETED | OUTPATIENT
Start: 2020-09-21 | End: 2020-09-21

## 2020-09-21 RX ORDER — ACETAMINOPHEN 325 MG/1
650 TABLET ORAL EVERY 4 HOURS PRN
Status: DISCONTINUED | OUTPATIENT
Start: 2020-09-21 | End: 2020-09-21 | Stop reason: HOSPADM

## 2020-09-21 RX ADMIN — MIDAZOLAM HYDROCHLORIDE 2 MG: 1 INJECTION, SOLUTION INTRAMUSCULAR; INTRAVENOUS at 08:09

## 2020-09-21 RX ADMIN — TROPICAMIDE 1 DROP: 10 SOLUTION/ DROPS OPHTHALMIC at 07:09

## 2020-09-21 RX ADMIN — MOXIFLOXACIN 1 DROP: 5 SOLUTION/ DROPS OPHTHALMIC at 07:09

## 2020-09-21 RX ADMIN — MOXIFLOXACIN 1 DROP: 5 SOLUTION/ DROPS OPHTHALMIC at 09:09

## 2020-09-21 RX ADMIN — PHENYLEPHRINE HYDROCHLORIDE 1 DROP: 25 SOLUTION/ DROPS OPHTHALMIC at 07:09

## 2020-09-21 RX ADMIN — MIDAZOLAM HYDROCHLORIDE 1 MG: 1 INJECTION, SOLUTION INTRAMUSCULAR; INTRAVENOUS at 08:09

## 2020-09-21 RX ADMIN — TETRACAINE HYDROCHLORIDE 1 DROP: 5 SOLUTION OPHTHALMIC at 07:09

## 2020-09-21 RX ADMIN — SODIUM CHLORIDE: 9 INJECTION, SOLUTION INTRAVENOUS at 08:09

## 2020-09-21 NOTE — OP NOTE
SURGEON:  Ambika Begum M.D.    PREOPERATIVE DIAGNOSIS:    Nuclear Sclerotic Cataract Left Eye    POSTOPERATIVE DIAGNOSIS:    Nuclear Sclerotic Cataract Left Eye    PROCEDURES:    Phacoemulsification with  intraocular lens, Left eye (80248)  With Femtosecond LASER assist    DATE OF SURGERY: 09/21/2020    IMPLANT: TFNT00 23.0    ANESTHESIA:  MAC with topical Lidocaine    COMPLICATIONS:  None    ESTIMATED BLOOD LOSS: None    SPECIMENS: None    INDICATIONS:    The patient has a history of painless progressive visual loss and  difficulty with activities of daily living secondary to cataract formation.  After a thorough discussion of the risks, benefits, and alternatives to cataract surgery, including, but not limited to, the rare risks of infection, retinal detachment, hemorrhage, need for additional surgery, loss of vision, and even loss of the eye, the patient voices understanding and desires to proceed.    DESCRIPTION OF PROCEDURE:    After verification of consent and marking of the operative eye, the patient was positioned under the femtosecond LASER. Topical anesthetic drops were administered. A surgical timeout was initiated with verification of patient identifiers and the laser surgical plan. The eye was docked securely and the laser portion of the cataract procedure was carried out without complication.  The patient was returned to the pre-operative area to await the intraocular surgical portion of the cataract procedure.  The patients IOL calculations were reviewed, and the lens selection confirmed.   After verification and marking of the proper eye in the preop holding area, the patient was brought to the operating room in supine position where the eye was prepped and draped in standard sterile fashion with 5% Betadine and a lid speculum placed in the eye.   Topical 4% Lidocaine was used in addition to the preoperative anesthesia and the procedure was begun by the creation of a paracentesis incision through  which viscoelastic was used to fill the anterior chamber.  Next, a keratome blade was used to create a triplanar temporal clear corneal incision and a cystotome and Utrata forceps used to fashion a continuous curvilinear capsulorrhexis.  Hydrodissection was carried out using the Stanley hydrodissection cannula and the nucleus was found to be mobile.  Phacoemulsification of the nucleus was carried out using a quick chop technique, and all remaining epinuclear and cortical material was removed.  The eye was then reformed with Viscoelastic and the  intraocular lens was implanted into the capsular bag.  All remaining viscoelastics were removed from the eye and at the end of the case the pupil was round, the lens was well-centered within the capsular bag and all wounds were found to be water tight.  Drops of Vigamox and Pred Forte were instilled and a shield was placed over the eye. The patient will follow up with Dr. Begum in the morning.

## 2020-09-21 NOTE — ANESTHESIA PREPROCEDURE EVALUATION
09/21/2020  Jyoti Tyler is a 65 y.o., female.    Anesthesia Evaluation    I have reviewed the Patient Summary Reports.    I have reviewed the Nursing Notes. I have reviewed the NPO Status.   I have reviewed the Medications.     Review of Systems  Anesthesia Hx:  History of prior surgery of interest to airway management or planning: Previous anesthesia: MAC  9/3/20 phaco, rec'd versed 2mg, did well with MAC.  Denies Family Hx of Anesthesia complications.   Denies Personal Hx of Anesthesia complications.   Social:  Former Smoker    Hematology/Oncology:  Hematology Normal   Oncology Normal     EENT/Dental:EENT/Dental Normal   Cardiovascular:   Exercise tolerance: good Hypertension    Pulmonary:  Pulmonary Normal    Renal/:  Renal/ Normal     Hepatic/GI:  Hepatic/GI Normal    Musculoskeletal:  Musculoskeletal Normal    Neurological:  Neurology Normal    Endocrine:  Endocrine Normal    Dermatological:  Skin Normal    Psych:  Psychiatric Normal           Physical Exam  General:  Well nourished    Airway/Jaw/Neck:  Airway Findings: Mouth Opening: Normal Tongue: Normal  General Airway Assessment: Adult  Mallampati: II  TM Distance: Normal, at least 6 cm      Dental:  Dental Findings: In tact        Mental Status:  Mental Status Findings:  Alert and Oriented, Cooperative         Anesthesia Plan  Type of Anesthesia, risks & benefits discussed:  Anesthesia Type:  MAC  Patient's Preference:   Intra-op Monitoring Plan: standard ASA monitors  Intra-op Monitoring Plan Comments:   Post Op Pain Control Plan:   Post Op Pain Control Plan Comments:   Induction:    Beta Blocker:         Informed Consent: Patient understands risks and agrees with Anesthesia plan.  Questions answered. Anesthesia consent signed with patient.  ASA Score: 2     Day of Surgery Review of History & Physical:    H&P update referred to the  surgeon.         Ready For Surgery From Anesthesia Perspective.

## 2020-09-21 NOTE — DISCHARGE INSTRUCTIONS
Anesthesia: Monitored Anesthesia Care (MAC)  Anesthesia safety  Tips for anesthesia safety include the following:   · Follow all instructions you are given for how long not to eat or drink before your procedure.  · Be sure your healthcare provider knows what medicines you take, especially any anti-inflammatory medicine or blood thinners. This includes aspirin and any other over-the-counter medicines, herbs, and supplements.  · Have an adult family member or friend drive you home after the procedure.  · For the first 24 hours after your surgery:  ¨ Do not drive or use heavy equipment.  ¨ Do not make important decisions or sign documents.  ¨ Avoid alcohol.  ¨ Have someone stay with you, if possible. They can watch for problems and help keep you safe.  Date Last Reviewed: 12/1/2016 © 2000-2017 [employer]. [address] [place] [address], [place] [address]. All rights reserved. This information is not intended as a substitute for professional medical care. Always follow your healthcare professional's instructions.

## 2020-09-21 NOTE — ANESTHESIA POSTPROCEDURE EVALUATION
Anesthesia Post Evaluation    Patient: Jyoti Tyler    Procedure(s) Performed: Procedure(s) (LRB):  EXTRACTION, CATARACT, WITH IOL INSERTION (Left)    Final Anesthesia Type: MAC    Patient location during evaluation: [facility]  Patient participation: Yes- Able to Participate  Level of consciousness: awake and alert and oriented  Post-procedure vital signs: reviewed and stable  Pain management: adequate  Airway patency: patent    PONV status at discharge: No PONV  Anesthetic complications: no      Cardiovascular status: stable, hemodynamically stable and blood pressure returned to baseline  Respiratory status: unassisted, spontaneous ventilation and room air  Hydration status: euvolemic  Follow-up not needed.          Vitals Value Taken Time   BP  09/21/20 0908   Temp  09/21/20 0908   Pulse  09/21/20 0908   Resp  09/21/20 0908   SpO2  09/21/20 0908         No case tracking events are documented in the log.      Pain/Balbina Score: No data recorded

## 2020-09-22 ENCOUNTER — OFFICE VISIT (OUTPATIENT)
Dept: OPHTHALMOLOGY | Facility: CLINIC | Age: 65
End: 2020-09-22
Attending: OPHTHALMOLOGY
Payer: MEDICARE

## 2020-09-22 DIAGNOSIS — H25.13 NUCLEAR SCLEROSIS, BILATERAL: ICD-10-CM

## 2020-09-22 DIAGNOSIS — Z98.890 POST-OPERATIVE STATE: Primary | ICD-10-CM

## 2020-09-22 PROCEDURE — 99999 PR PBB SHADOW E&M-EST. PATIENT-LVL III: ICD-10-PCS | Mod: PBBFAC,,, | Performed by: OPHTHALMOLOGY

## 2020-09-22 PROCEDURE — 99999 PR PBB SHADOW E&M-EST. PATIENT-LVL III: CPT | Mod: PBBFAC,,, | Performed by: OPHTHALMOLOGY

## 2020-09-22 PROCEDURE — 99213 OFFICE O/P EST LOW 20 MIN: CPT | Mod: PBBFAC | Performed by: OPHTHALMOLOGY

## 2020-09-22 PROCEDURE — 99024 POSTOP FOLLOW-UP VISIT: CPT | Mod: POP,,, | Performed by: OPHTHALMOLOGY

## 2020-09-22 PROCEDURE — 99024 PR POST-OP FOLLOW-UP VISIT: ICD-10-PCS | Mod: POP,,, | Performed by: OPHTHALMOLOGY

## 2020-09-22 NOTE — PROGRESS NOTES
HPI     ONE DAY POST-OP    Phacoemulsification with  intraocular lens, Left eye (29677)  With Femtosecond LASER assist  DATE OF SURGERY: 09/21/2020  IMPLANT: TFNT00 23.0    Drops:    Phaco w/ IOL OD 09/03/2020    Patient reports that OS is doing well this evening, no pain or discomfort.   Using drops as directed         Last edited by Ye Walls on 9/22/2020 12:29 PM. (History)            Assessment /Plan     For exam results, see Encounter Report.    Post-operative state    Nuclear sclerosis, bilateral      Slit lamp exam:  L/L: nl  K: clear, wound sealed  AC: 1+ cell  Lens: IOL centered and stable    POD1 s/p Phaco/IOL  Appropriate precautions and post op medications reviewed.  Patient instructed to call or come in if symptoms of redness, decreased vision, or pain are experienced.

## 2020-10-16 ENCOUNTER — OFFICE VISIT (OUTPATIENT)
Dept: OPTOMETRY | Facility: CLINIC | Age: 65
End: 2020-10-16
Payer: MEDICARE

## 2020-10-16 DIAGNOSIS — Z98.42 S/P BILATERAL CATARACT EXTRACTION: Primary | ICD-10-CM

## 2020-10-16 DIAGNOSIS — Z98.41 S/P BILATERAL CATARACT EXTRACTION: Primary | ICD-10-CM

## 2020-10-16 PROCEDURE — 99212 OFFICE O/P EST SF 10 MIN: CPT | Mod: PBBFAC,25 | Performed by: OPTOMETRIST

## 2020-10-16 PROCEDURE — 99024 POSTOP FOLLOW-UP VISIT: CPT | Mod: POP,,, | Performed by: OPTOMETRIST

## 2020-10-16 PROCEDURE — 92134 CPTRZ OPH DX IMG PST SGM RTA: CPT | Mod: PBBFAC | Performed by: OPTOMETRIST

## 2020-10-16 PROCEDURE — 99999 PR PBB SHADOW E&M-EST. PATIENT-LVL II: ICD-10-PCS | Mod: PBBFAC,,, | Performed by: OPTOMETRIST

## 2020-10-16 PROCEDURE — 99999 PR PBB SHADOW E&M-EST. PATIENT-LVL II: CPT | Mod: PBBFAC,,, | Performed by: OPTOMETRIST

## 2020-10-16 PROCEDURE — 99024 PR POST-OP FOLLOW-UP VISIT: ICD-10-PCS | Mod: POP,,, | Performed by: OPTOMETRIST

## 2020-10-16 RX ORDER — IRBESARTAN 75 MG/1
TABLET ORAL
COMMUNITY
Start: 2020-08-28 | End: 2021-02-09

## 2020-10-16 NOTE — PROGRESS NOTES
HPI     Post-op Evaluation      Additional comments: 3 wk phaco OS              Comments     Last eye exam was 9/22/20 with Dr. Begum.  09/03/2020 IMPLANT: TFNT00 24.5 OD  09/21/2020 IMPLANT: TFNT00 23.0 OS    Pt here for 1 month PO.   Pt has no complaints as of today about vision.   Patient denies diplopia, headaches, flashes/floaters, and pain.  No problems with eye drops since sx.           Last edited by Alisha Hunter on 10/16/2020  8:10 AM. (History)            Assessment /Plan     For exam results, see Encounter Report.    S/P bilateral cataract extraction  -     OCT- Retina          1.  Pt doing well.  No rx given.  No CME OU  Retina flat and intact OU--no holes, tears, breaks, or RDs.    RTC 1 year for routine exam.

## 2020-10-22 ENCOUNTER — PATIENT OUTREACH (OUTPATIENT)
Dept: OTHER | Facility: OTHER | Age: 65
End: 2020-10-22

## 2020-11-09 ENCOUNTER — PATIENT MESSAGE (OUTPATIENT)
Dept: INTERNAL MEDICINE | Facility: CLINIC | Age: 65
End: 2020-11-09

## 2020-11-09 DIAGNOSIS — M25.519 SHOULDER PAIN, UNSPECIFIED CHRONICITY, UNSPECIFIED LATERALITY: Primary | ICD-10-CM

## 2020-11-10 ENCOUNTER — PATIENT OUTREACH (OUTPATIENT)
Dept: ADMINISTRATIVE | Facility: OTHER | Age: 65
End: 2020-11-10

## 2020-11-10 ENCOUNTER — OFFICE VISIT (OUTPATIENT)
Dept: ORTHOPEDICS | Facility: CLINIC | Age: 65
End: 2020-11-10
Attending: FAMILY MEDICINE
Payer: MEDICARE

## 2020-11-10 ENCOUNTER — HOSPITAL ENCOUNTER (OUTPATIENT)
Dept: RADIOLOGY | Facility: HOSPITAL | Age: 65
Discharge: HOME | End: 2020-11-10
Attending: PHYSICIAN ASSISTANT
Payer: MEDICARE

## 2020-11-10 VITALS — HEIGHT: 64 IN | BODY MASS INDEX: 27.33 KG/M2 | WEIGHT: 160.06 LBS

## 2020-11-10 DIAGNOSIS — M25.512 LEFT SHOULDER PAIN, UNSPECIFIED CHRONICITY: Primary | ICD-10-CM

## 2020-11-10 DIAGNOSIS — M25.812 SHOULDER IMPINGEMENT, LEFT: ICD-10-CM

## 2020-11-10 DIAGNOSIS — M25.512 LEFT SHOULDER PAIN, UNSPECIFIED CHRONICITY: ICD-10-CM

## 2020-11-10 DIAGNOSIS — M25.519 SHOULDER PAIN, UNSPECIFIED CHRONICITY, UNSPECIFIED LATERALITY: ICD-10-CM

## 2020-11-10 PROCEDURE — 73030 XR SHOULDER COMPLETE 2 OR MORE VIEWS LEFT: ICD-10-PCS | Mod: 26,LT,, | Performed by: RADIOLOGY

## 2020-11-10 PROCEDURE — 99203 OFFICE O/P NEW LOW 30 MIN: CPT | Mod: S$PBB,,, | Performed by: PHYSICIAN ASSISTANT

## 2020-11-10 PROCEDURE — 99999 PR PBB SHADOW E&M-EST. PATIENT-LVL III: ICD-10-PCS | Mod: PBBFAC,,, | Performed by: PHYSICIAN ASSISTANT

## 2020-11-10 PROCEDURE — 99203 PR OFFICE/OUTPT VISIT, NEW, LEVL III, 30-44 MIN: ICD-10-PCS | Mod: S$PBB,,, | Performed by: PHYSICIAN ASSISTANT

## 2020-11-10 PROCEDURE — 99213 OFFICE O/P EST LOW 20 MIN: CPT | Mod: PBBFAC,25 | Performed by: PHYSICIAN ASSISTANT

## 2020-11-10 PROCEDURE — 73030 X-RAY EXAM OF SHOULDER: CPT | Mod: 26,LT,, | Performed by: RADIOLOGY

## 2020-11-10 PROCEDURE — 73030 X-RAY EXAM OF SHOULDER: CPT | Mod: TC,LT

## 2020-11-10 PROCEDURE — 99999 PR PBB SHADOW E&M-EST. PATIENT-LVL III: CPT | Mod: PBBFAC,,, | Performed by: PHYSICIAN ASSISTANT

## 2020-11-10 RX ORDER — NAPROXEN 500 MG/1
500 TABLET ORAL 2 TIMES DAILY WITH MEALS
Qty: 60 TABLET | Refills: 0 | Status: SHIPPED | OUTPATIENT
Start: 2020-11-10 | End: 2020-12-10

## 2020-11-10 NOTE — PROGRESS NOTES
Subjective:      Patient ID: Jyoti Tyler is a 65 y.o. female.    Chief Complaint: Pain of the Left Shoulder    HPI  65 year old female presents with chief complaint of left shoulder pain x 1 month. She is RHD and denies trauma. Pain is worse with abduction and going overhead. She reports limited ROM. She took advil a couple of times but is unsure if it helped. She is scheduled to start PT next week.   Review of Systems   Constitution: Negative for chills, fever and night sweats.   Cardiovascular: Negative for chest pain.   Respiratory: Negative for cough and shortness of breath.    Hematologic/Lymphatic: Does not bruise/bleed easily.   Skin: Negative for color change.   Gastrointestinal: Negative for heartburn.   Genitourinary: Negative for dysuria.   Neurological: Negative for numbness and paresthesias.   Psychiatric/Behavioral: Negative for altered mental status.   Allergic/Immunologic: Negative for persistent infections.         Objective:            General    Vitals reviewed.  Constitutional: She is oriented to person, place, and time. She appears well-developed and well-nourished.   Cardiovascular: Normal rate.    Neurological: She is alert and oriented to person, place, and time.             Left Shoulder Exam     Range of Motion   Active abduction:  150 abnormal   Forward Flexion:  140 abnormal   External Rotation 0 degrees: abnormal   Internal rotation 0 degrees: abnormal     Tests & Signs   Driscoll test: positive  Impingement: positive  Speed's Test: negative    Other   Sensation: normal       Muscle Strength   Left Upper Extremity  Shoulder Abduction: 5/5   Supraspinatus: 5/5   Biceps: 5/5     Vascular Exam       Left Pulses      Radial:                    2+          X-ray: ordered and reviewed by myself. Bony structures of the left shoulder appear normal no fracture or soft tissue calcification is identified.        Assessment:       Encounter Diagnoses   Name Primary?    Shoulder impingement,  left     Left shoulder pain, unspecified chronicity Yes          Plan:       Discussed treatment options with patient. She will take naproxen BID x 2 weeks. Alternate ice and heat. She will start PT next week. She wants to avoid injection at this time. RTC if symptoms worsen or do not improve.

## 2020-11-10 NOTE — PROGRESS NOTES
Patient's chart was reviewed for overdue AMELIA topics.  Open case request for colonoscopy.  Immunizations reconciled.

## 2020-11-10 NOTE — LETTER
November 12, 2020      Judy Eaton MD  [address]  [facility] [address]           [facility] 5th Floor  [address]  [facility] [address]  Phone: [phone]  Fax: [phone]          Patient: Jyoti Tyler   MR Number: [id number]   YOB: 1955   Date of Visit: 11/10/2020       Dear Dr. Judy Eaton:    Thank you for referring Jyoti Tyler to me for evaluation. Attached you will find relevant portions of my assessment and plan of care.    If you have questions, please do not hesitate to call me. I look forward to following Jyoti Tyler along with you.    Sincerely,    SHERMAN Goodsonosure  CC:  No Recipients    If you would like to receive this communication electronically, please contact [email or web address] or [phone] to request more information on [employer] access.    For providers and/or their staff who would like to refer a patient to [facility], please contact us through our one-stop-shop provider referral line, [facility], at [phone].    If you feel you have received this communication in error or would no longer like to receive these types of communications, please e-mail [email or web address]

## 2020-11-23 ENCOUNTER — CLINICAL SUPPORT (OUTPATIENT)
Dept: REHABILITATION | Facility: OTHER | Age: 65
End: 2020-11-23
Payer: MEDICARE

## 2020-11-23 DIAGNOSIS — R29.3 POSTURE ABNORMALITY: ICD-10-CM

## 2020-11-23 DIAGNOSIS — M25.519 SHOULDER PAIN, UNSPECIFIED CHRONICITY, UNSPECIFIED LATERALITY: ICD-10-CM

## 2020-11-23 DIAGNOSIS — R29.898 IMPAIRED STRENGTH OF SHOULDER MUSCLES: ICD-10-CM

## 2020-11-23 DIAGNOSIS — M25.612 DECREASED RANGE OF MOTION OF LEFT SHOULDER: ICD-10-CM

## 2020-11-23 DIAGNOSIS — M25.512 ACUTE PAIN OF LEFT SHOULDER: ICD-10-CM

## 2020-11-23 PROCEDURE — 97161 PT EVAL LOW COMPLEX 20 MIN: CPT | Mod: PN

## 2020-11-23 PROCEDURE — 97110 THERAPEUTIC EXERCISES: CPT | Mod: PN

## 2020-11-23 NOTE — PLAN OF CARE
[facility]  Physical Therapy Initial Evaluation    Name: Jyoti Tyler  Clinic Number: [id number]    Therapy Diagnosis:   Encounter Diagnoses   Name Primary?    Shoulder pain, unspecified chronicity, unspecified laterality     Decreased range of motion of left shoulder     Impaired strength of shoulder muscles     Acute pain of left shoulder     Posture abnormality      Physician: Judy Eaton MD    Physician Orders: PT Eval and Treat  Medical Diagnosis from Referral: Shoulder pain, unspecified chronicity, unspecified laterality (M25.519)  Evaluation Date: 11/23/2020  Authorization Period Expiration: 11/09/2021  Plan of Care Expiration: 03/12/2021  Visit # / Visits authorized: 1/ 1    Time In: 8:30 am  Time Out: 9:15 am  Total Billable Time: 45 minutes    Precautions: Standard    Subjective   Date of onset: about a month ago  History of current condition - Jyoti reports: she didn't do anything and her L shoulder has now started hurting her.  Activities directly in front of her like typing are okay but she has difficulty lifting things over her head and reaching out to the side. She went to see the doctor and was prescribed naproxin but that has provided little relief for symptoms. Right hand dominant; She notes burning in her R side with computer work but denies burning when not working at computer.  Denies tingling/numbness in L UE. Patient denies dizziness, headaches, blurry vision, nausea, vomiting, impaired sensations in groin and saddle region and changes in bowel/bladder or weight.           Medical History:   Past Medical History:   Diagnosis Date    Anisometropia     Cataract     HTN (hypertension) 11/7/2012    Hyperopia     OD    Hypertension     Osteoporosis screening        Surgical History:   Jyoti Tyler  has a past surgical history that includes Cholecystectomy; Cataract extraction w/  intraocular lens implant (Right, 9/3/2020); and Cataract  extraction w/  intraocular lens implant (Left, 9/21/2020).    Medications:   Jyoti has a current medication list which includes the following prescription(s): amlodipine, fluticasone propionate, gavilyte-g, irbesartan, naproxen, and spironolactone, and the following Facility-Administered Medications: sodium chloride 0.9% and sodium chloride 0.9%.    Allergies:   Review of patient's allergies indicates:   Allergen Reactions    No known drug allergies         Imaging, x-ray: FINDINGS:  Bony structures of the left shoulder appear normal no fracture or soft tissue calcification is identified.     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  Prior Level of Function: Pt independent with all ADLs, job responsibilities and participating in regular physical activity   Current Level of Function: Pt has difficulty with all ADLs, job responsibilities and participating in regular physical activity     Pain:  Current 0/10, worst 7/10, best 0/10   Location: left shoulder   Description: Sharp, ache  Aggravating Factors: reaching back behind her; lifting, reaching overhead  Easing Factors: heating pad and rest    Pts goals: increase ROM and use of L UE    Objective     Observation:   FHP, rounded shoulders in sitting and standing    Palpation:  Increased tissue tension over UT and LS Bilat      Range of Motion: measured in degrees     Cervical AROM:  R sidebending: Tightness  L sidebending: Tightness     Right AROM/PROM Left AROM/PROM   Flexion 152 112 P! /160 P!    Abduction 155 100 P! /115 P!    Extension 47 40 P!    Internal Rotation T8 Iliac Crest P!    External Rotation T3 C7 P!      IR at 90 (PROM) 41 degrees with pain  ER at 90 (PROM) 70 degrees with pain  Strength/MMT: ! = pain    Shoulder Right Left   Flexion 4-/5 3/5 P!     Abduction 4-/5 3/5 P!    Extension 5/5 3/5 P!    Internal Rotation 5/5 5/5   External Rotation 3+/5 3+/5   Elbow     Flexion 4/5 4/5   Extension 4/5 4/5           Special Tests:        Right Left   Empty Can Test (-) (+)   Drop Arm test (-) (-)   Subscaputlaris Lift Off (-) (+)   Hawkin's Kenndy (-) (+)   Scapular retraction test (-) (+) Increased ROM, strength and decreased pain            CMS Impairment/Limitation/Restriction for FOTO Shoulder Survey    Therapist reviewed FOTO scores for Jyoti Tyler on 11/23/2020.   FOTO documents entered into [employer] - see Media section.    Limitation Score: 51%  Category: Carrying    Current : CK = at least 40% but < 60% impaired, limited or restricted  Goal: CJ = at least 20% but < 40% impaired, limited or restricted             TREATMENT   Treatment Time In: 9:05 am  Treatment Time Out: 9:15 am  Total Treatment time separate from Evaluation: 10 minutes    Jyoti received therapeutic exercises to develop strength, endurance, ROM, flexibility and posture for 10 minutes including:    Scapular retraction  Upper Trap stretch  Levator Scap stretch  Chin tuck supine        Home Exercises and Patient Education Provided    Education provided:   Patient was educated on initial evaluation findings and expectations as well as future PT services, procedures, and expectations for optimal compliance with therapy.     Written Home Exercises Provided: Yes, see patient instructions.     Assessment   Jyoti is a 65 y.o. female referred to outpatient Physical Therapy with a medical diagnosis of Shoulder pain, unspecified chronicity, unspecified laterality. Pt presents with decreased ROM, strength, flexibility, positive special tests indicating scapular weakness/positioning and RTC involvement affecting movement, poor posture and decreased use of L UE causing increased pain and decreased participation with work, recreational and household duties.     Pt prognosis is Good.   Pt will benefit from  skilled outpatient Physical Therapy to address the deficits stated above and in the chart below, provide pt/family education, and to maximize pt's level of independence to return to PLOF.     Plan of care discussed with patient: Yes  Pt's spiritual, cultural and educational needs considered and patient is agreeable to the plan of care and goals as stated below:     Anticipated Barriers for therapy: none    Medical Necessity is demonstrated by the following  History  Co-morbidities and personal factors that may impact the plan of care Co-morbidities:   HTN and osteoporosis    Personal Factors:   age     low   Examination  Body Structures and Functions, activity limitations and participation restrictions that may impact the plan of care Body Regions:   neck  upper extremities  trunk    Body Systems:    gross symmetry  ROM  strength  motor control  motor learning  blood pressure    Participation Restrictions:   Decreased ADL performance, difficulty cooking, cleaning and performing household chores    Activity limitations:   Learning and applying knowledge  no deficits    General Tasks and Commands  no deficits    Communication  no deficits    Mobility  lifting and carrying objects    Self care  washing oneself (bathing, drying, washing hands)  caring for body parts (brushing teeth, shaving, grooming)  dressing  looking after one's health    Domestic Life  shopping  cooking  doing house work (cleaning house, washing dishes, laundry)    Interactions/Relationships  no deficits    Life Areas  no deficits    Community and Social Life  no deficits         moderate   Clinical Presentation stable and uncomplicated low   Decision Making/ Complexity Score: low     Goals:    In 6 weeks,   Goal Status   1. Patient will be independent with HEP to promote improved therapy outcomes.  STG    2. Pt will increase pain free L shoulder abduction PROM to >/= 160 deg to improve functional mobility of UE STG    3. Pt will increase L shoulder  AROM by 10 degrees in flex/ext/abd to improve  STG    4. Patient will require min VC from PT for proper scapular retraction in order to improve postural awareness. STG        In 12 weeks,  Goal Status   5. Patient will be independent with progressed HEP to self manage symptoms.  LTG    6. Pt will increase L shoulder AROM so that R=L to improve functional use of L UE. LTG    7. Pt will improve L shoulder MMTs to = 4/5 to promote equal use of B UEs in performing functional tasks. LTG    8. Pt will lift 5 lb objects from counter to overhead without pain to put dishes away     9. Pt to report pain </= 2/10 with ADLs and IADLs using L UE to get dressed and perform household chores easier.      10. Patient will improve FOTO to </= 34% limitation to demonstrate improved functional mobility.               Plan   Plan of care Certification: 11/23/2020 to 03/12/2021.    Outpatient Physical Therapy 2 times weekly for 24 visits to include the following interventions: Iontophoresis (with dexamethasone), Manual Therapy, Moist Heat/ Ice, Neuromuscular Re-ed, Patient Education, Therapeutic Activites, Therapeutic Exercise, Ultrasound and dry needling.     Karyn Fiore, PT

## 2020-11-23 NOTE — PATIENT INSTRUCTIONS
Access Code: [email or web address]   URL: [email or web address]   Date: 11/23/2020   Prepared by: Karyn Fiore     Exercises  Seated Scapular Retraction - 20 reps - 1 sets - 5 second hold - 1x daily  Seated Upper Trapezius Stretch - 3 reps - 1 sets - 30 hold - 1x daily  Seated Levator Scapulae Stretch - 3 reps - 1 sets - 30 hold - 1x daily  Supine Chin Tuck - 20 reps - 1 sets - 5 hold - 1x daily

## 2020-11-30 ENCOUNTER — LAB VISIT (OUTPATIENT)
Dept: INTERNAL MEDICINE | Facility: CLINIC | Age: 65
End: 2020-11-30
Attending: FAMILY MEDICINE
Payer: MEDICARE

## 2020-11-30 ENCOUNTER — NURSE TRIAGE (OUTPATIENT)
Dept: ADMINISTRATIVE | Facility: CLINIC | Age: 65
End: 2020-11-30

## 2020-11-30 DIAGNOSIS — R05.9 COUGH: Primary | ICD-10-CM

## 2020-11-30 DIAGNOSIS — R05.9 COUGH: ICD-10-CM

## 2020-11-30 PROCEDURE — U0003 INFECTIOUS AGENT DETECTION BY NUCLEIC ACID (DNA OR RNA); SEVERE ACUTE RESPIRATORY SYNDROME CORONAVIRUS 2 (SARS-COV-2) (CORONAVIRUS DISEASE [COVID-19]), AMPLIFIED PROBE TECHNIQUE, MAKING USE OF HIGH THROUGHPUT TECHNOLOGIES AS DESCRIBED BY CMS-2020-01-R: HCPCS

## 2020-11-30 NOTE — TELEPHONE ENCOUNTER
Thanksgiving her neighbor came over and she was exposed. Cough and runny nose for months. Pt would like to be tested for covid 19. Care advice recommend pt receives a call from Md within 1 hour. Please call and advise.     Reason for Disposition   HIGH RISK patient (e.g., age > 64 years, diabetes, heart or lung disease, weak immune system) (Exception: has already been evaluated by healthcare provider and has no new or worsening symptoms)    Additional Information   Negative: Severe difficulty breathing (e.g., struggling for each breath, speaks in single words)   Negative: Difficult to awaken or acting confused (e.g., disoriented, slurred speech)   Negative: Bluish (or gray) lips or face now   Negative: Shock suspected (e.g., cold/pale/clammy skin, too weak to stand, low BP, rapid pulse)   Negative: Sounds like a life-threatening emergency to the triager   Negative: SEVERE or constant chest pain or pressure (Exception: mild central chest pain, present only when coughing)   Negative: MODERATE difficulty breathing (e.g., speaks in phrases, SOB even at rest, pulse 100-120)   Negative: MILD difficulty breathing (e.g., minimal/no SOB at rest, SOB with walking, pulse <100)   Negative: Chest pain   Negative: Patient sounds very sick or weak to the triager   Negative: Fever > 103 F (39.4 C)   Negative: [1] Fever > 101 F (38.3 C) AND [2] age > 60   Negative: [1] Fever > 100.0 F (37.8 C) AND [2] bedridden (e.g., nursing home patient, CVA, chronic illness, recovering from surgery)    Protocols used: CORONAVIRUS (COVID-19) DIAGNOSED OR [id number]

## 2020-12-01 LAB — SARS-COV-2 RNA RESP QL NAA+PROBE: NOT DETECTED

## 2020-12-08 ENCOUNTER — CLINICAL SUPPORT (OUTPATIENT)
Dept: REHABILITATION | Facility: OTHER | Age: 65
End: 2020-12-08
Payer: MEDICARE

## 2020-12-08 DIAGNOSIS — R29.898 IMPAIRED STRENGTH OF SHOULDER MUSCLES: ICD-10-CM

## 2020-12-08 DIAGNOSIS — R29.3 POSTURE ABNORMALITY: ICD-10-CM

## 2020-12-08 DIAGNOSIS — M25.512 ACUTE PAIN OF LEFT SHOULDER: ICD-10-CM

## 2020-12-08 DIAGNOSIS — M25.612 DECREASED RANGE OF MOTION OF LEFT SHOULDER: ICD-10-CM

## 2020-12-08 PROCEDURE — 97110 THERAPEUTIC EXERCISES: CPT | Mod: PN,CQ

## 2020-12-08 NOTE — PROGRESS NOTES
Physical Therapy Treatment Note     Name: Jyoti Eubanks sean  Clinic Number: [id number]    Therapy Diagnosis:   Encounter Diagnoses   Name Primary?    Decreased range of motion of left shoulder     Impaired strength of shoulder muscles     Acute pain of left shoulder     Posture abnormality      Physician: Judy Eaton MD    Visit Date: 12/8/2020    Physician Orders: PT Eval and Treat  Medical Diagnosis from Referral: Shoulder pain, unspecified chronicity, unspecified laterality (M25.519)  Evaluation Date: 11/23/2020  Authorization Period Expiration: 11/09/2021  Plan of Care Expiration: 03/12/2021  Visit # / Visits authorized: 2 (1/ 20)     Time In: 1730  Time Out: 1818  Total Billable Time: 48 minutes     Precautions: Standard       Subjective     Pt reports: w/ mild pn in lateral L shld  She was compliant with home exercise program.  Response to previous treatment: no adverse effects  Functional change: no change     Pain: 3/10  Location: left shoulder      Objective     Jyoti received therapeutic exercises to develop strength, endurance, ROM, flexibility, posture and core stabilization for 48 minutes including:  Scap retractions 10 x 5 sec hold w/ towel roll seated in chair   Upper Trap stretch 3 x 30 sec   Levator Scap stretch 3 x 30 sec   Chin tuck seated 2 x 10   Pulley flexion and scaption 3 min ea  SHld ext 2 x 10 otb   Seated march w/ TA activation for posture 20 x B        Home Exercises Provided and Patient Education Provided     Education provided:   - Pt edu on proper exercise technique.      Written Home Exercises Provided: Patient instructed to cont prior HEP.  Exercises were reviewed and Jyoti was able to demonstrate them prior to the end of the session.  Jyoti demonstrated good  understanding of the education provided.     See EMR under Patient Instructions for exercises provided prior visit.    Assessment     Pt joshua tx well.  AROM improved after tx but was not measured.  Pt cont to lack  some scap control.  Pt displayed good effort during therex.  Pt needed many VCs to avoid UT activation.    Jyoti is progressing well towards her goals.   Pt prognosis is Good.     Pt will continue to benefit from skilled outpatient physical therapy to address the deficits listed in the problem list box on initial evaluation, provide pt/family education and to maximize pt's level of independence in the home and community environment.     Pt's spiritual, cultural and educational needs considered and pt agreeable to plan of care and goals.     Anticipated barriers to physical therapy: none    Goals: Goals:     In 6 weeks,    Goal Status   1. Patient will be independent with HEP to promote improved therapy outcomes.  STG  progressing, not met   2. Pt will increase pain free L shoulder abduction PROM to >/= 160 deg to improve functional mobility of UE STG  progressing, not met   3. Pt will increase L shoulder AROM by 10 degrees in flex/ext/abd to improve  STG  progressing, not met   4. Patient will require min VC from PT for proper scapular retraction in order to improve postural awareness. STG  progressing, not met         In 12 weeks,   Goal Status   5. Patient will be independent with progressed HEP to self manage symptoms.  LTG  progressing, not met   6. Pt will increase L shoulder AROM so that R=L to improve functional use of L UE. LTG  progressing, not met   7. Pt will improve L shoulder MMTs to = 4/5 to promote equal use of B UEs in performing functional tasks. LTG  progressing, not met   8. Pt will lift 5 lb objects from counter to overhead without pain to put dishes away    progressing, not met   9. Pt to report pain </= 2/10 with ADLs and IADLs using L UE to get dressed and perform household chores easier.     progressing, not met   10. Patient will improve FOTO to </= 34% limitation to demonstrate improved functional mobility.     progressing, not met             Plan     Cont to progress towards goals set by  PT.  Work to increase scap control and ROM next visit.      Julio C Durant, PTA

## 2020-12-10 NOTE — PROGRESS NOTES
Physical Therapy Treatment Note     Name: Jyoti Eubanks [facility] Number: [id number]    Therapy Diagnosis:   Encounter Diagnoses   Name Primary?    Decreased range of motion of left shoulder     Impaired strength of shoulder muscles     Acute pain of left shoulder     Posture abnormality      Physician: Judy Eaton MD    Visit Date: 12/11/2020    Physician Orders: PT Eval and Treat  Medical Diagnosis from Referral: Shoulder pain, unspecified chronicity, unspecified laterality (M25.519)  Evaluation Date: 11/23/2020  Authorization Period Expiration: 11/09/2021  Plan of Care Expiration: 03/12/2021  Visit # / Visits authorized: 2/ 20 (3 visits total)     Time In: 3:38  pm  Time Out: 4:38 pm  Total Billable Time: 60 minutes     Precautions: Standard       Subjective     Pt reports: her shoulder continues to bother her.  She states it feels like a bruise and pain goes down her arm.  She doesn't want to wear regular shirts anymore because it hurts too much to put them on.      She was compliant with home exercise program.  Response to previous treatment: no adverse effects  Functional change: no change     Pain: 6/10  Location: left shoulder      Objective     Jyoti received therapeutic exercises to develop strength, endurance, ROM, flexibility, posture and core stabilization for 60 minutes including:    Scap retractions 20x 5 sec hold w/ towel roll seated in chair   Upper Trap stretch 2x 30 sec   Levator Scap stretch 2x 30 sec   + No money 20x 5 second hold  + No money with elevation x20  + Cervical retraction 2x 10  Chin tuck seated 2 x 10   + Pec stretch over 1/2 foam x2 minutes  + Supine horizontal abduction over 1/2 foam 2x 10  + SL ER 2x 10  + SL Abd to tolerance 2x 10  + SA punches 2x 10  + seated TrA activation with Dowel cathleen flexion 2x 10   + Rows with OTB 2x 10  Shoulder ext 2 x 10 OTB  Pulley flexion and scaption 3 min ea    Not performed today:  Seated march w/ TA activation for posture 20 x B         Home Exercises Provided and Patient Education Provided     Education provided:   - Pt edu on proper exercise technique, scapular and posture positioning effes      Written Home Exercises Provided: yes.  Exercises were reviewed and Jyoti was able to demonstrate them prior to the end of the session.  Jyoti demonstrated good  understanding of the education provided.     See EMR under Patient Instructions for exercises provided 12/11/2020.       Assessment     Trialed pec stretch in corner but pt had difficulty maintaining position reporting 6/10 pain, denied sharp, shooting pains but stated it was hurting really badly so patient transitioned to supine over 1/2 foam where it was more tolerable.  Patient reported pain decreased as time progressed and she felt more of a stretch at the end of the 2 minutes.  Fair tolerance to exercise today with decreased scapular control evident requiring moderate cueing for scap activation and posture.  Education provided on posture and scapular positioning effects on shoulder movements and pain. Continue with strengthening and stretching as tolerated by patient.       Jyoti is progressing well towards her goals.   Pt prognosis is Good.     Pt will continue to benefit from skilled outpatient physical therapy to address the deficits listed in the problem list box on initial evaluation, provide pt/family education and to maximize pt's level of independence in the home and community environment.     Pt's spiritual, cultural and educational needs considered and pt agreeable to plan of care and goals.     Anticipated barriers to physical therapy: none    Goals: Goals:     In 6 weeks,    Goal Status   1. Patient will be independent with HEP to promote improved therapy outcomes.  STG  progressing, not met   2. Pt will increase pain free L shoulder abduction PROM to >/= 160 deg to improve functional mobility of UE STG  progressing, not met   3. Pt will increase L shoulder AROM by 10 degrees  in flex/ext/abd to improve  STG  progressing, not met   4. Patient will require min VC from PT for proper scapular retraction in order to improve postural awareness. STG  progressing, not met         In 12 weeks,   Goal Status   5. Patient will be independent with progressed HEP to self manage symptoms.  LTG  progressing, not met   6. Pt will increase L shoulder AROM so that R=L to improve functional use of L UE. LTG  progressing, not met   7. Pt will improve L shoulder MMTs to = 4/5 to promote equal use of B UEs in performing functional tasks. LTG  progressing, not met   8. Pt will lift 5 lb objects from counter to overhead without pain to put dishes away LTG   progressing, not met   9. Pt to report pain </= 2/10 with ADLs and IADLs using L UE to get dressed and perform household chores easier.   LTG  progressing, not met   10. Patient will improve FOTO to </= 34% limitation to demonstrate improved functional mobility.   LTG  progressing, not met             Plan     Cont to progress towards goals set by PT.  Work to increase scap control and ROM next visit.      Karyn Fiore, PT

## 2020-12-11 ENCOUNTER — CLINICAL SUPPORT (OUTPATIENT)
Dept: REHABILITATION | Facility: OTHER | Age: 65
End: 2020-12-11
Payer: MEDICARE

## 2020-12-11 DIAGNOSIS — R29.898 IMPAIRED STRENGTH OF SHOULDER MUSCLES: ICD-10-CM

## 2020-12-11 DIAGNOSIS — M25.512 ACUTE PAIN OF LEFT SHOULDER: ICD-10-CM

## 2020-12-11 DIAGNOSIS — M25.612 DECREASED RANGE OF MOTION OF LEFT SHOULDER: ICD-10-CM

## 2020-12-11 DIAGNOSIS — R29.3 POSTURE ABNORMALITY: ICD-10-CM

## 2020-12-11 PROCEDURE — 97110 THERAPEUTIC EXERCISES: CPT | Mod: PN

## 2020-12-11 NOTE — PROGRESS NOTES
Digital Medicine: Health  Follow-Up    The history is provided by the patient.                 Patient needs assistance troubleshooting: tech support needed.    Additional Follow-up details: Patient reports that she is doing well. Patient has not submitted a reading since 08/2020. Patient states that she is having technical difficulties with the cuff connecting to the device. Will place CRM. Also sent BP connection guide resource. Patient reports no s/s or issues taking BP medication as prescribed. Will f/u in 4 weeks.             Diet-Not assessed          Physical Activity-Not assessed    Medication Adherence-Medication adherence was assessed.      Substance, Sleep, Stress-Not assessed      Additional monitoring needed.  Instructed to charge device.  Tech support needed. Placed CRM task       Addressed patient questions and patient has my contact information if needed prior to next outreach. Patient verbalizes understanding.      Explained the importance of self-monitoring and medication adherence. Encouraged the patient to communicate with their health  for lifestyle modifications to help improve or maintain a healthy lifestyle.               There are no preventive care reminders to display for this patient.      Last 5 Patient Entered Readings                                      Current 30 Day Average:      Recent Readings 8/20/2020 8/20/2020 8/20/2020 8/10/2020 8/10/2020    SBP (mmHg) 133 135 125 136 131    DBP (mmHg) 67 68 73 71 68    Pulse 85 87 85 80 82

## 2020-12-11 NOTE — PATIENT INSTRUCTIONS
Access Code: [email or web address]   URL: [email or web address]   Date: 12/11/2020   Prepared by: Karyn Fiore     Exercises  Seated Scapular Retraction - 20 reps - 1 sets - 5 second hold - 1x daily  Seated Upper Trapezius Stretch - 3 reps - 1 sets - 30 hold - 1x daily  Seated Levator Scapulae Stretch - 3 reps - 1 sets - 30 hold - 1x daily  Supine Chin Tuck - 20 reps - 1 sets - 5 hold - 1x daily  Shoulder External Rotation and Scapular Retraction - 10 reps - 2 sets - 5 hold - 1x daily  Seated Cervical Retraction - 20 reps - 1 sets - 3 hold - 1x daily  Supine Cervical Retraction with Towel - 20 reps - 1 sets - 3 seconds hold - 1x daily  Doorway Pec Stretch at 90 Degrees Abduction - 3 reps - 1 sets - 30 seconds hold - 1x daily  Corner Pec Major Stretch - 3 reps - 1 sets - 30 hold - 1x daily  Supine Shoulder Horizontal Abduction with Resistance - 10 reps - 3 sets - 1x daily  Supine Scapular Protraction in Flexion with Dumbbells - 10 reps - 2 sets - 3 hold - 1x daily

## 2020-12-15 ENCOUNTER — CLINICAL SUPPORT (OUTPATIENT)
Dept: REHABILITATION | Facility: OTHER | Age: 65
End: 2020-12-15
Payer: MEDICARE

## 2020-12-15 ENCOUNTER — PATIENT MESSAGE (OUTPATIENT)
Dept: ORTHOPEDICS | Facility: CLINIC | Age: 65
End: 2020-12-15

## 2020-12-15 DIAGNOSIS — M25.612 DECREASED RANGE OF MOTION OF LEFT SHOULDER: ICD-10-CM

## 2020-12-15 DIAGNOSIS — R29.3 POSTURE ABNORMALITY: ICD-10-CM

## 2020-12-15 DIAGNOSIS — R29.898 IMPAIRED STRENGTH OF SHOULDER MUSCLES: ICD-10-CM

## 2020-12-15 DIAGNOSIS — M25.512 ACUTE PAIN OF LEFT SHOULDER: ICD-10-CM

## 2020-12-15 PROCEDURE — 97110 THERAPEUTIC EXERCISES: CPT | Mod: PN,CQ

## 2020-12-15 NOTE — PROGRESS NOTES
Physical Therapy Treatment Note     Name: Jyoti Eubanks sean  Clinic Number: [id number]    Therapy Diagnosis:   No diagnosis found.  Physician: Judy Eaton MD    Visit Date: 12/15/2020    Physician Orders: PT Eval and Treat  Medical Diagnosis from Referral: Shoulder pain, unspecified chronicity, unspecified laterality (M25.519)  Evaluation Date: 11/23/2020  Authorization Period Expiration: 11/09/2021  Plan of Care Expiration: 03/12/2021  Visit # / Visits authorized: 3/ 20 (4 visits total)     Time In: 1600   Time Out: 1645  Total Billable Time:45  minutes     Precautions: Standard       Subjective     Pt states feeling okay but cont to have occasional radiating pn from L shld to L elbow while at rest or with movement.      She was compliant with home exercise program.  Response to previous treatment: muscle soreness  Functional change: no change     Pain: 6/10  Location: left shoulder      Objective     Jyoti received therapeutic exercises to develop strength, endurance, ROM, flexibility, posture and core stabilization for 40 minutes including:    Scap retractions 20x 5 sec hold w/ towel roll seated in chair   Upper Trap stretch 2x 30 sec   Levator Scap stretch 2x 30 sec   + No money 20x 5 second hold  + No money with elevation x20  + Cervical retraction 2x 10  Chin tuck seated 2 x 10   + Pec stretch over 1/2 foam x2 minutes  + Supine horizontal abduction over 1/2 foam 2x 10  + SL ER 30 x  + SL Abd to tolerance 2x 10  + SA punches 2x 10 3 sec hold   + seated TrA activation with Dowel cathleen flexion 2x 10   + Rows with OTB 30x   Shoulder ext 30 x  OTB  Pulley flexion and scaption 3 min ea    Not performed today:  Seated march w/ TA activation for posture 20 x B      Manual Treatment: 5 min   Cupping B upper trap one cup ea static 5 min     Home Exercises Provided and Patient Education Provided     Education provided:   - Pt edu on proper exercise technique.     Written Home Exercises Provided: yes.  Exercises were  reviewed and Jyoti was able to demonstrate them prior to the end of the session.  Jyoti demonstrated good  understanding of the education provided.     See EMR under Patient Instructions for exercises provided 12/11/2020.       Assessment   Upper trap tightness decreased after manual treatment.  Pt joshua tx well w/ no c/o pn.  Pt displayed increased muscualr endurance during therex.  Pt cont to lack some core stability, scap control, and strength.       Jyoti is progressing well towards her goals.   Pt prognosis is Good.     Pt will continue to benefit from skilled outpatient physical therapy to address the deficits listed in the problem list box on initial evaluation, provide pt/family education and to maximize pt's level of independence in the home and community environment.     Pt's spiritual, cultural and educational needs considered and pt agreeable to plan of care and goals.     Anticipated barriers to physical therapy: none    Goals: Goals:     In 6 weeks,    Goal Status   1. Patient will be independent with HEP to promote improved therapy outcomes.  STG  progressing, not met   2. Pt will increase pain free L shoulder abduction PROM to >/= 160 deg to improve functional mobility of UE STG  progressing, not met   3. Pt will increase L shoulder AROM by 10 degrees in flex/ext/abd to improve  STG  progressing, not met   4. Patient will require min VC from PT for proper scapular retraction in order to improve postural awareness. STG  progressing, not met         In 12 weeks,   Goal Status   5. Patient will be independent with progressed HEP to self manage symptoms.  LTG  progressing, not met   6. Pt will increase L shoulder AROM so that R=L to improve functional use of L UE. LTG  progressing, not met   7. Pt will improve L shoulder MMTs to = 4/5 to promote equal use of B UEs in performing functional tasks. LTG  progressing, not met   8. Pt will lift 5 lb objects from counter to overhead without pain to put dishes  away LTG   progressing, not met   9. Pt to report pain </= 2/10 with ADLs and IADLs using L UE to get dressed and perform household chores easier.   LTG  progressing, not met   10. Patient will improve FOTO to </= 34% limitation to demonstrate improved functional mobility.   LTG  progressing, not met             Plan     Cont to progress towards goals set by PT.  Cont to improve scap control and ROM next visit.      Julio C Durant, PTA

## 2020-12-16 ENCOUNTER — IMMUNIZATION (OUTPATIENT)
Dept: PHARMACY | Facility: CLINIC | Age: 65
End: 2020-12-16
Payer: MEDICARE

## 2020-12-16 DIAGNOSIS — M25.512 LEFT SHOULDER PAIN, UNSPECIFIED CHRONICITY: Primary | ICD-10-CM

## 2020-12-16 DIAGNOSIS — M25.512 LEFT SHOULDER PAIN, UNSPECIFIED CHRONICITY: ICD-10-CM

## 2020-12-16 RX ORDER — NAPROXEN 500 MG/1
500 TABLET ORAL 2 TIMES DAILY WITH MEALS
Qty: 60 TABLET | Refills: 0 | Status: SHIPPED | OUTPATIENT
Start: 2020-12-16 | End: 2021-02-09 | Stop reason: SDUPTHER

## 2020-12-16 RX ORDER — NAPROXEN 500 MG/1
500 TABLET ORAL 2 TIMES DAILY WITH MEALS
Qty: 60 TABLET | Refills: 0 | Status: SHIPPED | OUTPATIENT
Start: 2020-12-16 | End: 2020-12-16

## 2020-12-18 ENCOUNTER — CLINICAL SUPPORT (OUTPATIENT)
Dept: REHABILITATION | Facility: OTHER | Age: 65
End: 2020-12-18
Payer: MEDICARE

## 2020-12-18 DIAGNOSIS — M25.612 DECREASED RANGE OF MOTION OF LEFT SHOULDER: ICD-10-CM

## 2020-12-18 DIAGNOSIS — R29.3 POSTURE ABNORMALITY: ICD-10-CM

## 2020-12-18 DIAGNOSIS — M25.512 ACUTE PAIN OF LEFT SHOULDER: ICD-10-CM

## 2020-12-18 DIAGNOSIS — R29.898 IMPAIRED STRENGTH OF SHOULDER MUSCLES: ICD-10-CM

## 2020-12-18 PROCEDURE — 97140 MANUAL THERAPY 1/> REGIONS: CPT | Mod: PN

## 2020-12-18 PROCEDURE — 97110 THERAPEUTIC EXERCISES: CPT | Mod: PN

## 2020-12-18 NOTE — PROGRESS NOTES
"  Physical Therapy Treatment Note     Name: Jyoti Eubanks [facility]  Clinic Number: [id number]    Therapy Diagnosis:   Encounter Diagnoses   Name Primary?    Decreased range of motion of left shoulder     Impaired strength of shoulder muscles     Acute pain of left shoulder     Posture abnormality      Physician: Judy Eaton MD    Visit Date: 12/18/2020    Physician Orders: PT Eval and Treat  Medical Diagnosis from Referral: Shoulder pain, unspecified chronicity, unspecified laterality (M25.519)  Evaluation Date: 11/23/2020  Authorization Period Expiration: 11/09/2021  Plan of Care Expiration: 03/12/2021  Visit # / Visits authorized: 4/ 20 (4 visits total)     Time In: 1600   Time Out: 1645  Total Billable Time:45  minutes     Precautions: Standard       Subjective     "it's hurting today". She states that she feels better when she leaves but the pain always returns    She was compliant with home exercise program.  Response to previous treatment: muscle soreness  Functional change: no change     Pain: 6/10  Location: left shoulder      Objective   L Shoulder active flexion: Pre treatment 110 degrees, Post treatment 120     Jyoti received therapeutic exercises to develop strength, endurance, ROM, flexibility, posture and core stabilization for 25 minutes including:    Scap retractions 20x 5 sec hold w/ towel roll seated in chair   Upper Trap stretch 2x 30 sec   Levator Scap stretch 2x 30 sec   + No money 20x 5 second hold  + No money with elevation x20  + Cervical retraction 2x 10  Chin tuck seated 2 x 10   + Pec stretch over 1/2 foam x2 minutes  + Supine horizontal abduction over 1/2 foam 2x 10  + SL ER 30 x  + SL Abd to tolerance 2x 10  + SA punches 2x 10 3 sec hold   + seated TrA activation with Dowel cathleen flexion 2x 10   + Rows with OTB 30x   Shoulder ext 30 x  OTB  UBE - 4 mins forward   Neva scaption 4 min ea  Prayer stretch for shoulder flexion - 10x5''   Open books - 3x10       Not performed " today:  Seated march w/ TA activation for posture 20 x B      Manual Treatment: 15 min   Cupping B upper trap one cup ea static 5 min   Post/inf shoulder mobs Grades III-IV  Pec minor and subscap release  STM to lats and teres     Home Exercises Provided and Patient Education Provided     Education provided:   - Pt edu on proper exercise technique.     Written Home Exercises Provided: yes.  Exercises were reviewed and Jyoti was able to demonstrate them prior to the end of the session.  Jyoti demonstrated good  understanding of the education provided.     See EMR under Patient Instructions for exercises provided 12/11/2020.       Assessment   Pt was with same amounts of pain and ROM upon arrival today. Added glenohumeral joint mobs to the program today as well as soft tissue work to improve motion. 10 degrees improvement in shoulder flexion after the treatment. Exercise was focused on finding a shoulder overhead stretch that she can replicate at home with small amounts of pain. She liked the pulleys and she was educated on where she can purchase a set. We also discussed trying PT for a few more weeks before we refer her back to MD for a possible injection. Finally, she questioned the effectiveness of a posture corrective shirt and she was educated that it could potentially be helpful due to the nature of her job requiring so much sitting.       Jyoti is progressing well towards her goals.   Pt prognosis is Good.     Pt will continue to benefit from skilled outpatient physical therapy to address the deficits listed in the problem list box on initial evaluation, provide pt/family education and to maximize pt's level of independence in the home and community environment.     Pt's spiritual, cultural and educational needs considered and pt agreeable to plan of care and goals.     Anticipated barriers to physical therapy: none    Goals: Goals:     In 6 weeks,    Goal Status   1. Patient will be independent with HEP to  promote improved therapy outcomes.  STG  progressing, not met   2. Pt will increase pain free L shoulder abduction PROM to >/= 160 deg to improve functional mobility of UE STG  progressing, not met   3. Pt will increase L shoulder AROM by 10 degrees in flex/ext/abd to improve  STG  progressing, not met   4. Patient will require min VC from PT for proper scapular retraction in order to improve postural awareness. STG  progressing, not met         In 12 weeks,   Goal Status   5. Patient will be independent with progressed HEP to self manage symptoms.  LTG  progressing, not met   6. Pt will increase L shoulder AROM so that R=L to improve functional use of L UE. LTG  progressing, not met   7. Pt will improve L shoulder MMTs to = 4/5 to promote equal use of B UEs in performing functional tasks. LTG  progressing, not met   8. Pt will lift 5 lb objects from counter to overhead without pain to put dishes away LTG   progressing, not met   9. Pt to report pain </= 2/10 with ADLs and IADLs using L UE to get dressed and perform household chores easier.   LTG  progressing, not met   10. Patient will improve FOTO to </= 34% limitation to demonstrate improved functional mobility.   LTG  progressing, not met             Plan     Progress shoulder mobility      Anthony Evans, PT

## 2020-12-21 ENCOUNTER — CLINICAL SUPPORT (OUTPATIENT)
Dept: REHABILITATION | Facility: OTHER | Age: 65
End: 2020-12-21
Payer: MEDICARE

## 2020-12-21 DIAGNOSIS — R29.898 IMPAIRED STRENGTH OF SHOULDER MUSCLES: ICD-10-CM

## 2020-12-21 DIAGNOSIS — M25.612 DECREASED RANGE OF MOTION OF LEFT SHOULDER: Primary | ICD-10-CM

## 2020-12-21 DIAGNOSIS — R29.3 POSTURE ABNORMALITY: ICD-10-CM

## 2020-12-21 DIAGNOSIS — M25.512 ACUTE PAIN OF LEFT SHOULDER: ICD-10-CM

## 2020-12-21 PROCEDURE — 97110 THERAPEUTIC EXERCISES: CPT | Mod: PN

## 2020-12-21 PROCEDURE — 97140 MANUAL THERAPY 1/> REGIONS: CPT | Mod: PN

## 2020-12-21 NOTE — PROGRESS NOTES
Physical Therapy Treatment Note     Name: Jyoti Eubanks sean  Clinic Number: [id number]    Therapy Diagnosis:   Encounter Diagnoses   Name Primary?    Decreased range of motion of left shoulder Yes    Impaired strength of shoulder muscles     Acute pain of left shoulder     Posture abnormality      Physician: Judy Eaton MD    Visit Date: 12/21/2020    Physician Orders: PT Eval and Treat  Medical Diagnosis from Referral: Shoulder pain, unspecified chronicity, unspecified laterality (M25.519)  Evaluation Date: 11/23/2020  Authorization Period Expiration: 11/09/2021  Plan of Care Expiration: 03/12/2021  Visit # / Visits authorized: 5/ 20     Time In: 1:15  Time Out: 2:00  Total Billable Time:45  minutes     Precautions: Standard       Subjective     Pt states that she feels better when she leaves but the pain always returns    She was compliant with home exercise program.  Response to previous treatment: muscle soreness  Functional change: no change     Pain: 6/10  Location: left shoulder      Objective   L Shoulder active flexion: Pre treatment 110 degrees, Post treatment 120     Jyoti received therapeutic exercises to develop strength, endurance, ROM, flexibility, posture and core stabilization for 35 minutes including:    Scap retractions 15x 2 hold w/ towel roll seated in chair   Seated no money with 1/2 foam behind back x 15  SL ER 10 x 2  + SL horizontal Abd 2x 10  Open book x 10  No money with elevation x 15  Chin tuck seated 2 x 10   Inferior glide 10s x 10  Low row 10s x 10  YTB ER walkouts 10 x 2  TYB IR walkouts 10 x 2  Pulleys flexion x 2 min, abduction x 2 min  Prayer stretch for shoulder flexion - 10x5''   Prone Is 10 x 2  Prone Ts x 10 (significant difficulty)    Not performed today:  Seated march w/ TA activation for posture 20 x B   Upper Trap stretch 2x 30 sec   Levator Scap stretch 2x 30 sec   + Cervical retraction 2x 10  + Pec stretch over 1/2 foam x2 minutes  + Supine horizontal  abduction over 1/2 foam 2x 10  + SA punches 2x 10 3 sec hold   + seated TrA activation with Dowel cathleen flexion 2x 10   + Rows with OTB 30x   Shoulder ext 30 x  OTB  UBE - 4 mins forward      Manual Treatment: 10 min   Post/inf shoulder mobs Grades III-IV    Not Performed Today:  Pec minor and subscap release  STM to lats and teres   Cupping B upper trap one cup ea static 5 min     Home Exercises Provided and Patient Education Provided     Education provided:   - Pt edu on proper exercise technique.     Written Home Exercises Provided: yes.  Exercises were reviewed and Jyoti was able to demonstrate them prior to the end of the session.  Jyoti demonstrated good  understanding of the education provided.     See EMR under Patient Instructions for exercises provided 12/11/2020.       Assessment     Today's treatment today focused on shoulder ROM and middle/lower trap strengthening. Pt did not tolerate manual inferior glides well due to pain, but did well with isometric inferior glide exercise. PT continues to work on shoulder ROM to restore functional use of shoulder.    Jyoti is progressing well towards her goals.   Pt prognosis is Good.     Pt will continue to benefit from skilled outpatient physical therapy to address the deficits listed in the problem list box on initial evaluation, provide pt/family education and to maximize pt's level of independence in the home and community environment.     Pt's spiritual, cultural and educational needs considered and pt agreeable to plan of care and goals.     Anticipated barriers to physical therapy: none    Goals: Goals:     In 6 weeks,    Goal Status   1. Patient will be independent with HEP to promote improved therapy outcomes.  STG  progressing, not met   2. Pt will increase pain free L shoulder abduction PROM to >/= 160 deg to improve functional mobility of UE STG  progressing, not met   3. Pt will increase L shoulder AROM by 10 degrees in flex/ext/abd to improve  STG   progressing, not met   4. Patient will require min VC from PT for proper scapular retraction in order to improve postural awareness. STG  progressing, not met         In 12 weeks,   Goal Status   5. Patient will be independent with progressed HEP to self manage symptoms.  LTG  progressing, not met   6. Pt will increase L shoulder AROM so that R=L to improve functional use of L UE. LTG  progressing, not met   7. Pt will improve L shoulder MMTs to = 4/5 to promote equal use of B UEs in performing functional tasks. LTG  progressing, not met   8. Pt will lift 5 lb objects from counter to overhead without pain to put dishes away LTG   progressing, not met   9. Pt to report pain </= 2/10 with ADLs and IADLs using L UE to get dressed and perform household chores easier.   LTG  progressing, not met   10. Patient will improve FOTO to </= 34% limitation to demonstrate improved functional mobility.   LTG  progressing, not met             Plan     Provide HEP to perform hourly with work. Continue to work on ROM and postural strengthening.     Dejah Matt, PT

## 2020-12-28 ENCOUNTER — CLINICAL SUPPORT (OUTPATIENT)
Dept: REHABILITATION | Facility: OTHER | Age: 65
End: 2020-12-28
Payer: MEDICARE

## 2020-12-28 DIAGNOSIS — R29.3 POSTURE ABNORMALITY: ICD-10-CM

## 2020-12-28 DIAGNOSIS — M25.612 DECREASED RANGE OF MOTION OF LEFT SHOULDER: ICD-10-CM

## 2020-12-28 DIAGNOSIS — M25.512 ACUTE PAIN OF LEFT SHOULDER: ICD-10-CM

## 2020-12-28 DIAGNOSIS — R29.898 IMPAIRED STRENGTH OF SHOULDER MUSCLES: ICD-10-CM

## 2020-12-28 PROCEDURE — 97110 THERAPEUTIC EXERCISES: CPT | Mod: PN,CQ

## 2020-12-28 NOTE — PROGRESS NOTES
Physical Therapy Treatment Note     Name: Jyoti Eubanks [facility]  Clinic Number: [id number]    Therapy Diagnosis:   Encounter Diagnoses   Name Primary?    Decreased range of motion of left shoulder     Impaired strength of shoulder muscles     Acute pain of left shoulder     Posture abnormality      Physician: Judy Eaton MD    Visit Date: 12/28/2020    Physician Orders: PT Eval and Treat  Medical Diagnosis from Referral: Shoulder pain, unspecified chronicity, unspecified laterality (M25.519)  Evaluation Date: 11/23/2020  Authorization Period Expiration: 11/09/2021  Plan of Care Expiration: 03/12/2021  Visit # / Visits authorized: 6/ 20     Time In: 1600  Time Out: 1642  Total Billable Time:42  minutes     Precautions: Standard       Subjective     Pt reports feeling the same with 6/10 pn in L shld that increases while sleeping at night.      She was compliant with home exercise program.  Response to previous treatment: muscle soreness  Functional change: no change     Pain: 6/10  Location: left shoulder      Objective     Jyoti received therapeutic exercises to develop strength, endurance, ROM, flexibility, posture and core stabilization for 42 minutes including:    Scap retractions 30 x 3 sec hold w/ towel roll seated in chair   Seated no money with 1/2 foam behind back x 15  SL ER 10 x 2  + SL horizontal Abd 2x 10  Open book x 10  No money with elevation x 15  Chin tuck seated 2 x 10 3 sec hold   Inferior glide 10s x 10  YTB ER walkouts 15 x 2  TYB IR walkouts 15 x 2  Shld Rows 2 x 15 ytb   Pulleys flexion x 2 min, abduction x 2 min  Prayer stretch for shoulder flexion - 10x5''   Low row 10s x 10  Prone Is 10 x 2  Prone Ts x 10 (significant difficulty)    Not performed today:  Seated march w/ TA activation for posture 20 x B   Upper Trap stretch 2x 30 sec   Levator Scap stretch 2x 30 sec   + Cervical retraction 2x 10  + Pec stretch over 1/2 foam x2 minutes  + Supine horizontal abduction over 1/2 foam 2x  10  + SA punches 2x 10 3 sec hold   + seated TrA activation with Dowel cathleen flexion 2x 10   + Rows with OTB 30x   Shoulder ext 30 x  OTB  UBE - 4 mins forward      Manual Treatment: 0 min   Post/inf shoulder mobs Grades III-IV    Not Performed Today:  Pec minor and subscap release  STM to lats and teres   Cupping B upper trap one cup ea static 5 min     Home Exercises Provided and Patient Education Provided     Education provided:   - Pt edu on proper exercise technique.     Written Home Exercises Provided: yes.  Exercises were reviewed and Jyoti was able to demonstrate them prior to the end of the session.  Jyoti demonstrated good  understanding of the education provided.     See EMR under Patient Instructions for exercises provided 12/11/2020.       Assessment   Pt demonstrated increased strength and endurance during therex.  Pt cont to have some scap and shld weakness.  Pt would cont to benefit from skilled care to increase ROM and strength.      Jyoti is progressing well towards her goals.   Pt prognosis is Good.     Pt will continue to benefit from skilled outpatient physical therapy to address the deficits listed in the problem list box on initial evaluation, provide pt/family education and to maximize pt's level of independence in the home and community environment.     Pt's spiritual, cultural and educational needs considered and pt agreeable to plan of care and goals.     Anticipated barriers to physical therapy: none    Goals: Goals:     In 6 weeks,    Goal Status   1. Patient will be independent with HEP to promote improved therapy outcomes.  STG  progressing, not met   2. Pt will increase pain free L shoulder abduction PROM to >/= 160 deg to improve functional mobility of UE STG  progressing, not met   3. Pt will increase L shoulder AROM by 10 degrees in flex/ext/abd to improve  STG  progressing, not met   4. Patient will require min VC from PT for proper scapular retraction in order to improve postural  awareness. STG  progressing, not met         In 12 weeks,   Goal Status   5. Patient will be independent with progressed HEP to self manage symptoms.  LTG  progressing, not met   6. Pt will increase L shoulder AROM so that R=L to improve functional use of L UE. LTG  progressing, not met   7. Pt will improve L shoulder MMTs to = 4/5 to promote equal use of B UEs in performing functional tasks. LTG  progressing, not met   8. Pt will lift 5 lb objects from counter to overhead without pain to put dishes away LTG   progressing, not met   9. Pt to report pain </= 2/10 with ADLs and IADLs using L UE to get dressed and perform household chores easier.   LTG  progressing, not met   10. Patient will improve FOTO to </= 34% limitation to demonstrate improved functional mobility.   LTG  progressing, not met             Plan     Cont to progress towards goals set by PT.  Work to increase scap strength and ROM next visit.      Julio C Durant, PTA

## 2020-12-30 ENCOUNTER — CLINICAL SUPPORT (OUTPATIENT)
Dept: REHABILITATION | Facility: OTHER | Age: 65
End: 2020-12-30
Payer: MEDICARE

## 2020-12-30 DIAGNOSIS — R29.3 POSTURE ABNORMALITY: ICD-10-CM

## 2020-12-30 DIAGNOSIS — M25.512 ACUTE PAIN OF LEFT SHOULDER: ICD-10-CM

## 2020-12-30 DIAGNOSIS — R29.898 IMPAIRED STRENGTH OF SHOULDER MUSCLES: ICD-10-CM

## 2020-12-30 DIAGNOSIS — M25.612 DECREASED RANGE OF MOTION OF LEFT SHOULDER: Primary | ICD-10-CM

## 2020-12-30 PROCEDURE — 97140 MANUAL THERAPY 1/> REGIONS: CPT | Mod: PN

## 2020-12-30 PROCEDURE — 97530 THERAPEUTIC ACTIVITIES: CPT | Mod: PN

## 2020-12-30 PROCEDURE — 97110 THERAPEUTIC EXERCISES: CPT | Mod: PN

## 2020-12-30 NOTE — PROGRESS NOTES
Physical Therapy Treatment Note     Name: Jyoti Eubanks [facility] Number: [id number]    Therapy Diagnosis:   Encounter Diagnoses   Name Primary?    Decreased range of motion of left shoulder Yes    Impaired strength of shoulder muscles     Acute pain of left shoulder     Posture abnormality      Physician: Judy Eaton MD    Visit Date: 12/30/2020    Physician Orders: PT Eval and Treat  Medical Diagnosis from Referral: Shoulder pain, unspecified chronicity, unspecified laterality (M25.519)  Evaluation Date: 11/23/2020  Authorization Period Expiration: 11/09/2021  Plan of Care Expiration: 03/12/2021  Visit # / Visits authorized: 7/ 20     Time In: 2:32 pm  Time Out: 3:17 pm  Total Billable Time: 45  minutes     Precautions: Standard       Subjective     Pt reports she always feels looser after therapy and reports decreased symptoms radiating down into arm but does report she is now waking up from pain in the night and finds relief with propping on pillow (demonstrates loose pack position).  Patient reports she only has pain with certain movements - specifically circular movements.      She was somewhat compliant with home exercise program.  Response to previous treatment: muscle soreness  Functional change: reports less pain     Pain: 4/10  Location: left shoulder      Objective     Jyoti participated in dynamic functional therapeutic activities to improve functional performance for 15  minutes, including:  Re-assessment     Range of Motion: measured in degrees      Cervical AROM:  R sidebending: mild limitation - Tightness  L sidebending: mild limitation - Tightness       Right AROM/PROM Left AROM/PROM   Flexion 152 112 P! /160 P!    Abduction 155 100 P! /115 P!    Extension 47 40 P!    Internal Rotation T8 Iliac Crest P!    External Rotation T3 C7 P!       IR at 90 (PROM) 41 degrees with pain  ER at 90 (PROM) 70 degrees with pain    12/30/2020  Flexion: 105 degrees 6/10 pain at end range  (130 degrees  after manual work)  Abduction: 110 degrees with shoulder elevation 6/10 pain at end range  Extension: No change P1 at end range  IR: Sacrum P! At end range  ER: C7 with discomfort but not pain      Strength/MMT: ! = pain     Shoulder Right Left   Flexion 4-/5 3/5 P!    Abduction 4-/5 3/5 P!    Extension 5/5 3/5 P!    Internal Rotation 5/5 5/5   External Rotation 3+/5 3+/5   Elbow       Flexion 4/5 4/5   Extension 4/5 4/5       12/30/2020   L flexion: 4-/5  L Abd: 3/5 with P!  L Extension: 3/5 P!   L External rotation: 4/5        Special Tests:           Right Left   Empty Can Test (-) (+)   Drop Arm test (-) (-)   Subscaputlaris Lift Off (-) (+)   Hawkin's Kenndy (-) (+)   Scapular retraction test (-) (+) Increased ROM, strength and decreased pain              CMS Impairment/Limitation/Restriction for FOTO Shoulder Survey    Therapist reviewed FOTO scores for Jyoti Tyler on 12/30/2020.   FOTO documents entered into [employer] - see Media section.    Limitation Score: 35%  Category: Carrying    Current : CJ = at least 20% but < 40% impaired, limited or restricted  Goal: CJ = at least 20% but < 40% impaired, limited or restricted                Jyoti received therapeutic exercises to develop strength, endurance, ROM, flexibility, posture and core stabilization for 20 minutes including:    Pulleys flexion 3 min, Scaption  x3 min  Passive supine manual pec stretch 4x 30 seconds  PROM flexion/abduction  No money with OTB x10 and 3 second hold  + Sleeper stretch for IR with head on shoulder 3x 30 seconds on L  + SA punch with dowel and HOB elevated, shoulders at approx 90 degrees flexion x10;  Shoulders at approx 120 degrees flexion x10   + B Scaption with YTB for SA and lower trap x10      Not performed today:  Scap retractions 30 x 3 sec hold w/ towel roll seated in chair   Seated no money with 1/2 foam behind back x 15  SL ER 10 x 2  + SL horizontal Abd 2x 10  Open book x 10  No money with elevation x 15  Chin tuck  seated 2 x 10 3 sec hold   Inferior glide 10s x 10  YTB ER walkouts 15 x 2  TYB IR walkouts 15 x 2  Shld Rows 2 x 15 ytb     Prayer stretch for shoulder flexion - 10x5''   Low row 10s x 10  Prone Is 10 x 2  Prone Ts x 10 (significant difficulty)  Seated march w/ TA activation for posture 20 x B   Upper Trap stretch 2x 30 sec   Levator Scap stretch 2x 30 sec   + Cervical retraction 2x 10  + Pec stretch over 1/2 foam x2 minutes  + Supine horizontal abduction over 1/2 foam 2x 10  + SA punches 2x 10 3 sec hold   + seated TrA activation with Dowel cathleen flexion 2x 10   + Rows with OTB 30x   Shoulder ext 30 x  OTB  UBE - 4 mins forward        Jyoti received the following manual therapy techniques: Joint mobilizations and Soft tissue Mobilization were applied to the: L shoulder for 10 minutes, including:  A/P, inferior GHJ mobilizations grade III-IV  STM to sternoclavicular fibers of pecs    Not Performed Today:  Pec minor and subscap release  STM to lats and teres   Cupping B upper trap one cup ea static 5 min     Home Exercises Provided and Patient Education Provided     Education provided:   - Pt edu on proper exercise technique.     Written Home Exercises Provided: yes.  Exercises were reviewed and Jyoti was able to demonstrate them prior to the end of the session.  Jyoti demonstrated good  understanding of the education provided.     See EMR under Patient Instructions for exercises provided 12/11/2020.       Assessment     At monthly re-assessment, patient has made some progress towards goals.   Patient appears limited to progress secondary to shoulder stiffness and GHJ movement as well as pain.  After manual therapy techniques, patient improved painfree flexion ROM by over 20 degrees.  Patient continues to have scapular weakness evident by difficulty performing SA punches and B scaption with OTB.  Continue with therapy and add in inferior glide low load, long duration stretch with and without AROM as well as pec major  stretching/STM to improve ROM and decrease pain.  Continue with scapular strengthening as well with emphasis on lower trap activation.      Jyoti is progressing well towards her goals.   Pt prognosis is Good.     Pt will continue to benefit from skilled outpatient physical therapy to address the deficits listed in the problem list box on initial evaluation, provide pt/family education and to maximize pt's level of independence in the home and community environment.     Pt's spiritual, cultural and educational needs considered and pt agreeable to plan of care and goals.     Anticipated barriers to physical therapy: none    Goals: Goals:     In 6 weeks,    Goal Status   1. Patient will be independent with HEP to promote improved therapy outcomes.  STG  In progress 12/30/2020    2. Pt will increase pain free L shoulder abduction PROM to >/= 160 deg to improve functional mobility of UE STG  In progress 12/30/2020    3. Pt will increase L shoulder AROM by 10 degrees in flex/ext/abd to improve  STG  In progress 12/30/2020    4. Patient will require min VC from PT for proper scapular retraction in order to improve postural awareness. STG  In progress 12/30/2020          In 12 weeks,   Goal Status   5. Patient will be independent with progressed HEP to self manage symptoms.  LTG  In progress 12/30/2020    6. Pt will increase L shoulder AROM so that R=L to improve functional use of L UE. LTG  In progress 12/30/2020    7. Pt will improve L shoulder MMTs to = 4/5 to promote equal use of B UEs in performing functional tasks. LTG  Good progress 12/30/2020    8. Pt will lift 5 lb objects from counter to overhead without pain to put dishes away LTG   In progress 12/30/2020    9. Pt to report pain </= 2/10 with ADLs and IADLs using L UE to get dressed and perform household chores easier.   LTG  In progress 12/30/2020    10. Patient will improve FOTO to </= 34% limitation to demonstrate improved functional mobility.   LTG  In  progress 12/30/2020              Plan     Cont to progress towards goals set by PT.  Work to increase scap strength and ROM next visit.      Karyn Fiore, PT

## 2021-01-04 ENCOUNTER — CLINICAL SUPPORT (OUTPATIENT)
Dept: REHABILITATION | Facility: OTHER | Age: 66
End: 2021-01-04
Payer: MEDICARE

## 2021-01-04 DIAGNOSIS — R29.898 IMPAIRED STRENGTH OF SHOULDER MUSCLES: ICD-10-CM

## 2021-01-04 DIAGNOSIS — M25.612 DECREASED RANGE OF MOTION OF LEFT SHOULDER: ICD-10-CM

## 2021-01-04 DIAGNOSIS — R29.3 POSTURE ABNORMALITY: ICD-10-CM

## 2021-01-04 DIAGNOSIS — M25.512 ACUTE PAIN OF LEFT SHOULDER: ICD-10-CM

## 2021-01-04 PROCEDURE — 97110 THERAPEUTIC EXERCISES: CPT | Mod: PN,CQ

## 2021-01-04 PROCEDURE — 97140 MANUAL THERAPY 1/> REGIONS: CPT | Mod: PN,CQ

## 2021-01-08 ENCOUNTER — CLINICAL SUPPORT (OUTPATIENT)
Dept: REHABILITATION | Facility: OTHER | Age: 66
End: 2021-01-08
Payer: MEDICARE

## 2021-01-08 DIAGNOSIS — M25.512 ACUTE PAIN OF LEFT SHOULDER: ICD-10-CM

## 2021-01-08 DIAGNOSIS — R29.3 POSTURE ABNORMALITY: ICD-10-CM

## 2021-01-08 DIAGNOSIS — M25.612 DECREASED RANGE OF MOTION OF LEFT SHOULDER: ICD-10-CM

## 2021-01-08 DIAGNOSIS — R29.898 IMPAIRED STRENGTH OF SHOULDER MUSCLES: ICD-10-CM

## 2021-01-08 PROCEDURE — 97110 THERAPEUTIC EXERCISES: CPT | Mod: PN,CQ

## 2021-01-08 PROCEDURE — 97140 MANUAL THERAPY 1/> REGIONS: CPT | Mod: PN,CQ

## 2021-01-11 ENCOUNTER — PATIENT OUTREACH (OUTPATIENT)
Dept: ADMINISTRATIVE | Facility: OTHER | Age: 66
End: 2021-01-11

## 2021-01-12 ENCOUNTER — OFFICE VISIT (OUTPATIENT)
Dept: ORTHOPEDICS | Facility: CLINIC | Age: 66
End: 2021-01-12
Payer: MEDICARE

## 2021-01-12 VITALS — BODY MASS INDEX: 27.33 KG/M2 | HEIGHT: 64 IN | WEIGHT: 160.06 LBS

## 2021-01-12 DIAGNOSIS — M25.812 SHOULDER IMPINGEMENT, LEFT: ICD-10-CM

## 2021-01-12 DIAGNOSIS — M25.512 LEFT SHOULDER PAIN, UNSPECIFIED CHRONICITY: Primary | ICD-10-CM

## 2021-01-12 PROCEDURE — 20610 DRAIN/INJ JOINT/BURSA W/O US: CPT | Mod: LT,S$GLB,, | Performed by: PHYSICIAN ASSISTANT

## 2021-01-12 PROCEDURE — 20610 PR DRAIN/INJECT LARGE JOINT/BURSA: ICD-10-PCS | Mod: LT,S$GLB,, | Performed by: PHYSICIAN ASSISTANT

## 2021-01-12 PROCEDURE — 99999 PR PBB SHADOW E&M-EST. PATIENT-LVL III: CPT | Mod: PBBFAC,,, | Performed by: PHYSICIAN ASSISTANT

## 2021-01-12 PROCEDURE — 99999 PR PBB SHADOW E&M-EST. PATIENT-LVL III: ICD-10-PCS | Mod: PBBFAC,,, | Performed by: PHYSICIAN ASSISTANT

## 2021-01-12 PROCEDURE — 99213 OFFICE O/P EST LOW 20 MIN: CPT | Mod: PBBFAC | Performed by: PHYSICIAN ASSISTANT

## 2021-01-12 PROCEDURE — 20610 DRAIN/INJ JOINT/BURSA W/O US: CPT | Mod: PBBFAC | Performed by: PHYSICIAN ASSISTANT

## 2021-01-12 PROCEDURE — 99213 PR OFFICE/OUTPT VISIT, EST, LEVL III, 20-29 MIN: ICD-10-PCS | Mod: 25,S$GLB,, | Performed by: PHYSICIAN ASSISTANT

## 2021-01-12 PROCEDURE — 99213 OFFICE O/P EST LOW 20 MIN: CPT | Mod: 25,S$GLB,, | Performed by: PHYSICIAN ASSISTANT

## 2021-01-12 RX ORDER — METHYLPREDNISOLONE ACETATE 80 MG/ML
80 INJECTION, SUSPENSION INTRA-ARTICULAR; INTRALESIONAL; INTRAMUSCULAR; SOFT TISSUE
Status: COMPLETED | OUTPATIENT
Start: 2021-01-12 | End: 2021-01-12

## 2021-01-12 RX ADMIN — METHYLPREDNISOLONE ACETATE 80 MG: 80 INJECTION, SUSPENSION INTRALESIONAL; INTRAMUSCULAR; INTRASYNOVIAL; SOFT TISSUE at 02:01

## 2021-01-14 ENCOUNTER — CLINICAL SUPPORT (OUTPATIENT)
Dept: REHABILITATION | Facility: OTHER | Age: 66
End: 2021-01-14
Payer: MEDICARE

## 2021-01-14 DIAGNOSIS — M25.512 ACUTE PAIN OF LEFT SHOULDER: ICD-10-CM

## 2021-01-14 DIAGNOSIS — R29.898 IMPAIRED STRENGTH OF SHOULDER MUSCLES: ICD-10-CM

## 2021-01-14 DIAGNOSIS — M25.612 DECREASED RANGE OF MOTION OF LEFT SHOULDER: ICD-10-CM

## 2021-01-14 DIAGNOSIS — R29.3 POSTURE ABNORMALITY: ICD-10-CM

## 2021-01-14 PROCEDURE — 97110 THERAPEUTIC EXERCISES: CPT | Mod: PN

## 2021-01-21 ENCOUNTER — PATIENT MESSAGE (OUTPATIENT)
Dept: ORTHOPEDICS | Facility: CLINIC | Age: 66
End: 2021-01-21

## 2021-01-21 DIAGNOSIS — M25.512 LEFT SHOULDER PAIN, UNSPECIFIED CHRONICITY: Primary | ICD-10-CM

## 2021-01-22 ENCOUNTER — DOCUMENTATION ONLY (OUTPATIENT)
Dept: REHABILITATION | Facility: OTHER | Age: 66
End: 2021-01-22

## 2021-01-27 ENCOUNTER — HOSPITAL ENCOUNTER (OUTPATIENT)
Dept: RADIOLOGY | Facility: HOSPITAL | Age: 66
Discharge: HOME | End: 2021-01-27
Attending: PHYSICIAN ASSISTANT
Payer: MEDICARE

## 2021-01-27 DIAGNOSIS — M25.512 LEFT SHOULDER PAIN, UNSPECIFIED CHRONICITY: ICD-10-CM

## 2021-01-27 PROCEDURE — 73221 MRI JOINT UPR EXTREM W/O DYE: CPT | Mod: TC,LT

## 2021-01-27 PROCEDURE — 73221 MRI SHOULDER WITHOUT CONTRAST LEFT: ICD-10-PCS | Mod: 26,LT,, | Performed by: RADIOLOGY

## 2021-01-27 PROCEDURE — 73221 MRI JOINT UPR EXTREM W/O DYE: CPT | Mod: 26,LT,, | Performed by: RADIOLOGY

## 2021-01-28 ENCOUNTER — TELEPHONE (OUTPATIENT)
Dept: ORTHOPEDICS | Facility: CLINIC | Age: 66
End: 2021-01-28

## 2021-02-09 ENCOUNTER — OFFICE VISIT (OUTPATIENT)
Dept: SPORTS MEDICINE | Facility: CLINIC | Age: 66
End: 2021-02-09
Payer: MEDICARE

## 2021-02-09 VITALS
WEIGHT: 160 LBS | HEIGHT: 64 IN | BODY MASS INDEX: 27.31 KG/M2 | HEART RATE: 76 BPM | DIASTOLIC BLOOD PRESSURE: 79 MMHG | SYSTOLIC BLOOD PRESSURE: 129 MMHG

## 2021-02-09 DIAGNOSIS — M67.912 ROTATOR CUFF DYSFUNCTION, LEFT: ICD-10-CM

## 2021-02-09 DIAGNOSIS — M79.10 MYALGIA: ICD-10-CM

## 2021-02-09 DIAGNOSIS — M99.08 SOMATIC DYSFUNCTION OF RIB CAGE REGION: ICD-10-CM

## 2021-02-09 DIAGNOSIS — G89.29 CHRONIC LEFT SHOULDER PAIN: ICD-10-CM

## 2021-02-09 DIAGNOSIS — M25.512 CHRONIC LEFT SHOULDER PAIN: ICD-10-CM

## 2021-02-09 DIAGNOSIS — M99.07 UPPER EXTREMITY SOMATIC DYSFUNCTION: ICD-10-CM

## 2021-02-09 DIAGNOSIS — M99.00 SOMATIC DYSFUNCTION OF HEAD REGION: ICD-10-CM

## 2021-02-09 DIAGNOSIS — M99.02 SOMATIC DYSFUNCTION OF THORACIC REGION: ICD-10-CM

## 2021-02-09 DIAGNOSIS — R29.3 POOR POSTURE: ICD-10-CM

## 2021-02-09 DIAGNOSIS — M99.01 CERVICAL (NECK) REGION SOMATIC DYSFUNCTION: ICD-10-CM

## 2021-02-09 DIAGNOSIS — M75.112 NONTRAUMATIC INCOMPLETE TEAR OF LEFT ROTATOR CUFF: Primary | ICD-10-CM

## 2021-02-09 DIAGNOSIS — M75.42 ROTATOR CUFF IMPINGEMENT SYNDROME OF LEFT SHOULDER: ICD-10-CM

## 2021-02-09 PROCEDURE — 99999 PR PBB SHADOW E&M-EST. PATIENT-LVL III: ICD-10-PCS | Mod: PBBFAC,,, | Performed by: NEUROMUSCULOSKELETAL MEDICINE & OMM

## 2021-02-09 PROCEDURE — 1126F PR PAIN SEVERITY QUANTIFIED, NO PAIN PRESENT: ICD-10-PCS | Mod: S$GLB,,, | Performed by: NEUROMUSCULOSKELETAL MEDICINE & OMM

## 2021-02-09 PROCEDURE — 1101F PT FALLS ASSESS-DOCD LE1/YR: CPT | Mod: CPTII,S$GLB,, | Performed by: NEUROMUSCULOSKELETAL MEDICINE & OMM

## 2021-02-09 PROCEDURE — 3074F SYST BP LT 130 MM HG: CPT | Mod: CPTII,S$GLB,, | Performed by: NEUROMUSCULOSKELETAL MEDICINE & OMM

## 2021-02-09 PROCEDURE — 3288F PR FALLS RISK ASSESSMENT DOCUMENTED: ICD-10-PCS | Mod: CPTII,S$GLB,, | Performed by: NEUROMUSCULOSKELETAL MEDICINE & OMM

## 2021-02-09 PROCEDURE — 99204 OFFICE O/P NEW MOD 45 MIN: CPT | Mod: 25,S$GLB,, | Performed by: NEUROMUSCULOSKELETAL MEDICINE & OMM

## 2021-02-09 PROCEDURE — 3074F PR MOST RECENT SYSTOLIC BLOOD PRESSURE < 130 MM HG: ICD-10-PCS | Mod: CPTII,S$GLB,, | Performed by: NEUROMUSCULOSKELETAL MEDICINE & OMM

## 2021-02-09 PROCEDURE — 1159F MED LIST DOCD IN RCRD: CPT | Mod: S$GLB,,, | Performed by: NEUROMUSCULOSKELETAL MEDICINE & OMM

## 2021-02-09 PROCEDURE — 97110 THERAPEUTIC EXERCISES: CPT | Mod: GP,S$GLB,, | Performed by: NEUROMUSCULOSKELETAL MEDICINE & OMM

## 2021-02-09 PROCEDURE — 1126F AMNT PAIN NOTED NONE PRSNT: CPT | Mod: S$GLB,,, | Performed by: NEUROMUSCULOSKELETAL MEDICINE & OMM

## 2021-02-09 PROCEDURE — 1159F PR MEDICATION LIST DOCUMENTED IN MEDICAL RECORD: ICD-10-PCS | Mod: S$GLB,,, | Performed by: NEUROMUSCULOSKELETAL MEDICINE & OMM

## 2021-02-09 PROCEDURE — 97110 PR THERAPEUTIC EXERCISES: ICD-10-PCS | Mod: GP,S$GLB,, | Performed by: NEUROMUSCULOSKELETAL MEDICINE & OMM

## 2021-02-09 PROCEDURE — 3288F FALL RISK ASSESSMENT DOCD: CPT | Mod: CPTII,S$GLB,, | Performed by: NEUROMUSCULOSKELETAL MEDICINE & OMM

## 2021-02-09 PROCEDURE — 99204 PR OFFICE/OUTPT VISIT, NEW, LEVL IV, 45-59 MIN: ICD-10-PCS | Mod: 25,S$GLB,, | Performed by: NEUROMUSCULOSKELETAL MEDICINE & OMM

## 2021-02-09 PROCEDURE — 3078F PR MOST RECENT DIASTOLIC BLOOD PRESSURE < 80 MM HG: ICD-10-PCS | Mod: CPTII,S$GLB,, | Performed by: NEUROMUSCULOSKELETAL MEDICINE & OMM

## 2021-02-09 PROCEDURE — 3008F BODY MASS INDEX DOCD: CPT | Mod: CPTII,S$GLB,, | Performed by: NEUROMUSCULOSKELETAL MEDICINE & OMM

## 2021-02-09 PROCEDURE — 99999 PR PBB SHADOW E&M-EST. PATIENT-LVL III: CPT | Mod: PBBFAC,,, | Performed by: NEUROMUSCULOSKELETAL MEDICINE & OMM

## 2021-02-09 PROCEDURE — 1101F PR PT FALLS ASSESS DOC 0-1 FALLS W/OUT INJ PAST YR: ICD-10-PCS | Mod: CPTII,S$GLB,, | Performed by: NEUROMUSCULOSKELETAL MEDICINE & OMM

## 2021-02-09 PROCEDURE — 3078F DIAST BP <80 MM HG: CPT | Mod: CPTII,S$GLB,, | Performed by: NEUROMUSCULOSKELETAL MEDICINE & OMM

## 2021-02-09 PROCEDURE — 3008F PR BODY MASS INDEX (BMI) DOCUMENTED: ICD-10-PCS | Mod: CPTII,S$GLB,, | Performed by: NEUROMUSCULOSKELETAL MEDICINE & OMM

## 2021-02-09 PROCEDURE — 98927 OSTEOPATH MANJ 5-6 REGIONS: CPT | Mod: S$GLB,,, | Performed by: NEUROMUSCULOSKELETAL MEDICINE & OMM

## 2021-02-09 PROCEDURE — 98927 PR OSTEOPATHIC MANIP,5-6 BODY REGN: ICD-10-PCS | Mod: S$GLB,,, | Performed by: NEUROMUSCULOSKELETAL MEDICINE & OMM

## 2021-02-09 RX ORDER — NAPROXEN 500 MG/1
500 TABLET ORAL 2 TIMES DAILY WITH MEALS
Qty: 60 TABLET | Refills: 0 | Status: SHIPPED | OUTPATIENT
Start: 2021-02-09 | End: 2021-09-08

## 2021-02-11 DIAGNOSIS — Z12.11 SPECIAL SCREENING FOR MALIGNANT NEOPLASMS, COLON: Primary | ICD-10-CM

## 2021-02-18 ENCOUNTER — CLINICAL SUPPORT (OUTPATIENT)
Dept: REHABILITATION | Facility: OTHER | Age: 66
End: 2021-02-18
Payer: MEDICARE

## 2021-02-18 DIAGNOSIS — M25.512 ACUTE PAIN OF LEFT SHOULDER: ICD-10-CM

## 2021-02-18 DIAGNOSIS — M25.612 DECREASED RANGE OF MOTION OF LEFT SHOULDER: ICD-10-CM

## 2021-02-18 DIAGNOSIS — R29.898 IMPAIRED STRENGTH OF SHOULDER MUSCLES: ICD-10-CM

## 2021-02-18 DIAGNOSIS — R29.3 POSTURE ABNORMALITY: ICD-10-CM

## 2021-02-18 PROCEDURE — 97140 MANUAL THERAPY 1/> REGIONS: CPT | Mod: PN

## 2021-02-18 PROCEDURE — 97110 THERAPEUTIC EXERCISES: CPT | Mod: PN

## 2021-02-18 PROCEDURE — 97530 THERAPEUTIC ACTIVITIES: CPT | Mod: PN

## 2021-03-03 ENCOUNTER — CLINICAL SUPPORT (OUTPATIENT)
Dept: REHABILITATION | Facility: OTHER | Age: 66
End: 2021-03-03
Payer: MEDICARE

## 2021-03-03 DIAGNOSIS — M25.512 ACUTE PAIN OF LEFT SHOULDER: ICD-10-CM

## 2021-03-03 DIAGNOSIS — R29.898 IMPAIRED STRENGTH OF SHOULDER MUSCLES: ICD-10-CM

## 2021-03-03 DIAGNOSIS — R29.3 POSTURE ABNORMALITY: ICD-10-CM

## 2021-03-03 DIAGNOSIS — M25.612 DECREASED RANGE OF MOTION OF LEFT SHOULDER: ICD-10-CM

## 2021-03-03 PROCEDURE — 97110 THERAPEUTIC EXERCISES: CPT | Mod: PN

## 2021-03-03 PROCEDURE — 97140 MANUAL THERAPY 1/> REGIONS: CPT | Mod: PN

## 2021-03-05 ENCOUNTER — CLINICAL SUPPORT (OUTPATIENT)
Dept: REHABILITATION | Facility: OTHER | Age: 66
End: 2021-03-05
Payer: MEDICARE

## 2021-03-05 DIAGNOSIS — M25.512 ACUTE PAIN OF LEFT SHOULDER: ICD-10-CM

## 2021-03-05 DIAGNOSIS — R29.898 IMPAIRED STRENGTH OF SHOULDER MUSCLES: ICD-10-CM

## 2021-03-05 DIAGNOSIS — M25.612 DECREASED RANGE OF MOTION OF LEFT SHOULDER: ICD-10-CM

## 2021-03-05 DIAGNOSIS — R29.3 POSTURE ABNORMALITY: ICD-10-CM

## 2021-03-05 PROCEDURE — 97110 THERAPEUTIC EXERCISES: CPT | Mod: PN,CQ

## 2021-03-05 PROCEDURE — 97140 MANUAL THERAPY 1/> REGIONS: CPT | Mod: PN,CQ

## 2021-03-08 ENCOUNTER — CLINICAL SUPPORT (OUTPATIENT)
Dept: REHABILITATION | Facility: OTHER | Age: 66
End: 2021-03-08
Payer: MEDICARE

## 2021-03-08 DIAGNOSIS — M25.512 ACUTE PAIN OF LEFT SHOULDER: ICD-10-CM

## 2021-03-08 DIAGNOSIS — M25.612 DECREASED RANGE OF MOTION OF LEFT SHOULDER: ICD-10-CM

## 2021-03-08 DIAGNOSIS — R29.898 IMPAIRED STRENGTH OF SHOULDER MUSCLES: ICD-10-CM

## 2021-03-08 DIAGNOSIS — R29.3 POSTURE ABNORMALITY: ICD-10-CM

## 2021-03-08 PROCEDURE — 97110 THERAPEUTIC EXERCISES: CPT | Mod: PN,CQ

## 2021-03-09 ENCOUNTER — OFFICE VISIT (OUTPATIENT)
Dept: SPORTS MEDICINE | Facility: CLINIC | Age: 66
End: 2021-03-09
Payer: MEDICARE

## 2021-03-09 VITALS
SYSTOLIC BLOOD PRESSURE: 126 MMHG | WEIGHT: 160 LBS | HEART RATE: 78 BPM | HEIGHT: 64 IN | DIASTOLIC BLOOD PRESSURE: 72 MMHG | BODY MASS INDEX: 27.31 KG/M2

## 2021-03-09 DIAGNOSIS — M99.02 SOMATIC DYSFUNCTION OF THORACIC REGION: ICD-10-CM

## 2021-03-09 DIAGNOSIS — R29.3 POOR POSTURE: ICD-10-CM

## 2021-03-09 DIAGNOSIS — M99.00 SOMATIC DYSFUNCTION OF HEAD REGION: ICD-10-CM

## 2021-03-09 DIAGNOSIS — M75.112 NONTRAUMATIC INCOMPLETE TEAR OF LEFT ROTATOR CUFF: Primary | ICD-10-CM

## 2021-03-09 DIAGNOSIS — M25.512 CHRONIC LEFT SHOULDER PAIN: ICD-10-CM

## 2021-03-09 DIAGNOSIS — M67.912 ROTATOR CUFF DYSFUNCTION, LEFT: ICD-10-CM

## 2021-03-09 DIAGNOSIS — M99.01 CERVICAL (NECK) REGION SOMATIC DYSFUNCTION: ICD-10-CM

## 2021-03-09 DIAGNOSIS — M99.08 SOMATIC DYSFUNCTION OF RIB CAGE REGION: ICD-10-CM

## 2021-03-09 DIAGNOSIS — M79.10 MYALGIA: ICD-10-CM

## 2021-03-09 DIAGNOSIS — M75.42 ROTATOR CUFF IMPINGEMENT SYNDROME OF LEFT SHOULDER: ICD-10-CM

## 2021-03-09 DIAGNOSIS — M99.03 SOMATIC DYSFUNCTION OF LUMBAR REGION: ICD-10-CM

## 2021-03-09 DIAGNOSIS — Z12.11 SPECIAL SCREENING FOR MALIGNANT NEOPLASM OF COLON: Primary | ICD-10-CM

## 2021-03-09 DIAGNOSIS — G89.29 CHRONIC LEFT SHOULDER PAIN: ICD-10-CM

## 2021-03-09 PROCEDURE — 97110 PR THERAPEUTIC EXERCISES: ICD-10-PCS | Mod: GP,S$GLB,, | Performed by: NEUROMUSCULOSKELETAL MEDICINE & OMM

## 2021-03-09 PROCEDURE — 98927 PR OSTEOPATHIC MANIP,5-6 BODY REGN: ICD-10-PCS | Mod: S$GLB,,, | Performed by: NEUROMUSCULOSKELETAL MEDICINE & OMM

## 2021-03-09 PROCEDURE — 1126F AMNT PAIN NOTED NONE PRSNT: CPT | Mod: S$GLB,,, | Performed by: NEUROMUSCULOSKELETAL MEDICINE & OMM

## 2021-03-09 PROCEDURE — 99999 PR PBB SHADOW E&M-EST. PATIENT-LVL III: CPT | Mod: PBBFAC,,, | Performed by: NEUROMUSCULOSKELETAL MEDICINE & OMM

## 2021-03-09 PROCEDURE — 98927 OSTEOPATH MANJ 5-6 REGIONS: CPT | Mod: S$GLB,,, | Performed by: NEUROMUSCULOSKELETAL MEDICINE & OMM

## 2021-03-09 PROCEDURE — 1126F PR PAIN SEVERITY QUANTIFIED, NO PAIN PRESENT: ICD-10-PCS | Mod: S$GLB,,, | Performed by: NEUROMUSCULOSKELETAL MEDICINE & OMM

## 2021-03-09 PROCEDURE — 3074F SYST BP LT 130 MM HG: CPT | Mod: CPTII,S$GLB,, | Performed by: NEUROMUSCULOSKELETAL MEDICINE & OMM

## 2021-03-09 PROCEDURE — 97110 THERAPEUTIC EXERCISES: CPT | Mod: GP,S$GLB,, | Performed by: NEUROMUSCULOSKELETAL MEDICINE & OMM

## 2021-03-09 PROCEDURE — 3074F PR MOST RECENT SYSTOLIC BLOOD PRESSURE < 130 MM HG: ICD-10-PCS | Mod: CPTII,S$GLB,, | Performed by: NEUROMUSCULOSKELETAL MEDICINE & OMM

## 2021-03-09 PROCEDURE — 3078F DIAST BP <80 MM HG: CPT | Mod: CPTII,S$GLB,, | Performed by: NEUROMUSCULOSKELETAL MEDICINE & OMM

## 2021-03-09 PROCEDURE — 99213 OFFICE O/P EST LOW 20 MIN: CPT | Mod: 25,S$GLB,, | Performed by: NEUROMUSCULOSKELETAL MEDICINE & OMM

## 2021-03-09 PROCEDURE — 99999 PR PBB SHADOW E&M-EST. PATIENT-LVL III: ICD-10-PCS | Mod: PBBFAC,,, | Performed by: NEUROMUSCULOSKELETAL MEDICINE & OMM

## 2021-03-09 PROCEDURE — 1159F PR MEDICATION LIST DOCUMENTED IN MEDICAL RECORD: ICD-10-PCS | Mod: S$GLB,,, | Performed by: NEUROMUSCULOSKELETAL MEDICINE & OMM

## 2021-03-09 PROCEDURE — 3008F PR BODY MASS INDEX (BMI) DOCUMENTED: ICD-10-PCS | Mod: CPTII,S$GLB,, | Performed by: NEUROMUSCULOSKELETAL MEDICINE & OMM

## 2021-03-09 PROCEDURE — 3008F BODY MASS INDEX DOCD: CPT | Mod: CPTII,S$GLB,, | Performed by: NEUROMUSCULOSKELETAL MEDICINE & OMM

## 2021-03-09 PROCEDURE — 3078F PR MOST RECENT DIASTOLIC BLOOD PRESSURE < 80 MM HG: ICD-10-PCS | Mod: CPTII,S$GLB,, | Performed by: NEUROMUSCULOSKELETAL MEDICINE & OMM

## 2021-03-09 PROCEDURE — 1159F MED LIST DOCD IN RCRD: CPT | Mod: S$GLB,,, | Performed by: NEUROMUSCULOSKELETAL MEDICINE & OMM

## 2021-03-09 PROCEDURE — 99213 PR OFFICE/OUTPT VISIT, EST, LEVL III, 20-29 MIN: ICD-10-PCS | Mod: 25,S$GLB,, | Performed by: NEUROMUSCULOSKELETAL MEDICINE & OMM

## 2021-03-11 ENCOUNTER — CLINICAL SUPPORT (OUTPATIENT)
Dept: REHABILITATION | Facility: OTHER | Age: 66
End: 2021-03-11
Payer: MEDICARE

## 2021-03-11 DIAGNOSIS — R29.3 POSTURE ABNORMALITY: ICD-10-CM

## 2021-03-11 DIAGNOSIS — M25.512 ACUTE PAIN OF LEFT SHOULDER: ICD-10-CM

## 2021-03-11 DIAGNOSIS — R29.898 IMPAIRED STRENGTH OF SHOULDER MUSCLES: ICD-10-CM

## 2021-03-11 DIAGNOSIS — M25.612 DECREASED RANGE OF MOTION OF LEFT SHOULDER: ICD-10-CM

## 2021-03-11 PROCEDURE — 97110 THERAPEUTIC EXERCISES: CPT | Mod: PN

## 2021-03-11 PROCEDURE — 97140 MANUAL THERAPY 1/> REGIONS: CPT | Mod: PN

## 2021-03-16 ENCOUNTER — CLINICAL SUPPORT (OUTPATIENT)
Dept: REHABILITATION | Facility: OTHER | Age: 66
End: 2021-03-16
Payer: MEDICARE

## 2021-03-16 DIAGNOSIS — R29.3 POSTURE ABNORMALITY: ICD-10-CM

## 2021-03-16 DIAGNOSIS — R29.898 IMPAIRED STRENGTH OF SHOULDER MUSCLES: ICD-10-CM

## 2021-03-16 DIAGNOSIS — M25.512 ACUTE PAIN OF LEFT SHOULDER: ICD-10-CM

## 2021-03-16 DIAGNOSIS — M25.612 DECREASED RANGE OF MOTION OF LEFT SHOULDER: ICD-10-CM

## 2021-03-16 PROCEDURE — 97110 THERAPEUTIC EXERCISES: CPT | Mod: PN

## 2021-03-16 PROCEDURE — 97140 MANUAL THERAPY 1/> REGIONS: CPT | Mod: PN

## 2021-03-18 ENCOUNTER — CLINICAL SUPPORT (OUTPATIENT)
Dept: REHABILITATION | Facility: OTHER | Age: 66
End: 2021-03-18
Payer: MEDICARE

## 2021-03-18 DIAGNOSIS — M25.512 ACUTE PAIN OF LEFT SHOULDER: ICD-10-CM

## 2021-03-18 DIAGNOSIS — R29.898 IMPAIRED STRENGTH OF SHOULDER MUSCLES: ICD-10-CM

## 2021-03-18 DIAGNOSIS — R29.3 POSTURE ABNORMALITY: ICD-10-CM

## 2021-03-18 DIAGNOSIS — M25.612 DECREASED RANGE OF MOTION OF LEFT SHOULDER: ICD-10-CM

## 2021-03-18 PROCEDURE — 97110 THERAPEUTIC EXERCISES: CPT | Mod: PN,CQ

## 2021-03-18 PROCEDURE — 97140 MANUAL THERAPY 1/> REGIONS: CPT | Mod: PN,CQ

## 2021-03-23 ENCOUNTER — CLINICAL SUPPORT (OUTPATIENT)
Dept: REHABILITATION | Facility: OTHER | Age: 66
End: 2021-03-23
Payer: MEDICARE

## 2021-03-23 DIAGNOSIS — R29.898 IMPAIRED STRENGTH OF SHOULDER MUSCLES: ICD-10-CM

## 2021-03-23 DIAGNOSIS — R29.3 POSTURE ABNORMALITY: ICD-10-CM

## 2021-03-23 DIAGNOSIS — M25.612 DECREASED RANGE OF MOTION OF LEFT SHOULDER: ICD-10-CM

## 2021-03-23 DIAGNOSIS — M25.512 ACUTE PAIN OF LEFT SHOULDER: ICD-10-CM

## 2021-03-23 PROCEDURE — 97110 THERAPEUTIC EXERCISES: CPT | Mod: PN

## 2021-03-23 PROCEDURE — 97140 MANUAL THERAPY 1/> REGIONS: CPT | Mod: PN

## 2021-03-25 ENCOUNTER — CLINICAL SUPPORT (OUTPATIENT)
Dept: REHABILITATION | Facility: OTHER | Age: 66
End: 2021-03-25
Payer: MEDICARE

## 2021-03-25 DIAGNOSIS — R29.3 POSTURE ABNORMALITY: ICD-10-CM

## 2021-03-25 DIAGNOSIS — M25.612 DECREASED RANGE OF MOTION OF LEFT SHOULDER: ICD-10-CM

## 2021-03-25 DIAGNOSIS — M25.512 ACUTE PAIN OF LEFT SHOULDER: ICD-10-CM

## 2021-03-25 DIAGNOSIS — R29.898 IMPAIRED STRENGTH OF SHOULDER MUSCLES: ICD-10-CM

## 2021-03-25 PROCEDURE — 97140 MANUAL THERAPY 1/> REGIONS: CPT | Mod: PN

## 2021-03-25 PROCEDURE — 97110 THERAPEUTIC EXERCISES: CPT | Mod: PN

## 2021-04-06 ENCOUNTER — CLINICAL SUPPORT (OUTPATIENT)
Dept: REHABILITATION | Facility: OTHER | Age: 66
End: 2021-04-06
Payer: MEDICARE

## 2021-04-06 DIAGNOSIS — M25.512 ACUTE PAIN OF LEFT SHOULDER: ICD-10-CM

## 2021-04-06 DIAGNOSIS — R29.3 POSTURE ABNORMALITY: ICD-10-CM

## 2021-04-06 DIAGNOSIS — M25.612 DECREASED RANGE OF MOTION OF LEFT SHOULDER: ICD-10-CM

## 2021-04-06 DIAGNOSIS — R29.898 IMPAIRED STRENGTH OF SHOULDER MUSCLES: ICD-10-CM

## 2021-04-06 PROCEDURE — 97140 MANUAL THERAPY 1/> REGIONS: CPT | Mod: PN

## 2021-04-06 PROCEDURE — 97110 THERAPEUTIC EXERCISES: CPT | Mod: PN

## 2021-04-07 ENCOUNTER — PATIENT MESSAGE (OUTPATIENT)
Dept: INTERNAL MEDICINE | Facility: CLINIC | Age: 66
End: 2021-04-07

## 2021-04-08 RX ORDER — SPIRONOLACTONE 50 MG/1
100 TABLET, FILM COATED ORAL EVERY MORNING
Qty: 180 TABLET | Refills: 1 | Status: SHIPPED | OUTPATIENT
Start: 2021-04-08 | End: 2021-09-08 | Stop reason: ALTCHOICE

## 2021-04-09 ENCOUNTER — OFFICE VISIT (OUTPATIENT)
Dept: SPORTS MEDICINE | Facility: CLINIC | Age: 66
End: 2021-04-09
Payer: MEDICARE

## 2021-04-09 VITALS
SYSTOLIC BLOOD PRESSURE: 125 MMHG | HEART RATE: 89 BPM | HEIGHT: 64 IN | WEIGHT: 168 LBS | DIASTOLIC BLOOD PRESSURE: 72 MMHG | BODY MASS INDEX: 28.68 KG/M2

## 2021-04-09 DIAGNOSIS — M75.42 ROTATOR CUFF IMPINGEMENT SYNDROME OF LEFT SHOULDER: ICD-10-CM

## 2021-04-09 DIAGNOSIS — M79.10 MYALGIA: ICD-10-CM

## 2021-04-09 DIAGNOSIS — M75.112 NONTRAUMATIC INCOMPLETE TEAR OF LEFT ROTATOR CUFF: Primary | ICD-10-CM

## 2021-04-09 DIAGNOSIS — M67.912 ROTATOR CUFF DYSFUNCTION, LEFT: ICD-10-CM

## 2021-04-09 DIAGNOSIS — G89.29 CHRONIC LEFT SHOULDER PAIN: ICD-10-CM

## 2021-04-09 DIAGNOSIS — M25.512 CHRONIC LEFT SHOULDER PAIN: ICD-10-CM

## 2021-04-09 DIAGNOSIS — R29.3 POOR POSTURE: ICD-10-CM

## 2021-04-09 DIAGNOSIS — M99.01 CERVICAL (NECK) REGION SOMATIC DYSFUNCTION: ICD-10-CM

## 2021-04-09 DIAGNOSIS — M99.02 SOMATIC DYSFUNCTION OF THORACIC REGION: ICD-10-CM

## 2021-04-09 DIAGNOSIS — M99.03 SOMATIC DYSFUNCTION OF LUMBAR REGION: ICD-10-CM

## 2021-04-09 DIAGNOSIS — M99.00 SOMATIC DYSFUNCTION OF HEAD REGION: ICD-10-CM

## 2021-04-09 DIAGNOSIS — M99.08 SOMATIC DYSFUNCTION OF RIB CAGE REGION: ICD-10-CM

## 2021-04-09 DIAGNOSIS — M99.07 UPPER EXTREMITY SOMATIC DYSFUNCTION: ICD-10-CM

## 2021-04-09 PROCEDURE — 1125F PR PAIN SEVERITY QUANTIFIED, PAIN PRESENT: ICD-10-PCS | Mod: S$GLB,,, | Performed by: NEUROMUSCULOSKELETAL MEDICINE & OMM

## 2021-04-09 PROCEDURE — 1159F PR MEDICATION LIST DOCUMENTED IN MEDICAL RECORD: ICD-10-PCS | Mod: S$GLB,,, | Performed by: NEUROMUSCULOSKELETAL MEDICINE & OMM

## 2021-04-09 PROCEDURE — 3288F PR FALLS RISK ASSESSMENT DOCUMENTED: ICD-10-PCS | Mod: CPTII,S$GLB,, | Performed by: NEUROMUSCULOSKELETAL MEDICINE & OMM

## 2021-04-09 PROCEDURE — 3288F FALL RISK ASSESSMENT DOCD: CPT | Mod: CPTII,S$GLB,, | Performed by: NEUROMUSCULOSKELETAL MEDICINE & OMM

## 2021-04-09 PROCEDURE — 1101F PT FALLS ASSESS-DOCD LE1/YR: CPT | Mod: CPTII,S$GLB,, | Performed by: NEUROMUSCULOSKELETAL MEDICINE & OMM

## 2021-04-09 PROCEDURE — 1159F MED LIST DOCD IN RCRD: CPT | Mod: S$GLB,,, | Performed by: NEUROMUSCULOSKELETAL MEDICINE & OMM

## 2021-04-09 PROCEDURE — 99999 PR PBB SHADOW E&M-EST. PATIENT-LVL III: CPT | Mod: PBBFAC,,, | Performed by: NEUROMUSCULOSKELETAL MEDICINE & OMM

## 2021-04-09 PROCEDURE — 3008F BODY MASS INDEX DOCD: CPT | Mod: CPTII,S$GLB,, | Performed by: NEUROMUSCULOSKELETAL MEDICINE & OMM

## 2021-04-09 PROCEDURE — 1125F AMNT PAIN NOTED PAIN PRSNT: CPT | Mod: S$GLB,,, | Performed by: NEUROMUSCULOSKELETAL MEDICINE & OMM

## 2021-04-09 PROCEDURE — 99213 PR OFFICE/OUTPT VISIT, EST, LEVL III, 20-29 MIN: ICD-10-PCS | Mod: 25,S$GLB,, | Performed by: NEUROMUSCULOSKELETAL MEDICINE & OMM

## 2021-04-09 PROCEDURE — 98927 OSTEOPATH MANJ 5-6 REGIONS: CPT | Mod: S$GLB,,, | Performed by: NEUROMUSCULOSKELETAL MEDICINE & OMM

## 2021-04-09 PROCEDURE — 99999 PR PBB SHADOW E&M-EST. PATIENT-LVL III: ICD-10-PCS | Mod: PBBFAC,,, | Performed by: NEUROMUSCULOSKELETAL MEDICINE & OMM

## 2021-04-09 PROCEDURE — 3008F PR BODY MASS INDEX (BMI) DOCUMENTED: ICD-10-PCS | Mod: CPTII,S$GLB,, | Performed by: NEUROMUSCULOSKELETAL MEDICINE & OMM

## 2021-04-09 PROCEDURE — 1101F PR PT FALLS ASSESS DOC 0-1 FALLS W/OUT INJ PAST YR: ICD-10-PCS | Mod: CPTII,S$GLB,, | Performed by: NEUROMUSCULOSKELETAL MEDICINE & OMM

## 2021-04-09 PROCEDURE — 99213 OFFICE O/P EST LOW 20 MIN: CPT | Mod: 25,S$GLB,, | Performed by: NEUROMUSCULOSKELETAL MEDICINE & OMM

## 2021-04-09 PROCEDURE — 98927 PR OSTEOPATHIC MANIP,5-6 BODY REGN: ICD-10-PCS | Mod: S$GLB,,, | Performed by: NEUROMUSCULOSKELETAL MEDICINE & OMM

## 2021-04-13 ENCOUNTER — CLINICAL SUPPORT (OUTPATIENT)
Dept: REHABILITATION | Facility: OTHER | Age: 66
End: 2021-04-13
Payer: MEDICARE

## 2021-04-13 DIAGNOSIS — M25.512 ACUTE PAIN OF LEFT SHOULDER: ICD-10-CM

## 2021-04-13 DIAGNOSIS — R29.3 POSTURE ABNORMALITY: ICD-10-CM

## 2021-04-13 DIAGNOSIS — M25.612 DECREASED RANGE OF MOTION OF LEFT SHOULDER: ICD-10-CM

## 2021-04-13 DIAGNOSIS — R29.898 IMPAIRED STRENGTH OF SHOULDER MUSCLES: ICD-10-CM

## 2021-04-13 PROCEDURE — 97110 THERAPEUTIC EXERCISES: CPT | Mod: PN

## 2021-04-13 PROCEDURE — 97140 MANUAL THERAPY 1/> REGIONS: CPT | Mod: PN

## 2021-04-15 ENCOUNTER — CLINICAL SUPPORT (OUTPATIENT)
Dept: REHABILITATION | Facility: OTHER | Age: 66
End: 2021-04-15
Payer: MEDICARE

## 2021-04-15 DIAGNOSIS — R29.3 POSTURE ABNORMALITY: ICD-10-CM

## 2021-04-15 DIAGNOSIS — R29.898 IMPAIRED STRENGTH OF SHOULDER MUSCLES: ICD-10-CM

## 2021-04-15 DIAGNOSIS — M25.512 ACUTE PAIN OF LEFT SHOULDER: ICD-10-CM

## 2021-04-15 DIAGNOSIS — M25.612 DECREASED RANGE OF MOTION OF LEFT SHOULDER: ICD-10-CM

## 2021-04-15 PROCEDURE — 97140 MANUAL THERAPY 1/> REGIONS: CPT | Mod: PN

## 2021-04-15 PROCEDURE — 97110 THERAPEUTIC EXERCISES: CPT | Mod: PN

## 2021-04-20 ENCOUNTER — CLINICAL SUPPORT (OUTPATIENT)
Dept: REHABILITATION | Facility: OTHER | Age: 66
End: 2021-04-20
Payer: MEDICARE

## 2021-04-20 DIAGNOSIS — M25.512 ACUTE PAIN OF LEFT SHOULDER: ICD-10-CM

## 2021-04-20 DIAGNOSIS — M25.612 DECREASED RANGE OF MOTION OF LEFT SHOULDER: ICD-10-CM

## 2021-04-20 DIAGNOSIS — R29.3 POSTURE ABNORMALITY: ICD-10-CM

## 2021-04-20 DIAGNOSIS — R29.898 IMPAIRED STRENGTH OF SHOULDER MUSCLES: ICD-10-CM

## 2021-04-20 PROCEDURE — 97140 MANUAL THERAPY 1/> REGIONS: CPT | Mod: PN

## 2021-04-20 PROCEDURE — 97110 THERAPEUTIC EXERCISES: CPT | Mod: PN

## 2021-04-22 ENCOUNTER — CLINICAL SUPPORT (OUTPATIENT)
Dept: REHABILITATION | Facility: OTHER | Age: 66
End: 2021-04-22
Payer: MEDICARE

## 2021-04-22 DIAGNOSIS — R29.3 POSTURE ABNORMALITY: ICD-10-CM

## 2021-04-22 DIAGNOSIS — R29.898 IMPAIRED STRENGTH OF SHOULDER MUSCLES: ICD-10-CM

## 2021-04-22 DIAGNOSIS — M25.512 ACUTE PAIN OF LEFT SHOULDER: ICD-10-CM

## 2021-04-22 DIAGNOSIS — M25.612 DECREASED RANGE OF MOTION OF LEFT SHOULDER: ICD-10-CM

## 2021-04-22 PROCEDURE — 97110 THERAPEUTIC EXERCISES: CPT | Mod: PN

## 2021-05-04 ENCOUNTER — CLINICAL SUPPORT (OUTPATIENT)
Dept: REHABILITATION | Facility: OTHER | Age: 66
End: 2021-05-04
Payer: MEDICARE

## 2021-05-04 DIAGNOSIS — R29.898 IMPAIRED STRENGTH OF SHOULDER MUSCLES: ICD-10-CM

## 2021-05-04 DIAGNOSIS — M25.512 ACUTE PAIN OF LEFT SHOULDER: ICD-10-CM

## 2021-05-04 DIAGNOSIS — R29.3 POSTURE ABNORMALITY: ICD-10-CM

## 2021-05-04 DIAGNOSIS — M25.612 DECREASED RANGE OF MOTION OF LEFT SHOULDER: ICD-10-CM

## 2021-05-04 PROCEDURE — 97140 MANUAL THERAPY 1/> REGIONS: CPT | Mod: PN

## 2021-05-04 PROCEDURE — 97110 THERAPEUTIC EXERCISES: CPT | Mod: PN

## 2021-05-06 ENCOUNTER — CLINICAL SUPPORT (OUTPATIENT)
Dept: REHABILITATION | Facility: OTHER | Age: 66
End: 2021-05-06
Payer: MEDICARE

## 2021-05-06 DIAGNOSIS — R29.898 IMPAIRED STRENGTH OF SHOULDER MUSCLES: ICD-10-CM

## 2021-05-06 DIAGNOSIS — M25.612 DECREASED RANGE OF MOTION OF LEFT SHOULDER: ICD-10-CM

## 2021-05-06 DIAGNOSIS — R29.3 POSTURE ABNORMALITY: ICD-10-CM

## 2021-05-06 DIAGNOSIS — M25.512 ACUTE PAIN OF LEFT SHOULDER: ICD-10-CM

## 2021-05-06 PROCEDURE — 97110 THERAPEUTIC EXERCISES: CPT | Mod: PN

## 2021-05-07 PROBLEM — R29.3 POSTURE ABNORMALITY: Status: RESOLVED | Noted: 2020-11-23 | Resolved: 2021-05-06

## 2021-05-07 PROBLEM — M25.612 DECREASED RANGE OF MOTION OF LEFT SHOULDER: Status: RESOLVED | Noted: 2020-11-23 | Resolved: 2021-05-06

## 2021-05-07 PROBLEM — R29.898 IMPAIRED STRENGTH OF SHOULDER MUSCLES: Status: RESOLVED | Noted: 2020-11-23 | Resolved: 2021-05-06

## 2021-05-07 PROBLEM — M25.512 ACUTE PAIN OF LEFT SHOULDER: Status: RESOLVED | Noted: 2020-11-23 | Resolved: 2021-05-06

## 2021-05-17 ENCOUNTER — TELEPHONE (OUTPATIENT)
Dept: OPHTHALMOLOGY | Facility: CLINIC | Age: 66
End: 2021-05-17

## 2021-06-03 ENCOUNTER — PATIENT OUTREACH (OUTPATIENT)
Dept: ADMINISTRATIVE | Facility: HOSPITAL | Age: 66
End: 2021-06-03

## 2021-06-03 ENCOUNTER — PATIENT MESSAGE (OUTPATIENT)
Dept: ADMINISTRATIVE | Facility: HOSPITAL | Age: 66
End: 2021-06-03

## 2021-06-03 ENCOUNTER — PATIENT OUTREACH (OUTPATIENT)
Dept: ADMINISTRATIVE | Facility: OTHER | Age: 66
End: 2021-06-03

## 2021-06-03 DIAGNOSIS — Z12.11 ENCOUNTER FOR COLORECTAL CANCER SCREENING: Primary | ICD-10-CM

## 2021-06-03 DIAGNOSIS — Z12.12 ENCOUNTER FOR COLORECTAL CANCER SCREENING: Primary | ICD-10-CM

## 2021-06-04 ENCOUNTER — OFFICE VISIT (OUTPATIENT)
Dept: OPHTHALMOLOGY | Facility: CLINIC | Age: 66
End: 2021-06-04
Attending: OPHTHALMOLOGY
Payer: MEDICARE

## 2021-06-04 DIAGNOSIS — H26.493 POSTERIOR CAPSULAR OPACIFICATION, BILATERAL: Primary | ICD-10-CM

## 2021-06-04 PROCEDURE — 99999 PR PBB SHADOW E&M-EST. PATIENT-LVL II: CPT | Mod: PBBFAC,,, | Performed by: OPHTHALMOLOGY

## 2021-06-04 PROCEDURE — 99999 PR PBB SHADOW E&M-EST. PATIENT-LVL II: ICD-10-PCS | Mod: PBBFAC,,, | Performed by: OPHTHALMOLOGY

## 2021-06-04 PROCEDURE — 66821 PR DISCISSION,2ND CATARACT,LASER: ICD-10-PCS | Mod: 50,S$GLB,, | Performed by: OPHTHALMOLOGY

## 2021-06-04 PROCEDURE — 3288F PR FALLS RISK ASSESSMENT DOCUMENTED: ICD-10-PCS | Mod: CPTII,S$GLB,, | Performed by: OPHTHALMOLOGY

## 2021-06-04 PROCEDURE — 92012 PR EYE EXAM, EST PATIENT,INTERMED: ICD-10-PCS | Mod: 57,S$GLB,, | Performed by: OPHTHALMOLOGY

## 2021-06-04 PROCEDURE — 92012 INTRM OPH EXAM EST PATIENT: CPT | Mod: 57,S$GLB,, | Performed by: OPHTHALMOLOGY

## 2021-06-04 PROCEDURE — 3288F FALL RISK ASSESSMENT DOCD: CPT | Mod: CPTII,S$GLB,, | Performed by: OPHTHALMOLOGY

## 2021-06-04 PROCEDURE — 1101F PR PT FALLS ASSESS DOC 0-1 FALLS W/OUT INJ PAST YR: ICD-10-PCS | Mod: CPTII,S$GLB,, | Performed by: OPHTHALMOLOGY

## 2021-06-04 PROCEDURE — 66821 AFTER CATARACT LASER SURGERY: CPT | Mod: 50,S$GLB,, | Performed by: OPHTHALMOLOGY

## 2021-06-04 PROCEDURE — 1101F PT FALLS ASSESS-DOCD LE1/YR: CPT | Mod: CPTII,S$GLB,, | Performed by: OPHTHALMOLOGY

## 2021-06-04 PROCEDURE — 1126F AMNT PAIN NOTED NONE PRSNT: CPT | Mod: S$GLB,,, | Performed by: OPHTHALMOLOGY

## 2021-06-04 PROCEDURE — 1126F PR PAIN SEVERITY QUANTIFIED, NO PAIN PRESENT: ICD-10-PCS | Mod: S$GLB,,, | Performed by: OPHTHALMOLOGY

## 2021-07-06 ENCOUNTER — PATIENT MESSAGE (OUTPATIENT)
Dept: ADMINISTRATIVE | Facility: HOSPITAL | Age: 66
End: 2021-07-06

## 2021-09-08 ENCOUNTER — OFFICE VISIT (OUTPATIENT)
Dept: INTERNAL MEDICINE | Facility: CLINIC | Age: 66
End: 2021-09-08
Payer: MEDICARE

## 2021-09-08 VITALS
HEIGHT: 64 IN | WEIGHT: 174.81 LBS | HEART RATE: 73 BPM | DIASTOLIC BLOOD PRESSURE: 70 MMHG | BODY MASS INDEX: 29.84 KG/M2 | SYSTOLIC BLOOD PRESSURE: 121 MMHG | OXYGEN SATURATION: 100 %

## 2021-09-08 DIAGNOSIS — M75.82 ROTATOR CUFF TENDINITIS, LEFT: ICD-10-CM

## 2021-09-08 DIAGNOSIS — Z23 NEEDS FLU SHOT: ICD-10-CM

## 2021-09-08 DIAGNOSIS — M75.122 NONTRAUMATIC COMPLETE TEAR OF LEFT ROTATOR CUFF: ICD-10-CM

## 2021-09-08 DIAGNOSIS — L98.9 SKIN LESION: ICD-10-CM

## 2021-09-08 DIAGNOSIS — Z01.419 ROUTINE GYNECOLOGICAL EXAMINATION: ICD-10-CM

## 2021-09-08 DIAGNOSIS — Z23 NEED FOR DIPHTHERIA-TETANUS-PERTUSSIS (TDAP) VACCINE: ICD-10-CM

## 2021-09-08 DIAGNOSIS — Z00.00 ANNUAL PHYSICAL EXAM: Primary | ICD-10-CM

## 2021-09-08 DIAGNOSIS — E66.9 CLASS 1 OBESITY WITH SERIOUS COMORBIDITY AND BODY MASS INDEX (BMI) OF 30.0 TO 30.9 IN ADULT, UNSPECIFIED OBESITY TYPE: ICD-10-CM

## 2021-09-08 DIAGNOSIS — E55.9 VITAMIN D DEFICIENCY: ICD-10-CM

## 2021-09-08 DIAGNOSIS — I10 ESSENTIAL HYPERTENSION: ICD-10-CM

## 2021-09-08 DIAGNOSIS — Z12.11 SCREEN FOR COLON CANCER: ICD-10-CM

## 2021-09-08 DIAGNOSIS — Z12.31 BREAST CANCER SCREENING BY MAMMOGRAM: ICD-10-CM

## 2021-09-08 PROBLEM — E66.811 CLASS 1 OBESITY IN ADULT: Status: ACTIVE | Noted: 2021-09-08

## 2021-09-08 PROCEDURE — 1101F PR PT FALLS ASSESS DOC 0-1 FALLS W/OUT INJ PAST YR: ICD-10-PCS | Mod: CPTII,S$GLB,, | Performed by: INTERNAL MEDICINE

## 2021-09-08 PROCEDURE — 99499 UNLISTED E&M SERVICE: CPT | Mod: S$GLB,,, | Performed by: INTERNAL MEDICINE

## 2021-09-08 PROCEDURE — 99214 PR OFFICE/OUTPT VISIT, EST, LEVL IV, 30-39 MIN: ICD-10-PCS | Mod: S$GLB,,, | Performed by: INTERNAL MEDICINE

## 2021-09-08 PROCEDURE — 1126F PR PAIN SEVERITY QUANTIFIED, NO PAIN PRESENT: ICD-10-PCS | Mod: CPTII,S$GLB,, | Performed by: INTERNAL MEDICINE

## 2021-09-08 PROCEDURE — 1126F AMNT PAIN NOTED NONE PRSNT: CPT | Mod: CPTII,S$GLB,, | Performed by: INTERNAL MEDICINE

## 2021-09-08 PROCEDURE — 3288F PR FALLS RISK ASSESSMENT DOCUMENTED: ICD-10-PCS | Mod: CPTII,S$GLB,, | Performed by: INTERNAL MEDICINE

## 2021-09-08 PROCEDURE — 3074F PR MOST RECENT SYSTOLIC BLOOD PRESSURE < 130 MM HG: ICD-10-PCS | Mod: CPTII,S$GLB,, | Performed by: INTERNAL MEDICINE

## 2021-09-08 PROCEDURE — 3074F SYST BP LT 130 MM HG: CPT | Mod: CPTII,S$GLB,, | Performed by: INTERNAL MEDICINE

## 2021-09-08 PROCEDURE — 1101F PT FALLS ASSESS-DOCD LE1/YR: CPT | Mod: CPTII,S$GLB,, | Performed by: INTERNAL MEDICINE

## 2021-09-08 PROCEDURE — 1159F PR MEDICATION LIST DOCUMENTED IN MEDICAL RECORD: ICD-10-PCS | Mod: CPTII,S$GLB,, | Performed by: INTERNAL MEDICINE

## 2021-09-08 PROCEDURE — 99999 PR PBB SHADOW E&M-EST. PATIENT-LVL IV: ICD-10-PCS | Mod: PBBFAC,,, | Performed by: INTERNAL MEDICINE

## 2021-09-08 PROCEDURE — 3288F FALL RISK ASSESSMENT DOCD: CPT | Mod: CPTII,S$GLB,, | Performed by: INTERNAL MEDICINE

## 2021-09-08 PROCEDURE — 3078F DIAST BP <80 MM HG: CPT | Mod: CPTII,S$GLB,, | Performed by: INTERNAL MEDICINE

## 2021-09-08 PROCEDURE — 3008F BODY MASS INDEX DOCD: CPT | Mod: CPTII,S$GLB,, | Performed by: INTERNAL MEDICINE

## 2021-09-08 PROCEDURE — 99214 OFFICE O/P EST MOD 30 MIN: CPT | Mod: S$GLB,,, | Performed by: INTERNAL MEDICINE

## 2021-09-08 PROCEDURE — 99999 PR PBB SHADOW E&M-EST. PATIENT-LVL IV: CPT | Mod: PBBFAC,,, | Performed by: INTERNAL MEDICINE

## 2021-09-08 PROCEDURE — 99499 RISK ADDL DX/OHS AUDIT: ICD-10-PCS | Mod: S$GLB,,, | Performed by: INTERNAL MEDICINE

## 2021-09-08 PROCEDURE — 3008F PR BODY MASS INDEX (BMI) DOCUMENTED: ICD-10-PCS | Mod: CPTII,S$GLB,, | Performed by: INTERNAL MEDICINE

## 2021-09-08 PROCEDURE — 1159F MED LIST DOCD IN RCRD: CPT | Mod: CPTII,S$GLB,, | Performed by: INTERNAL MEDICINE

## 2021-09-08 PROCEDURE — 3078F PR MOST RECENT DIASTOLIC BLOOD PRESSURE < 80 MM HG: ICD-10-PCS | Mod: CPTII,S$GLB,, | Performed by: INTERNAL MEDICINE

## 2021-09-08 RX ORDER — SPIRONOLACTONE AND HYDROCHLOROTHIAZIDE 25; 25 MG/1; MG/1
1 TABLET ORAL EVERY MORNING
Qty: 90 TABLET | Refills: 1 | Status: SHIPPED | OUTPATIENT
Start: 2021-09-08 | End: 2022-03-14

## 2021-09-09 ENCOUNTER — PATIENT MESSAGE (OUTPATIENT)
Dept: INTERNAL MEDICINE | Facility: CLINIC | Age: 66
End: 2021-09-09

## 2021-09-09 ENCOUNTER — HOSPITAL ENCOUNTER (OUTPATIENT)
Dept: RADIOLOGY | Facility: HOSPITAL | Age: 66
Discharge: HOME | End: 2021-09-09
Attending: INTERNAL MEDICINE
Payer: MEDICARE

## 2021-09-09 DIAGNOSIS — E78.2 MIXED HYPERLIPIDEMIA: Primary | ICD-10-CM

## 2021-09-09 DIAGNOSIS — E55.9 VITAMIN D DEFICIENCY: ICD-10-CM

## 2021-09-09 DIAGNOSIS — Z12.31 BREAST CANCER SCREENING BY MAMMOGRAM: ICD-10-CM

## 2021-09-09 PROCEDURE — 77063 MAMMO DIGITAL SCREENING BILAT WITH TOMO: ICD-10-PCS | Mod: 26,,, | Performed by: RADIOLOGY

## 2021-09-09 PROCEDURE — 77067 SCR MAMMO BI INCL CAD: CPT | Mod: 26,,, | Performed by: RADIOLOGY

## 2021-09-09 PROCEDURE — 77067 SCR MAMMO BI INCL CAD: CPT | Mod: TC

## 2021-09-09 PROCEDURE — 77067 MAMMO DIGITAL SCREENING BILAT WITH TOMO: ICD-10-PCS | Mod: 26,,, | Performed by: RADIOLOGY

## 2021-09-09 PROCEDURE — 77063 BREAST TOMOSYNTHESIS BI: CPT | Mod: 26,,, | Performed by: RADIOLOGY

## 2021-09-10 ENCOUNTER — PATIENT MESSAGE (OUTPATIENT)
Dept: INTERNAL MEDICINE | Facility: CLINIC | Age: 66
End: 2021-09-10

## 2021-09-10 RX ORDER — SEMAGLUTIDE 1 MG/.5ML
1 INJECTION, SOLUTION SUBCUTANEOUS WEEKLY
Qty: 2 ML | Refills: 0 | Status: SHIPPED | OUTPATIENT
Start: 2021-09-10 | End: 2022-11-23

## 2021-09-10 RX ORDER — SEMAGLUTIDE 2.4 MG/.75ML
2.4 INJECTION, SOLUTION SUBCUTANEOUS WEEKLY
Qty: 3 ML | Refills: 6 | Status: SHIPPED | OUTPATIENT
Start: 2021-09-10 | End: 2022-11-23

## 2021-09-10 RX ORDER — SEMAGLUTIDE 0.5 MG/.5ML
0.5 INJECTION, SOLUTION SUBCUTANEOUS WEEKLY
Qty: 2 ML | Refills: 0 | Status: SHIPPED | OUTPATIENT
Start: 2021-09-10 | End: 2022-01-05

## 2021-09-10 RX ORDER — SEMAGLUTIDE 1.7 MG/.75ML
1.7 INJECTION, SOLUTION SUBCUTANEOUS WEEKLY
Qty: 3 ML | Refills: 0 | Status: SHIPPED | OUTPATIENT
Start: 2021-09-10 | End: 2022-11-23

## 2021-09-10 RX ORDER — SEMAGLUTIDE 0.5 MG/.5ML
0.5 INJECTION, SOLUTION SUBCUTANEOUS WEEKLY
Qty: 2 ML | Refills: 1 | Status: SHIPPED | OUTPATIENT
Start: 2021-09-10 | End: 2021-09-10 | Stop reason: CLARIF

## 2021-09-10 RX ORDER — SEMAGLUTIDE 0.25 MG/.5ML
0.25 INJECTION, SOLUTION SUBCUTANEOUS WEEKLY
Qty: 2 ML | Refills: 0 | Status: SHIPPED | OUTPATIENT
Start: 2021-09-10 | End: 2022-01-05

## 2021-10-04 ENCOUNTER — PATIENT MESSAGE (OUTPATIENT)
Dept: ADMINISTRATIVE | Facility: HOSPITAL | Age: 66
End: 2021-10-04

## 2021-10-05 RX ORDER — SPIRONOLACTONE 50 MG/1
100 TABLET, FILM COATED ORAL EVERY MORNING
Qty: 180 TABLET | Refills: 1 | OUTPATIENT
Start: 2021-10-05 | End: 2022-10-05

## 2021-10-06 ENCOUNTER — PATIENT OUTREACH (OUTPATIENT)
Dept: ADMINISTRATIVE | Facility: OTHER | Age: 66
End: 2021-10-06

## 2021-10-07 ENCOUNTER — OFFICE VISIT (OUTPATIENT)
Dept: DERMATOLOGY | Facility: CLINIC | Age: 66
End: 2021-10-07
Payer: MEDICARE

## 2021-10-07 DIAGNOSIS — L82.1 SEBORRHEIC KERATOSES: ICD-10-CM

## 2021-10-07 DIAGNOSIS — L98.9 SKIN LESION: ICD-10-CM

## 2021-10-07 DIAGNOSIS — D18.01 CHERRY ANGIOMA: ICD-10-CM

## 2021-10-07 DIAGNOSIS — L81.4 LENTIGO: ICD-10-CM

## 2021-10-07 DIAGNOSIS — D22.9 MULTIPLE BENIGN NEVI: ICD-10-CM

## 2021-10-07 DIAGNOSIS — Z12.83 SCREENING EXAM FOR SKIN CANCER: ICD-10-CM

## 2021-10-07 DIAGNOSIS — L73.8 SEBACEOUS HYPERPLASIA: ICD-10-CM

## 2021-10-07 DIAGNOSIS — L57.0 AK (ACTINIC KERATOSIS): Primary | ICD-10-CM

## 2021-10-07 PROCEDURE — 17000 DESTRUCT PREMALG LESION: CPT | Mod: S$GLB,,, | Performed by: DERMATOLOGY

## 2021-10-07 PROCEDURE — 3288F FALL RISK ASSESSMENT DOCD: CPT | Mod: CPTII,S$GLB,, | Performed by: DERMATOLOGY

## 2021-10-07 PROCEDURE — 99999 PR PBB SHADOW E&M-EST. PATIENT-LVL III: CPT | Mod: PBBFAC,,, | Performed by: DERMATOLOGY

## 2021-10-07 PROCEDURE — 1159F MED LIST DOCD IN RCRD: CPT | Mod: CPTII,S$GLB,, | Performed by: DERMATOLOGY

## 2021-10-07 PROCEDURE — 1160F RVW MEDS BY RX/DR IN RCRD: CPT | Mod: CPTII,S$GLB,, | Performed by: DERMATOLOGY

## 2021-10-07 PROCEDURE — 99999 PR PBB SHADOW E&M-EST. PATIENT-LVL III: ICD-10-PCS | Mod: PBBFAC,,, | Performed by: DERMATOLOGY

## 2021-10-07 PROCEDURE — 3288F PR FALLS RISK ASSESSMENT DOCUMENTED: ICD-10-PCS | Mod: CPTII,S$GLB,, | Performed by: DERMATOLOGY

## 2021-10-07 PROCEDURE — 1101F PT FALLS ASSESS-DOCD LE1/YR: CPT | Mod: CPTII,S$GLB,, | Performed by: DERMATOLOGY

## 2021-10-07 PROCEDURE — 1101F PR PT FALLS ASSESS DOC 0-1 FALLS W/OUT INJ PAST YR: ICD-10-PCS | Mod: CPTII,S$GLB,, | Performed by: DERMATOLOGY

## 2021-10-07 PROCEDURE — 99203 OFFICE O/P NEW LOW 30 MIN: CPT | Mod: 25,S$GLB,, | Performed by: DERMATOLOGY

## 2021-10-07 PROCEDURE — 1126F PR PAIN SEVERITY QUANTIFIED, NO PAIN PRESENT: ICD-10-PCS | Mod: CPTII,S$GLB,, | Performed by: DERMATOLOGY

## 2021-10-07 PROCEDURE — 1159F PR MEDICATION LIST DOCUMENTED IN MEDICAL RECORD: ICD-10-PCS | Mod: CPTII,S$GLB,, | Performed by: DERMATOLOGY

## 2021-10-07 PROCEDURE — 1126F AMNT PAIN NOTED NONE PRSNT: CPT | Mod: CPTII,S$GLB,, | Performed by: DERMATOLOGY

## 2021-10-07 PROCEDURE — 17000 PR DESTRUCTION(LASER SURGERY,CRYOSURGERY,CHEMOSURGERY),PREMALIGNANT LESIONS,FIRST LESION: ICD-10-PCS | Mod: S$GLB,,, | Performed by: DERMATOLOGY

## 2021-10-07 PROCEDURE — 1160F PR REVIEW ALL MEDS BY PRESCRIBER/CLIN PHARMACIST DOCUMENTED: ICD-10-PCS | Mod: CPTII,S$GLB,, | Performed by: DERMATOLOGY

## 2021-10-07 PROCEDURE — 99203 PR OFFICE/OUTPT VISIT, NEW, LEVL III, 30-44 MIN: ICD-10-PCS | Mod: 25,S$GLB,, | Performed by: DERMATOLOGY

## 2021-11-01 ENCOUNTER — PATIENT OUTREACH (OUTPATIENT)
Dept: ADMINISTRATIVE | Facility: HOSPITAL | Age: 66
End: 2021-11-01
Payer: MEDICARE

## 2021-11-29 ENCOUNTER — PATIENT MESSAGE (OUTPATIENT)
Dept: INTERNAL MEDICINE | Facility: CLINIC | Age: 66
End: 2021-11-29
Payer: MEDICARE

## 2021-12-01 RX ORDER — SEMAGLUTIDE 0.5 MG/.5ML
1 INJECTION, SOLUTION SUBCUTANEOUS WEEKLY
Qty: 2 ML | Refills: 11 | Status: SHIPPED | OUTPATIENT
Start: 2021-12-01 | End: 2022-11-23 | Stop reason: ALTCHOICE

## 2021-12-14 DIAGNOSIS — Z12.11 COLON CANCER SCREENING: Primary | ICD-10-CM

## 2021-12-14 RX ORDER — SODIUM, POTASSIUM,MAG SULFATES 17.5-3.13G
1 SOLUTION, RECONSTITUTED, ORAL ORAL DAILY
Qty: 1 KIT | Refills: 0 | Status: SHIPPED | OUTPATIENT
Start: 2021-12-14 | End: 2021-12-16

## 2021-12-30 ENCOUNTER — PES CALL (OUTPATIENT)
Dept: ADMINISTRATIVE | Facility: CLINIC | Age: 66
End: 2021-12-30
Payer: MEDICARE

## 2022-01-04 ENCOUNTER — OFFICE VISIT (OUTPATIENT)
Dept: INTERNAL MEDICINE | Facility: CLINIC | Age: 67
End: 2022-01-04
Payer: MEDICARE

## 2022-01-04 VITALS
WEIGHT: 158.94 LBS | DIASTOLIC BLOOD PRESSURE: 68 MMHG | SYSTOLIC BLOOD PRESSURE: 100 MMHG | BODY MASS INDEX: 26.48 KG/M2 | HEART RATE: 76 BPM | HEIGHT: 65 IN

## 2022-01-04 DIAGNOSIS — M85.80 OSTEOPENIA, UNSPECIFIED LOCATION: ICD-10-CM

## 2022-01-04 DIAGNOSIS — Z00.00 ENCOUNTER FOR PREVENTIVE HEALTH EXAMINATION: Primary | ICD-10-CM

## 2022-01-04 DIAGNOSIS — I77.9 MILD CAROTID ARTERY DISEASE: ICD-10-CM

## 2022-01-04 DIAGNOSIS — J30.9 CHRONIC ALLERGIC RHINITIS: ICD-10-CM

## 2022-01-04 DIAGNOSIS — I10 ESSENTIAL HYPERTENSION: ICD-10-CM

## 2022-01-04 DIAGNOSIS — H91.90 PERCEIVED HEARING LOSS: ICD-10-CM

## 2022-01-04 DIAGNOSIS — M75.82 ROTATOR CUFF TENDINITIS, LEFT: ICD-10-CM

## 2022-01-04 DIAGNOSIS — M75.122 NONTRAUMATIC COMPLETE TEAR OF LEFT ROTATOR CUFF: ICD-10-CM

## 2022-01-04 PROCEDURE — 99999 PR PBB SHADOW E&M-EST. PATIENT-LVL V: ICD-10-PCS | Mod: PBBFAC,,, | Performed by: NURSE PRACTITIONER

## 2022-01-04 PROCEDURE — 3008F BODY MASS INDEX DOCD: CPT | Mod: CPTII,S$GLB,, | Performed by: NURSE PRACTITIONER

## 2022-01-04 PROCEDURE — 1159F PR MEDICATION LIST DOCUMENTED IN MEDICAL RECORD: ICD-10-PCS | Mod: CPTII,S$GLB,, | Performed by: NURSE PRACTITIONER

## 2022-01-04 PROCEDURE — 1160F PR REVIEW ALL MEDS BY PRESCRIBER/CLIN PHARMACIST DOCUMENTED: ICD-10-PCS | Mod: CPTII,S$GLB,, | Performed by: NURSE PRACTITIONER

## 2022-01-04 PROCEDURE — 3288F PR FALLS RISK ASSESSMENT DOCUMENTED: ICD-10-PCS | Mod: CPTII,S$GLB,, | Performed by: NURSE PRACTITIONER

## 2022-01-04 PROCEDURE — 3078F PR MOST RECENT DIASTOLIC BLOOD PRESSURE < 80 MM HG: ICD-10-PCS | Mod: CPTII,S$GLB,, | Performed by: NURSE PRACTITIONER

## 2022-01-04 PROCEDURE — 3074F PR MOST RECENT SYSTOLIC BLOOD PRESSURE < 130 MM HG: ICD-10-PCS | Mod: CPTII,S$GLB,, | Performed by: NURSE PRACTITIONER

## 2022-01-04 PROCEDURE — G0439 PR MEDICARE ANNUAL WELLNESS SUBSEQUENT VISIT: ICD-10-PCS | Mod: S$GLB,,, | Performed by: NURSE PRACTITIONER

## 2022-01-04 PROCEDURE — 99499 RISK ADDL DX/OHS AUDIT: ICD-10-PCS | Mod: S$GLB,,, | Performed by: NURSE PRACTITIONER

## 2022-01-04 PROCEDURE — 1101F PT FALLS ASSESS-DOCD LE1/YR: CPT | Mod: CPTII,S$GLB,, | Performed by: NURSE PRACTITIONER

## 2022-01-04 PROCEDURE — 99999 PR PBB SHADOW E&M-EST. PATIENT-LVL V: CPT | Mod: PBBFAC,,, | Performed by: NURSE PRACTITIONER

## 2022-01-04 PROCEDURE — 1126F PR PAIN SEVERITY QUANTIFIED, NO PAIN PRESENT: ICD-10-PCS | Mod: CPTII,S$GLB,, | Performed by: NURSE PRACTITIONER

## 2022-01-04 PROCEDURE — 1160F RVW MEDS BY RX/DR IN RCRD: CPT | Mod: CPTII,S$GLB,, | Performed by: NURSE PRACTITIONER

## 2022-01-04 PROCEDURE — 1159F MED LIST DOCD IN RCRD: CPT | Mod: CPTII,S$GLB,, | Performed by: NURSE PRACTITIONER

## 2022-01-04 PROCEDURE — 3008F PR BODY MASS INDEX (BMI) DOCUMENTED: ICD-10-PCS | Mod: CPTII,S$GLB,, | Performed by: NURSE PRACTITIONER

## 2022-01-04 PROCEDURE — 1126F AMNT PAIN NOTED NONE PRSNT: CPT | Mod: CPTII,S$GLB,, | Performed by: NURSE PRACTITIONER

## 2022-01-04 PROCEDURE — 99499 UNLISTED E&M SERVICE: CPT | Mod: S$GLB,,, | Performed by: NURSE PRACTITIONER

## 2022-01-04 PROCEDURE — 3288F FALL RISK ASSESSMENT DOCD: CPT | Mod: CPTII,S$GLB,, | Performed by: NURSE PRACTITIONER

## 2022-01-04 PROCEDURE — 3074F SYST BP LT 130 MM HG: CPT | Mod: CPTII,S$GLB,, | Performed by: NURSE PRACTITIONER

## 2022-01-04 PROCEDURE — 3078F DIAST BP <80 MM HG: CPT | Mod: CPTII,S$GLB,, | Performed by: NURSE PRACTITIONER

## 2022-01-04 PROCEDURE — 1101F PR PT FALLS ASSESS DOC 0-1 FALLS W/OUT INJ PAST YR: ICD-10-PCS | Mod: CPTII,S$GLB,, | Performed by: NURSE PRACTITIONER

## 2022-01-04 PROCEDURE — 1170F FXNL STATUS ASSESSED: CPT | Mod: CPTII,S$GLB,, | Performed by: NURSE PRACTITIONER

## 2022-01-04 PROCEDURE — 1170F PR FUNCTIONAL STATUS ASSESSED: ICD-10-PCS | Mod: CPTII,S$GLB,, | Performed by: NURSE PRACTITIONER

## 2022-01-04 PROCEDURE — G0439 PPPS, SUBSEQ VISIT: HCPCS | Mod: S$GLB,,, | Performed by: NURSE PRACTITIONER

## 2022-01-04 RX ORDER — MINOXIDIL 2 %
1 SOLUTION, NON-ORAL TOPICAL DAILY
COMMUNITY
End: 2022-06-20

## 2022-01-04 RX ORDER — FAMOTIDINE 10 MG/1
10 TABLET ORAL DAILY
COMMUNITY
End: 2023-11-20 | Stop reason: ALTCHOICE

## 2022-01-04 NOTE — PROGRESS NOTES
"  Jyoti Tyler presented for a  Medicare AWV and comprehensive Health Risk Assessment today. The following components were reviewed and updated:    · Medical history  · Family History  · Social history  · Allergies and Current Medications  · Health Risk Assessment  · Health Maintenance  · Care Team         ** See Completed Assessments for Annual Wellness Visit within the encounter summary.**         The following assessments were completed:  · Living Situation  · CAGE  · Depression Screening  · Timed Get Up and Go  · Whisper Test  · Cognitive Function Screening  · Nutrition Screening  · ADL Screening  · PAQ Screening        Vitals:    01/04/22 0937   BP: 100/68   BP Location: Left arm   Patient Position: Sitting   Pulse: 76   Weight: 72.1 kg (158 lb 15.2 oz)   Height: 5' 4.5" (1.638 m)     Body mass index is 26.86 kg/m².     Physical Exam  Vitals reviewed.   Constitutional:       General: She is not in acute distress.     Appearance: She is well-developed. She is not diaphoretic.   HENT:      Head: Normocephalic and atraumatic.   Eyes:      General: No scleral icterus.     Conjunctiva/sclera: Conjunctivae normal.   Cardiovascular:      Rate and Rhythm: Normal rate and regular rhythm.      Pulses: Normal pulses.      Heart sounds: Normal heart sounds.   Pulmonary:      Effort: Pulmonary effort is normal. No respiratory distress.      Breath sounds: Normal breath sounds.   Abdominal:      General: There is no distension.   Musculoskeletal:         General: Normal range of motion.      Cervical back: Normal range of motion and neck supple.   Skin:     General: Skin is warm and dry.   Neurological:      Mental Status: She is alert and oriented to person, place, and time.   Psychiatric:         Behavior: Behavior normal.           Diagnoses and health risks identified today and associated recommendations/orders:    1. Encounter for preventive health examination  Assessments completed  Preventive health recommendations " reviewed    2. Essential hypertension  Mildly low today  Pt feeling well, no symptoms of hypotension  Will continue to monitor through the digital htn program  Controlled with current medical therapy  Followed by PCP.     3. Mild carotid artery disease  Seen on us from 04/05/16, 20-39% stenosis bilaterally  Followed by PCP.     4. Osteopenia, unspecified location  Stable.   Does weight bearing exercises  Followed by PCP.     5. Rotator cuff tendinitis, left  improved  Controlled with current medical therapy  Followed by ortho/sprots medicine.     6. Nontraumatic tear of left rotator cuff  improved   Controlled with current medical therapy  Followed by ortho/sports medicine.     7. Chronic allergic rhinitis  Stable.   Controlled with current medical therapy  Followed by PCP.     8. Perceived hearing loss  - Ambulatory referral/consult to Audiology; Future  - Ambulatory referral/consult to ENT; Future    Provided Jyoti with a 5-10 year written screening schedule and personal prevention plan. Recommendations were developed using the USPSTF age appropriate recommendations. Education, counseling, and referrals were provided as needed. After Visit Summary printed and given to patient which includes a list of additional screenings\tests needed.    Follow up in about 8 months (around 9/4/2022) for a routine visit with your PCP or sooner if needed.    Heather Weinberg, CARLOS

## 2022-01-04 NOTE — PROGRESS NOTES
I offered to discuss advanced care planning, including how to pick a person who would make decisions for you if you were unable to make them for yourself, called a health care power of , and what kind of decisions you might make such as use of life sustaining treatments such as ventilators and tube feeding when faced with a life limiting illness recorded on a living will that they will need to know. (How you want to be cared for as you near the end of your natural life)     X Patient declined

## 2022-01-04 NOTE — PATIENT INSTRUCTIONS
Referral to audiology/ent placed.  Can schedule at check out desk or call [phone] to schedule    Female provider taking new patients: Dr. Borrero, Dr. Steiner,  Possibly Dr. Govea      Counseling and Referral of Other Preventative  (Italic type indicates deductible and co-insurance are waived)    Patient Name: Jyoti Tyler  Today's Date: 1/4/2022    Health Maintenance       Date Due Completion Date    TETANUS VACCINE Never done ---    Colorectal Cancer Screening 02/02/2019 2/2/2009    Influenza Vaccine (1) 09/01/2021 10/30/2009    COVID-19 Vaccine (3 - Booster for Moderna series) 09/01/2021 3/1/2021    DEXA SCAN 06/21/2022 6/21/2019    Mammogram 09/09/2022 9/9/2021    Lipid Panel 09/09/2026 9/9/2021        No orders of the defined types were placed in this encounter.    The following information is provided to all patients.  This information is to help you find resources for any of the problems found today that may be affecting your health:                Living healthy guide: www.[facility].[place].gov      Understanding Diabetes: www.diabetes.org      Eating healthy: www.cdc.gov/healthyweight      CDC home safety checklist: www.cdc.gov/steadi/patient.html      Agency on Aging: www.goea.[place].gov      Alcoholics anonymous (AA): www.aa.org      Physical Activity: www.reena.nih.gov/dq9itsn      Tobacco use: www.quitwithusla.org

## 2022-01-05 ENCOUNTER — PATIENT MESSAGE (OUTPATIENT)
Dept: INTERNAL MEDICINE | Facility: CLINIC | Age: 67
End: 2022-01-05
Payer: MEDICARE

## 2022-01-05 DIAGNOSIS — Z01.818 PRE-OP TESTING: ICD-10-CM

## 2022-01-05 RX ORDER — SEMAGLUTIDE 1 MG/.5ML
1 INJECTION, SOLUTION SUBCUTANEOUS WEEKLY
Qty: 2 ML | Refills: 2 | Status: SHIPPED | OUTPATIENT
Start: 2022-01-05 | End: 2022-11-23

## 2022-01-18 ENCOUNTER — PATIENT MESSAGE (OUTPATIENT)
Dept: ADMINISTRATIVE | Facility: HOSPITAL | Age: 67
End: 2022-01-18
Payer: MEDICARE

## 2022-01-23 ENCOUNTER — PATIENT OUTREACH (OUTPATIENT)
Dept: ADMINISTRATIVE | Facility: OTHER | Age: 67
End: 2022-01-23
Payer: MEDICARE

## 2022-01-23 NOTE — PROGRESS NOTES
LINKS immunization registry updated  Care Everywhere updated  Health Maintenance updated  Chart reviewed for overdue Proactive [facility] Encounters (AMELIA) health maintenance testing (CRS, Breast Ca, Diabetic Eye Exam)   Orders entered:N/A  Colonoscopy scheduled for 3/8/22

## 2022-03-05 ENCOUNTER — LAB VISIT (OUTPATIENT)
Dept: PRIMARY CARE CLINIC | Facility: CLINIC | Age: 67
End: 2022-03-05
Payer: MEDICARE

## 2022-03-05 DIAGNOSIS — Z01.818 PRE-OP TESTING: ICD-10-CM

## 2022-03-05 PROCEDURE — U0005 INFEC AGEN DETEC AMPLI PROBE: HCPCS | Performed by: CLINICAL NURSE SPECIALIST

## 2022-03-05 PROCEDURE — U0003 INFECTIOUS AGENT DETECTION BY NUCLEIC ACID (DNA OR RNA); SEVERE ACUTE RESPIRATORY SYNDROME CORONAVIRUS 2 (SARS-COV-2) (CORONAVIRUS DISEASE [COVID-19]), AMPLIFIED PROBE TECHNIQUE, MAKING USE OF HIGH THROUGHPUT TECHNOLOGIES AS DESCRIBED BY CMS-2020-01-R: HCPCS | Performed by: CLINICAL NURSE SPECIALIST

## 2022-03-06 LAB
SARS-COV-2 RNA RESP QL NAA+PROBE: NOT DETECTED
SARS-COV-2- CYCLE NUMBER: NORMAL

## 2022-03-07 ENCOUNTER — NURSE TRIAGE (OUTPATIENT)
Dept: ADMINISTRATIVE | Facility: CLINIC | Age: 67
End: 2022-03-07
Payer: MEDICARE

## 2022-03-08 ENCOUNTER — ANESTHESIA (OUTPATIENT)
Dept: ENDOSCOPY | Facility: HOSPITAL | Age: 67
End: 2022-03-08
Payer: MEDICARE

## 2022-03-08 ENCOUNTER — ANESTHESIA EVENT (OUTPATIENT)
Dept: ENDOSCOPY | Facility: HOSPITAL | Age: 67
End: 2022-03-08
Payer: MEDICARE

## 2022-03-08 ENCOUNTER — HOSPITAL ENCOUNTER (OUTPATIENT)
Facility: HOSPITAL | Age: 67
Discharge: HOME | End: 2022-03-08
Attending: COLON & RECTAL SURGERY | Admitting: COLON & RECTAL SURGERY
Payer: MEDICARE

## 2022-03-08 VITALS
HEIGHT: 65 IN | TEMPERATURE: 98 F | BODY MASS INDEX: 24.83 KG/M2 | WEIGHT: 149 LBS | OXYGEN SATURATION: 99 % | HEART RATE: 72 BPM | DIASTOLIC BLOOD PRESSURE: 60 MMHG | SYSTOLIC BLOOD PRESSURE: 120 MMHG | RESPIRATION RATE: 16 BRPM

## 2022-03-08 DIAGNOSIS — Z12.11 SCREEN FOR COLON CANCER: ICD-10-CM

## 2022-03-08 PROCEDURE — 37000008 HC ANESTHESIA 1ST 15 MINUTES: Performed by: COLON & RECTAL SURGERY

## 2022-03-08 PROCEDURE — 45385 COLONOSCOPY W/LESION REMOVAL: CPT | Mod: PT | Performed by: COLON & RECTAL SURGERY

## 2022-03-08 PROCEDURE — 88305 TISSUE EXAM BY PATHOLOGIST: ICD-10-PCS | Mod: 26,,, | Performed by: PATHOLOGY

## 2022-03-08 PROCEDURE — 88305 TISSUE EXAM BY PATHOLOGIST: CPT | Mod: 26,,, | Performed by: PATHOLOGY

## 2022-03-08 PROCEDURE — 45385 PR COLONOSCOPY,REMV LESN,SNARE: ICD-10-PCS | Mod: PT,,, | Performed by: COLON & RECTAL SURGERY

## 2022-03-08 PROCEDURE — 63600175 PHARM REV CODE 636 W HCPCS: Performed by: NURSE ANESTHETIST, CERTIFIED REGISTERED

## 2022-03-08 PROCEDURE — 27201089 HC SNARE, DISP (ANY): Performed by: COLON & RECTAL SURGERY

## 2022-03-08 PROCEDURE — 37000009 HC ANESTHESIA EA ADD 15 MINS: Performed by: COLON & RECTAL SURGERY

## 2022-03-08 PROCEDURE — E9220 PRA ENDO ANESTHESIA: ICD-10-PCS | Mod: PT,,, | Performed by: NURSE ANESTHETIST, CERTIFIED REGISTERED

## 2022-03-08 PROCEDURE — 88305 TISSUE EXAM BY PATHOLOGIST: CPT | Performed by: PATHOLOGY

## 2022-03-08 PROCEDURE — E9220 PRA ENDO ANESTHESIA: HCPCS | Mod: PT,,, | Performed by: NURSE ANESTHETIST, CERTIFIED REGISTERED

## 2022-03-08 PROCEDURE — 45385 COLONOSCOPY W/LESION REMOVAL: CPT | Mod: PT,,, | Performed by: COLON & RECTAL SURGERY

## 2022-03-08 RX ORDER — SODIUM CHLORIDE 9 MG/ML
INJECTION, SOLUTION INTRAVENOUS CONTINUOUS
Status: DISCONTINUED | OUTPATIENT
Start: 2022-03-08 | End: 2022-03-08 | Stop reason: HOSPADM

## 2022-03-08 RX ORDER — PROPOFOL 10 MG/ML
VIAL (ML) INTRAVENOUS
Status: DISCONTINUED | OUTPATIENT
Start: 2022-03-08 | End: 2022-03-08

## 2022-03-08 RX ORDER — PROPOFOL 10 MG/ML
VIAL (ML) INTRAVENOUS CONTINUOUS PRN
Status: DISCONTINUED | OUTPATIENT
Start: 2022-03-08 | End: 2022-03-08

## 2022-03-08 RX ADMIN — PROPOFOL 50 MG: 10 INJECTION, EMULSION INTRAVENOUS at 08:03

## 2022-03-08 RX ADMIN — PROPOFOL 150 MCG/KG/MIN: 10 INJECTION, EMULSION INTRAVENOUS at 08:03

## 2022-03-08 NOTE — TELEPHONE ENCOUNTER
Patient is scheduled for a procedure tomorrow. She has questions about her arrival time. Confirmed in the patient's chart that her procedure is at 0830. Advised the patient to arrive around 0700. Patient VU.  Advised the patient to call back with any further questions or concerns.     Reason for Disposition   Question about upcoming scheduled test, no triage required and triager able to answer question    Protocols used: INFORMATION ONLY CALL - NO TRIAGE-A-

## 2022-03-08 NOTE — ANESTHESIA PREPROCEDURE EVALUATION
03/08/2022  Jyoti Tyler is a 66 y.o., female.  Past Surgical History:   Procedure Laterality Date    CATARACT EXTRACTION W/  INTRAOCULAR LENS IMPLANT Right 9/3/2020    Procedure: EXTRACTION, CATARACT, WITH IOL INSERTION;  Surgeon: Ambika Begum MD;  Location: [facility];  Service: Ophthalmology;  Laterality: Right;    CATARACT EXTRACTION W/  INTRAOCULAR LENS IMPLANT Left 9/21/2020    Procedure: EXTRACTION, CATARACT, WITH IOL INSERTION;  Surgeon: Ambika Begum MD;  Location: [facility];  Service: Ophthalmology;  Laterality: Left;    CHOLECYSTECTOMY      EYE SURGERY       Past Medical History:   Diagnosis Date    Anisometropia     Cataract     HTN (hypertension) 11/7/2012    Hyperopia     OD    Hypertension     Osteoporosis screening            Pre-op Assessment    I have reviewed the Patient Summary Reports.     I have reviewed the Nursing Notes.    I have reviewed the Medications.     Review of Systems  Anesthesia Hx:  No problems with previous Anesthesia    Hematology/Oncology:  Hematology Normal   Oncology Normal     EENT/Dental:EENT/Dental Normal   Cardiovascular:   Hypertension    Pulmonary:  Pulmonary Normal    Renal/:  Renal/ Normal     Hepatic/GI:  Hepatic/GI Normal    Musculoskeletal:  Musculoskeletal Normal    Neurological:  Neurology Normal    Endocrine:  Endocrine Normal    Dermatological:  Skin Normal    Psych:  Psychiatric Normal           Physical Exam  General: Well nourished    Airway:  Mallampati: I   Mouth Opening: Normal  TM Distance: Normal  Tongue: Normal  Neck ROM: Normal ROM    Dental:  Intact        Anesthesia Plan  Type of Anesthesia, risks & benefits discussed:    Anesthesia Type: Gen Natural Airway  Intra-op Monitoring Plan: Standard ASA Monitors  Post Op Pain Control Plan:   (medical reason for not using multimodal pain management)  Induction:  IV  Informed Consent:  Informed consent signed with the Patient and all parties understand the risks and agree with anesthesia plan.  All questions answered.   ASA Score: 2    Ready For Surgery From Anesthesia Perspective.     .

## 2022-03-08 NOTE — TRANSFER OF CARE
"Anesthesia Transfer of Care Note    Patient: Jyoti Tyler    Procedure(s) Performed: Procedure(s) (LRB):  COLONOSCOPY (N/A)    Patient location: GI    Anesthesia Type: general    Transport from OR: Transported from OR on room air with adequate spontaneous ventilation    Post pain: adequate analgesia    Post assessment: no apparent anesthetic complications and tolerated procedure well    Post vital signs: stable    Level of consciousness: alert, oriented and awake    Nausea/Vomiting: no nausea/vomiting    Complications: none    Transfer of care protocol was followed      Last vitals:   Visit Vitals  BP (!) 100/52   Pulse 65   Temp 36.6 °C (97.9 °F)   Resp 16   Ht 5' 4.5" (1.638 m)   Wt 67.6 kg (149 lb)   LMP 01/01/2000 (Approximate)   SpO2 99%   Breastfeeding No   BMI 25.18 kg/m²     "

## 2022-03-08 NOTE — PROVATION PATIENT INSTRUCTIONS
Discharge Summary/Instructions after an Endoscopic Procedure  Patient Name: Jyoti Tyler  Patient MRN: [id number]  Patient YOB: 1955 Tuesday, March 8, 2022  Phuc Patel MD  Dear patient,  As a result of recent federal legislation (The Federal Cures Act), you may   receive lab or pathology results from your procedure in your [facility]   account before your physician is able to contact you. Your physician or   their representative will relay the results to you with their   recommendations at their soonest availability.  Thank you,  RESTRICTIONS:  During your procedure today, you received medications for sedation.  These   medications may affect your judgment, balance and coordination.  Therefore,   for 24 hours, you have the following restrictions:   - DO NOT drive a car, operate machinery, make legal/financial decisions,   sign important papers or drink alcohol.    ACTIVITY:  Today: no heavy lifting, straining or running due to procedural   sedation/anesthesia.  The following day: return to full activity including work.  DIET:  Eat and drink normally unless instructed otherwise.     TREATMENT FOR COMMON SIDE EFFECTS:  - Mild abdominal pain, nausea, belching, bloating or excessive gas:  rest,   eat lightly and use a heating pad.  - Sore Throat: treat with throat lozenges and/or gargle with warm salt   water.  - Because air was used during the procedure, expelling large amounts of air   from your rectum or belching is normal.  - If a bowel prep was taken, you may not have a bowel movement for 1-3 days.    This is normal.  SYMPTOMS TO WATCH FOR AND REPORT TO YOUR PHYSICIAN:  1. Abdominal pain or bloating, other than gas cramps.  2. Chest pain.  3. Back pain.  4. Signs of infection such as: chills or fever occurring within 24 hours   after the procedure.  5. Rectal bleeding, which would show as bright red, maroon, or black stools.   (A tablespoon of blood from the rectum is not serious, especially if    hemorrhoids are present.)  6. Vomiting.  7. Weakness or dizziness.  GO DIRECTLY TO THE NEAREST EMERGENCY ROOM IF YOU HAVE ANY OF THE FOLLOWING:      Difficulty breathing              Chills and/or fever over 101 F   Persistent vomiting and/or vomiting blood   Severe abdominal pain   Severe chest pain   Black, tarry stools   Bleeding- more than one tablespoon   Any other symptom or condition that you feel may need urgent attention  Your doctor recommends these additional instructions:  If any biopsies were taken, your doctors clinic will contact you in 1 to 2   weeks with any results.  - Discharge patient to home (ambulatory).   - Patient has a contact number available for emergencies.  The signs and   symptoms of potential delayed complications were discussed with the   patient.  Return to normal activities tomorrow.  Written discharge   instructions were provided to the patient.   - Resume previous diet.   - Continue present medications.   - Await pathology results.   - Repeat colonoscopy in 7 years for surveillance based on pathology   results.  For questions, problems or results please call your physician - Phuc Patel MD at Work:  [phone].  [facility], EMERGENCY ROOM PHONE NUMBER: [phone]  IF A COMPLICATION OR EMERGENCY SITUATION ARISES AND YOU ARE UNABLE TO REACH   YOUR PHYSICIAN - GO DIRECTLY TO THE EMERGENCY ROOM.  Phuc Patel MD  3/8/2022 8:47:50 AM  This report has been verified and signed electronically.  Dear patient,  As a result of recent federal legislation (The Federal Cures Act), you may   receive lab or pathology results from your procedure in your [facility]   account before your physician is able to contact you. Your physician or   their representative will relay the results to you with their   recommendations at their soonest availability.  Thank you,  PROVATION   no 2021 11:03

## 2022-03-08 NOTE — H&P
COLONOSCOPY HISTORY AND PE    Procedure : Colonoscopy      INDICATIONS: asymptomatic screening exam      Past Medical History:   Diagnosis Date    Anisometropia     Cataract     HTN (hypertension) 2012    Hyperopia     OD    Hypertension     Osteoporosis screening        Past Surgical History:   Procedure Laterality Date    CATARACT EXTRACTION W/  INTRAOCULAR LENS IMPLANT Right 9/3/2020    Procedure: EXTRACTION, CATARACT, WITH IOL INSERTION;  Surgeon: Ambika Begum MD;  Location: [facility];  Service: Ophthalmology;  Laterality: Right;    CATARACT EXTRACTION W/  INTRAOCULAR LENS IMPLANT Left 2020    Procedure: EXTRACTION, CATARACT, WITH IOL INSERTION;  Surgeon: Ambika Begum MD;  Location: [facility];  Service: Ophthalmology;  Laterality: Left;    CHOLECYSTECTOMY      EYE SURGERY         Review of patient's allergies indicates:   Allergen Reactions    No known drug allergies        No current facility-administered medications on file prior to encounter.     Current Outpatient Medications on File Prior to Encounter   Medication Sig Dispense Refill    spironolactone-hydrochlorothiazide 25-25mg (ALDACTAZIDE) 25-25 mg Tab Take 1 tablet by mouth every morning. 90 tablet 1    fluticasone propionate (FLONASE) 50 mcg/actuation nasal spray 2 sprays (100 mcg total) by Each Nostril route once daily. 48 g 3       Family History   Problem Relation Age of Onset    Cataracts Mother     Macular degeneration Mother     Hypertension Mother     Cataracts Father     Hypertension Father     Heart attack Father     No Known Problems Sister     No Known Problems Son     Heart disease Paternal Uncle     No Known Problems Son        Social History     Socioeconomic History    Marital status:    Occupational History    Occupation: tour guide   Tobacco Use    Smoking status: Former Smoker     Packs/day: 0.25     Years: 10.00     Pack years: 2.50     Quit date: 1985     Years since quittin.1     Smokeless tobacco: Never Used   Substance and Sexual Activity    Alcohol use: Yes     Alcohol/week: 3.0 standard drinks     Types: 3 Glasses of wine per week     Comment: socially, glass of wine daily    Drug use: Never   Social History Narrative    March 2015    She got  on March 19    She is to smoke cigarettes but stopped when she was 29 years old    She took birth control pills while smoking    Happy in her employment as a              Social Determinants of Health     Financial Resource Strain: Low Risk     Difficulty of Paying Living Expenses: Not hard at all   Food Insecurity: No Food Insecurity    Worried About Running Out of Food in the Last Year: Never true    Ran Out of Food in the Last Year: Never true   Transportation Needs: No Transportation Needs    Lack of Transportation (Medical): No    Lack of Transportation (Non-Medical): No   Housing Stability: Unknown    Unable to Pay for Housing in the Last Year: No    Unstable Housing in the Last Year: No       Review of Systems -    Respiratory : no cough, shortness of breath, or wheezing  Cardiovascular  no chest pain or dyspnea on exertion  Gastrointestinal no abdominal pain, change in bowel habits, or black or bloody stools  Musculoskeletal no deformities, swelling  Neurological no TIA or stroke symptoms        Physical Exam:  General: NAD  AT NC EOMI  Mallampati Score   Neck supple, trachea midline  Lungs: nl excursions, no retractions.  Breathing comfortably  Abdomen ND soft NT.  No masses  Extremities: No CCE.      ASA:  II    PLAN  COLONOSCOPY.  The details of the procedure, the possible need for biopsy or polypectomy and the potential risks including bleeding, perforation, missed polyps were discussed in detail.

## 2022-03-15 LAB
FINAL PATHOLOGIC DIAGNOSIS: NORMAL
GROSS: NORMAL
Lab: NORMAL

## 2022-04-04 ENCOUNTER — PATIENT MESSAGE (OUTPATIENT)
Dept: ADMINISTRATIVE | Facility: OTHER | Age: 67
End: 2022-04-04
Payer: MEDICARE

## 2022-04-29 ENCOUNTER — PATIENT OUTREACH (OUTPATIENT)
Dept: ADMINISTRATIVE | Facility: HOSPITAL | Age: 67
End: 2022-04-29
Payer: MEDICARE

## 2022-04-29 NOTE — PROGRESS NOTES
Health Maintenance Due   Topic Date Due    TETANUS VACCINE  Never done    Influenza Vaccine (1) 09/01/2021    COVID-19 Vaccine (4 - Booster for Moderna series) 03/17/2022     Triggered LINKS. Updated Care Everywhere. Chart review completed as part of PHN attestation.

## 2022-06-20 ENCOUNTER — OFFICE VISIT (OUTPATIENT)
Dept: INTERNAL MEDICINE | Facility: CLINIC | Age: 67
End: 2022-06-20
Payer: MEDICARE

## 2022-06-20 VITALS
HEIGHT: 64 IN | SYSTOLIC BLOOD PRESSURE: 130 MMHG | HEART RATE: 80 BPM | DIASTOLIC BLOOD PRESSURE: 80 MMHG | BODY MASS INDEX: 25.21 KG/M2 | WEIGHT: 147.69 LBS | OXYGEN SATURATION: 97 %

## 2022-06-20 DIAGNOSIS — Z76.0 MEDICATION REFILL: Primary | ICD-10-CM

## 2022-06-20 DIAGNOSIS — L65.8 FEMALE PATTERN HAIR LOSS: ICD-10-CM

## 2022-06-20 DIAGNOSIS — I10 ESSENTIAL HYPERTENSION: ICD-10-CM

## 2022-06-20 PROBLEM — E66.9 CLASS 1 OBESITY IN ADULT: Status: RESOLVED | Noted: 2021-09-08 | Resolved: 2022-06-20

## 2022-06-20 PROBLEM — E66.811 CLASS 1 OBESITY IN ADULT: Status: RESOLVED | Noted: 2021-09-08 | Resolved: 2022-06-20

## 2022-06-20 PROCEDURE — 3008F BODY MASS INDEX DOCD: CPT | Mod: CPTII,S$GLB,, | Performed by: NURSE PRACTITIONER

## 2022-06-20 PROCEDURE — 3008F PR BODY MASS INDEX (BMI) DOCUMENTED: ICD-10-PCS | Mod: CPTII,S$GLB,, | Performed by: NURSE PRACTITIONER

## 2022-06-20 PROCEDURE — 1160F RVW MEDS BY RX/DR IN RCRD: CPT | Mod: CPTII,S$GLB,, | Performed by: NURSE PRACTITIONER

## 2022-06-20 PROCEDURE — 99999 PR PBB SHADOW E&M-EST. PATIENT-LVL IV: CPT | Mod: PBBFAC,,, | Performed by: NURSE PRACTITIONER

## 2022-06-20 PROCEDURE — 1126F AMNT PAIN NOTED NONE PRSNT: CPT | Mod: CPTII,S$GLB,, | Performed by: NURSE PRACTITIONER

## 2022-06-20 PROCEDURE — 99499 UNLISTED E&M SERVICE: CPT | Mod: S$GLB,,, | Performed by: NURSE PRACTITIONER

## 2022-06-20 PROCEDURE — 3288F PR FALLS RISK ASSESSMENT DOCUMENTED: ICD-10-PCS | Mod: CPTII,S$GLB,, | Performed by: NURSE PRACTITIONER

## 2022-06-20 PROCEDURE — 99214 OFFICE O/P EST MOD 30 MIN: CPT | Mod: S$GLB,,, | Performed by: NURSE PRACTITIONER

## 2022-06-20 PROCEDURE — 3079F PR MOST RECENT DIASTOLIC BLOOD PRESSURE 80-89 MM HG: ICD-10-PCS | Mod: CPTII,S$GLB,, | Performed by: NURSE PRACTITIONER

## 2022-06-20 PROCEDURE — 1101F PT FALLS ASSESS-DOCD LE1/YR: CPT | Mod: CPTII,S$GLB,, | Performed by: NURSE PRACTITIONER

## 2022-06-20 PROCEDURE — 3075F SYST BP GE 130 - 139MM HG: CPT | Mod: CPTII,S$GLB,, | Performed by: NURSE PRACTITIONER

## 2022-06-20 PROCEDURE — 3075F PR MOST RECENT SYSTOLIC BLOOD PRESS GE 130-139MM HG: ICD-10-PCS | Mod: CPTII,S$GLB,, | Performed by: NURSE PRACTITIONER

## 2022-06-20 PROCEDURE — 1160F PR REVIEW ALL MEDS BY PRESCRIBER/CLIN PHARMACIST DOCUMENTED: ICD-10-PCS | Mod: CPTII,S$GLB,, | Performed by: NURSE PRACTITIONER

## 2022-06-20 PROCEDURE — 99999 PR PBB SHADOW E&M-EST. PATIENT-LVL IV: ICD-10-PCS | Mod: PBBFAC,,, | Performed by: NURSE PRACTITIONER

## 2022-06-20 PROCEDURE — 1101F PR PT FALLS ASSESS DOC 0-1 FALLS W/OUT INJ PAST YR: ICD-10-PCS | Mod: CPTII,S$GLB,, | Performed by: NURSE PRACTITIONER

## 2022-06-20 PROCEDURE — 3288F FALL RISK ASSESSMENT DOCD: CPT | Mod: CPTII,S$GLB,, | Performed by: NURSE PRACTITIONER

## 2022-06-20 PROCEDURE — 3079F DIAST BP 80-89 MM HG: CPT | Mod: CPTII,S$GLB,, | Performed by: NURSE PRACTITIONER

## 2022-06-20 PROCEDURE — 1126F PR PAIN SEVERITY QUANTIFIED, NO PAIN PRESENT: ICD-10-PCS | Mod: CPTII,S$GLB,, | Performed by: NURSE PRACTITIONER

## 2022-06-20 PROCEDURE — 1159F MED LIST DOCD IN RCRD: CPT | Mod: CPTII,S$GLB,, | Performed by: NURSE PRACTITIONER

## 2022-06-20 PROCEDURE — 99214 PR OFFICE/OUTPT VISIT, EST, LEVL IV, 30-39 MIN: ICD-10-PCS | Mod: S$GLB,,, | Performed by: NURSE PRACTITIONER

## 2022-06-20 PROCEDURE — 99499 RISK ADDL DX/OHS AUDIT: ICD-10-PCS | Mod: S$GLB,,, | Performed by: NURSE PRACTITIONER

## 2022-06-20 PROCEDURE — 1159F PR MEDICATION LIST DOCUMENTED IN MEDICAL RECORD: ICD-10-PCS | Mod: CPTII,S$GLB,, | Performed by: NURSE PRACTITIONER

## 2022-06-20 RX ORDER — MINOXIDIL 2.5 MG/1
TABLET ORAL
COMMUNITY
Start: 2022-04-28 | End: 2023-10-31 | Stop reason: SDUPTHER

## 2022-06-20 RX ORDER — SPIRONOLACTONE AND HYDROCHLOROTHIAZIDE 25; 25 MG/1; MG/1
1 TABLET ORAL DAILY
Qty: 90 TABLET | Refills: 0 | Status: SHIPPED | OUTPATIENT
Start: 2022-06-20 | End: 2022-09-22 | Stop reason: SDUPTHER

## 2022-06-20 NOTE — PROGRESS NOTES
INTERNAL MEDICINE PROGRESS/URGENT CARE NOTE    CHIEF COMPLAINT     Chief Complaint   Patient presents with    Hypertension    Follow-up    Medication Refill       HPI     Jyoti Tyler is a 67 y.o. female who presents for an urgent/follow up visit today. She is a current patient of Dr. Judy Eaton, last seen in clinic on 01/04/2022 by CARLOS Weinberg for Medicare AWV exam.     She presents today for blood pressure medication refill. She states she ran out of her medication 3 days ago. She denies HA, CP, dizziness.     Needs new PCP.     She sees outside dermatology (04/28/2022) for female pattern hair loss- was recently prescribed minoxidil oral 2.5 daily- she cannot use the topical minoxidil due to skin reaction (scalp itching). Was told to talk to PCP about increasing the dose to 5 mg daily d/t ADR of hypotension.     Past Medical History:  Past Medical History:   Diagnosis Date    Anisometropia     Cataract     HTN (hypertension) 11/7/2012    Hyperopia     OD    Hypertension     Osteoporosis screening        Home Medications:  Prior to Admission medications    Medication Sig Start Date End Date Taking? Authorizing Provider   famotidine (PEPCID) 10 MG tablet Take 10 mg by mouth once daily.    Historical Provider   fluticasone propionate (FLONASE) 50 mcg/actuation nasal spray 2 sprays (100 mcg total) by Each Nostril route once daily. 7/8/20   Judy Eaton MD   minoxidiL (ROGAINE) 2 % external solution Apply 1 application topically once daily.    Historical Provider   semaglutide, weight loss, (WEGOVY) 0.5 mg/0.5 mL PnIj Inject 1 application into the skin once a week. 12/1/21   MD maddy Pateglutide, weight loss, (WEGOVY) 1 mg/0.5 mL PnIj Inject 1 mg into the skin once a week. WEEK 9 TO WEEK 12 9/10/21   MD maddy Pateglutide, weight loss, (WEGOVY) 1 mg/0.5 mL PnIj Inject 1 mg into the skin once a week. 1/5/22   Judy Eaton MD   semaglutide, weight loss, (WEGOVY)  "1.7 mg/0.75 mL PnIj Inject 1.7 mg into the skin once a week. WEEK 13 TO WEEK 16 9/10/21   Judy Eaton MD   semaglutide, weight loss, (WEGOVY) 2.4 mg/0.75 mL PnIj Inject 2.4 mg into the skin once a week. TO CONTINUE ONCE WEEKLY STARTING WEEK 17 9/10/21   Judy Eaton MD   spironolactone-hydrochlorothiazide 25-25mg (ALDACTAZIDE) 25-25 mg Tab TAKE 1 TABLET BY MOUTH EVERY DAY IN THE MORNING 3/14/22   Carmen Edwards NP       Review of Systems:  Review of Systems   Constitutional: Negative for chills, fever and unexpected weight change.   Eyes: Negative for visual disturbance.   Cardiovascular: Negative for chest pain, palpitations and leg swelling.   Gastrointestinal: Negative for diarrhea, nausea and vomiting.   Neurological: Negative for dizziness and light-headedness.       Health Maintainence:   Immunizations:  Health Maintenance       Date Due Completion Date    TETANUS VACCINE Never done ---    DEXA Scan 06/21/2022 6/21/2019    Influenza Vaccine (Season Ended) 09/01/2022 10/30/2009    Mammogram 09/09/2022 9/9/2021    Lipid Panel 09/09/2026 9/9/2021    Colorectal Cancer Screening 03/08/2029 3/8/2022           PHYSICAL EXAM     /80   Pulse 80   Ht 5' 4" (1.626 m)   Wt 67 kg (147 lb 11.3 oz)   LMP 01/01/2000 (Approximate)   SpO2 97%   BMI 25.35 kg/m²     Physical Exam  Vitals and nursing note reviewed.   Constitutional:       General: She is not in acute distress.     Appearance: Normal appearance. She is not toxic-appearing.   HENT:      Head: Normocephalic and atraumatic.      Right Ear: External ear normal.      Left Ear: External ear normal.   Eyes:      General: No scleral icterus.     Extraocular Movements: Extraocular movements intact.      Conjunctiva/sclera: Conjunctivae normal.   Cardiovascular:      Rate and Rhythm: Normal rate and regular rhythm.      Heart sounds: Normal heart sounds. No murmur heard.  Pulmonary:      Effort: Pulmonary effort is normal.      Breath sounds: " Normal breath sounds. No wheezing, rhonchi or rales.   Musculoskeletal:         General: Normal range of motion.   Skin:     General: Skin is warm and dry.   Neurological:      General: No focal deficit present.      Mental Status: She is alert and oriented to person, place, and time.      Coordination: Coordination normal.      Gait: Gait normal.   Psychiatric:         Mood and Affect: Mood normal.         Behavior: Behavior normal.         LABS     No results found for: LABA1C, HGBA1C  CMP  Sodium   Date Value Ref Range Status   09/09/2021 142 136 - 145 mmol/L Final     Potassium   Date Value Ref Range Status   09/09/2021 4.7 3.5 - 5.1 mmol/L Final     Chloride   Date Value Ref Range Status   09/09/2021 108 95 - 110 mmol/L Final     CO2   Date Value Ref Range Status   09/09/2021 26 23 - 29 mmol/L Final     Glucose   Date Value Ref Range Status   09/09/2021 96 70 - 110 mg/dL Final     BUN   Date Value Ref Range Status   09/09/2021 15 8 - 23 mg/dL Final     Creatinine   Date Value Ref Range Status   09/09/2021 0.8 0.5 - 1.4 mg/dL Final     Calcium   Date Value Ref Range Status   09/09/2021 9.7 8.7 - 10.5 mg/dL Final     Total Protein   Date Value Ref Range Status   09/09/2021 6.7 6.0 - 8.4 g/dL Final     Albumin   Date Value Ref Range Status   09/09/2021 3.8 3.5 - 5.2 g/dL Final     Total Bilirubin   Date Value Ref Range Status   09/09/2021 0.5 0.1 - 1.0 mg/dL Final     Comment:     For infants and newborns, interpretation of results should be based  on gestational age, weight and in agreement with clinical  observations.    Premature Infant recommended reference ranges:  Up to 24 hours.............<8.0 mg/dL  Up to 48 hours............<12.0 mg/dL  3-5 days..................<15.0 mg/dL  6-29 days.................<15.0 mg/dL       Alkaline Phosphatase   Date Value Ref Range Status   09/09/2021 89 55 - 135 U/L Final     AST   Date Value Ref Range Status   09/09/2021 18 10 - 40 U/L Final     ALT   Date Value Ref Range  Status   09/09/2021 20 10 - 44 U/L Final     Anion Gap   Date Value Ref Range Status   09/09/2021 8 8 - 16 mmol/L Final     eGFR if    Date Value Ref Range Status   09/09/2021 >60.0 >60 mL/min/1.73 m^2 Final     eGFR if non    Date Value Ref Range Status   09/09/2021 >60.0 >60 mL/min/1.73 m^2 Final     Comment:     Calculation used to obtain the estimated glomerular filtration  rate (eGFR) is the CKD-EPI equation.        Lab Results   Component Value Date    WBC 7.64 09/09/2021    HGB 15.4 09/09/2021    HCT 46.9 09/09/2021    MCV 88 09/09/2021     09/09/2021     Lab Results   Component Value Date    CHOL 210 (H) 09/09/2021    CHOL 199 06/20/2019    CHOL 257 (H) 07/12/2017     Lab Results   Component Value Date    HDL 44 09/09/2021    HDL 51 06/20/2019    HDL 61 07/12/2017     Lab Results   Component Value Date    LDLCALC 140.2 09/09/2021    LDLCALC 119.2 06/20/2019    LDLCALC 168.4 (H) 07/12/2017     Lab Results   Component Value Date    TRIG 129 09/09/2021    TRIG 144 06/20/2019    TRIG 138 07/12/2017     Lab Results   Component Value Date    CHOLHDL 21.0 09/09/2021    CHOLHDL 25.6 06/20/2019    CHOLHDL 23.7 07/12/2017     Lab Results   Component Value Date    TSH 3.799 06/20/2019       ASSESSMENT/PLAN     Jyoti was seen today for hypertension, follow-up and medication refill.    Diagnoses and all orders for this visit:    Medication refill  -     spironolactone-hydrochlorothiazide 25-25mg (ALDACTAZIDE) 25-25 mg Tab; Take 1 tablet by mouth once daily.    Essential hypertension  -     spironolactone-hydrochlorothiazide 25-25mg (ALDACTAZIDE) 25-25 mg Tab; Take 1 tablet by mouth once daily.    Female pattern hair loss    90 day supply of aldactazide sent to pharmacy. Advised to monitor home blood pressure daily, particularly if she increases her dose of minoxidil 2.5 mg to 5 mg daily. Discussed PCPs accepting new patients in this clinic to establish care/annual exam 09/2022.        Follow up with PCP in September 2022 to establish care/annual exam.    Patient education provided from Lexy. Patient was counseled on when and how to seek emergent care.          Department of Internal Medicine - [facility]  2:38 PM

## 2022-09-30 ENCOUNTER — TELEPHONE (OUTPATIENT)
Dept: INTERNAL MEDICINE | Facility: CLINIC | Age: 67
End: 2022-09-30
Payer: MEDICARE

## 2022-09-30 DIAGNOSIS — Z12.31 ENCOUNTER FOR SCREENING MAMMOGRAM FOR MALIGNANT NEOPLASM OF BREAST: Primary | ICD-10-CM

## 2022-09-30 NOTE — TELEPHONE ENCOUNTER
Pt requesting mammogram     Was a pt of [facility]  Last office visit 6/20/22 with BILL Phelps , NP      Please advise

## 2022-09-30 NOTE — TELEPHONE ENCOUNTER
----- Message from Linnea Ricardo sent at 9/30/2022  9:02 AM CDT -----  Regarding: orders  Contact: [phone]  Pt transferred from Dr Robin but needs orders put in for mammogram. Pt has access to portal. pls call

## 2022-10-31 NOTE — ANESTHESIA POSTPROCEDURE EVALUATION
Anesthesia Post Evaluation    Patient: Jyoti Tyler    Procedure(s) Performed: Procedure(s) (LRB):  COLONOSCOPY (N/A)    Final Anesthesia Type: general      Patient location during evaluation: PACU  Patient participation: Yes- Able to Participate  Level of consciousness: awake and alert, awake and oriented  Post-procedure vital signs: reviewed and stable  Pain management: adequate  Airway patency: patent    PONV status at discharge: No PONV  Anesthetic complications: no      Cardiovascular status: blood pressure returned to baseline, stable and hemodynamically stable  Respiratory status: unassisted, spontaneous ventilation and room air  Hydration status: euvolemic  Follow-up not needed.          Vitals Value Taken Time   /60 03/08/22 0919   Temp 36.6 °C (97.9 °F) 03/08/22 0850   Pulse 72 03/08/22 0919   Resp 16 03/08/22 0919   SpO2 99 % 03/08/22 0919         Event Time   Out of Recovery 09:20:40         Pain/Balbina Score: Balbina Score: 10 (3/8/2022  8:50 AM)        
Diet, Regular:   Consistent Carbohydrate {Evening Snacks}  Supplement Feeding Modality:  Oral  Glucerna Shake Cans or Servings Per Day:  1       Frequency:  Two Times a day (10-31-22 @ 16:14)

## 2022-11-23 ENCOUNTER — OFFICE VISIT (OUTPATIENT)
Dept: INTERNAL MEDICINE | Facility: CLINIC | Age: 67
End: 2022-11-23
Payer: MEDICARE

## 2022-11-23 VITALS
SYSTOLIC BLOOD PRESSURE: 112 MMHG | WEIGHT: 144.5 LBS | DIASTOLIC BLOOD PRESSURE: 68 MMHG | OXYGEN SATURATION: 99 % | HEART RATE: 77 BPM | HEIGHT: 64 IN | BODY MASS INDEX: 24.67 KG/M2

## 2022-11-23 DIAGNOSIS — Z01.419 ENCOUNTER FOR WELL WOMAN EXAM: ICD-10-CM

## 2022-11-23 DIAGNOSIS — M85.852 OSTEOPENIA OF NECKS OF BOTH FEMURS: ICD-10-CM

## 2022-11-23 DIAGNOSIS — I10 ESSENTIAL HYPERTENSION: ICD-10-CM

## 2022-11-23 DIAGNOSIS — Z51.81 ENCOUNTER FOR MEDICATION MONITORING: ICD-10-CM

## 2022-11-23 DIAGNOSIS — I77.9 MILD CAROTID ARTERY DISEASE: ICD-10-CM

## 2022-11-23 DIAGNOSIS — Z00.00 VISIT FOR ANNUAL HEALTH EXAMINATION: Primary | ICD-10-CM

## 2022-11-23 DIAGNOSIS — E55.9 VITAMIN D DEFICIENCY: ICD-10-CM

## 2022-11-23 DIAGNOSIS — M85.851 OSTEOPENIA OF NECKS OF BOTH FEMURS: ICD-10-CM

## 2022-11-23 DIAGNOSIS — J30.9 CHRONIC ALLERGIC RHINITIS: ICD-10-CM
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
## 2022-11-23 DIAGNOSIS — E78.2 MIXED HYPERLIPIDEMIA: ICD-10-CM

## 2022-11-23 PROCEDURE — 99499 UNLISTED E&M SERVICE: CPT | Mod: S$GLB,,, | Performed by: INTERNAL MEDICINE

## 2022-11-23 PROCEDURE — 1101F PR PT FALLS ASSESS DOC 0-1 FALLS W/OUT INJ PAST YR: ICD-10-PCS | Mod: CPTII,S$GLB,, | Performed by: INTERNAL MEDICINE

## 2022-11-23 PROCEDURE — 3008F PR BODY MASS INDEX (BMI) DOCUMENTED: ICD-10-PCS | Mod: CPTII,S$GLB,, | Performed by: INTERNAL MEDICINE

## 2022-11-23 PROCEDURE — 1160F PR REVIEW ALL MEDS BY PRESCRIBER/CLIN PHARMACIST DOCUMENTED: ICD-10-PCS | Mod: CPTII,S$GLB,, | Performed by: INTERNAL MEDICINE

## 2022-11-23 PROCEDURE — 1160F RVW MEDS BY RX/DR IN RCRD: CPT | Mod: CPTII,S$GLB,, | Performed by: INTERNAL MEDICINE

## 2022-11-23 PROCEDURE — 3008F BODY MASS INDEX DOCD: CPT | Mod: CPTII,S$GLB,, | Performed by: INTERNAL MEDICINE

## 2022-11-23 PROCEDURE — 1101F PT FALLS ASSESS-DOCD LE1/YR: CPT | Mod: CPTII,S$GLB,, | Performed by: INTERNAL MEDICINE

## 2022-11-23 PROCEDURE — 3078F PR MOST RECENT DIASTOLIC BLOOD PRESSURE < 80 MM HG: ICD-10-PCS | Mod: CPTII,S$GLB,, | Performed by: INTERNAL MEDICINE

## 2022-11-23 PROCEDURE — 3288F PR FALLS RISK ASSESSMENT DOCUMENTED: ICD-10-PCS | Mod: CPTII,S$GLB,, | Performed by: INTERNAL MEDICINE

## 2022-11-23 PROCEDURE — 1159F PR MEDICATION LIST DOCUMENTED IN MEDICAL RECORD: ICD-10-PCS | Mod: CPTII,S$GLB,, | Performed by: INTERNAL MEDICINE

## 2022-11-23 PROCEDURE — 1159F MED LIST DOCD IN RCRD: CPT | Mod: CPTII,S$GLB,, | Performed by: INTERNAL MEDICINE

## 2022-11-23 PROCEDURE — 3074F PR MOST RECENT SYSTOLIC BLOOD PRESSURE < 130 MM HG: ICD-10-PCS | Mod: CPTII,S$GLB,, | Performed by: INTERNAL MEDICINE

## 2022-11-23 PROCEDURE — 3074F SYST BP LT 130 MM HG: CPT | Mod: CPTII,S$GLB,, | Performed by: INTERNAL MEDICINE

## 2022-11-23 PROCEDURE — 99999 PR PBB SHADOW E&M-EST. PATIENT-LVL IV: ICD-10-PCS | Mod: PBBFAC,,, | Performed by: INTERNAL MEDICINE

## 2022-11-23 PROCEDURE — 1126F PR PAIN SEVERITY QUANTIFIED, NO PAIN PRESENT: ICD-10-PCS | Mod: CPTII,S$GLB,, | Performed by: INTERNAL MEDICINE

## 2022-11-23 PROCEDURE — 1126F AMNT PAIN NOTED NONE PRSNT: CPT | Mod: CPTII,S$GLB,, | Performed by: INTERNAL MEDICINE

## 2022-11-23 PROCEDURE — 99397 PR PREVENTIVE VISIT,EST,65 & OVER: ICD-10-PCS | Mod: GZ,S$GLB,, | Performed by: INTERNAL MEDICINE

## 2022-11-23 PROCEDURE — 3078F DIAST BP <80 MM HG: CPT | Mod: CPTII,S$GLB,, | Performed by: INTERNAL MEDICINE

## 2022-11-23 PROCEDURE — 99999 PR PBB SHADOW E&M-EST. PATIENT-LVL IV: CPT | Mod: PBBFAC,,, | Performed by: INTERNAL MEDICINE

## 2022-11-23 PROCEDURE — 99499 RISK ADDL DX/OHS AUDIT: ICD-10-PCS | Mod: S$GLB,,, | Performed by: INTERNAL MEDICINE

## 2022-11-23 PROCEDURE — 99397 PER PM REEVAL EST PAT 65+ YR: CPT | Mod: GZ,S$GLB,, | Performed by: INTERNAL MEDICINE

## 2022-11-23 PROCEDURE — 3288F FALL RISK ASSESSMENT DOCD: CPT | Mod: CPTII,S$GLB,, | Performed by: INTERNAL MEDICINE

## 2022-11-23 RX ORDER — SPIRONOLACTONE AND HYDROCHLOROTHIAZIDE 25; 25 MG/1; MG/1
1 TABLET ORAL DAILY
Qty: 90 TABLET | Refills: 3 | Status: SHIPPED | OUTPATIENT
Start: 2022-11-23 | End: 2023-11-20 | Stop reason: SDUPTHER

## 2022-11-23 NOTE — PROGRESS NOTES
"INTERNAL MEDICINE INITIAL VISIT NOTE      CHIEF COMPLAINT     Chief Complaint   Patient presents with    Annual Exam    Establish Care       HPI     Jyoti Tyler is a 67 y.o. female with allergies, carotid artery disease, HTN, osteopenia, here today for annual. PCP listed as Judy Robin MD.     Started on Wegovy for weight loss one year ago. Lost 30lbs since 9/2021. Last dose of medication 8 weeks ago; unable to take consistently since July 2022 due to lack of refills.     One mechanical fall 2 weeks ago. Tripped over step in friend's house. Fell onto L side, some bruising on L arm and soreness below L rib. Improving.     Past Medical History:  Past Medical History:   Diagnosis Date    Anisometropia     Cataract     HTN (hypertension) 11/7/2012    Hyperopia     OD    Hypertension     Osteoporosis screening        Review of Systems:  Review of Systems   Constitutional:  Negative for chills and fever.   HENT:  Negative for congestion.    Respiratory:  Negative for cough and shortness of breath.    Cardiovascular:  Negative for chest pain.   Gastrointestinal:  Negative for constipation, nausea and vomiting.   Genitourinary:  Negative for hematuria and urgency.   Musculoskeletal:  Negative for falls.   Skin:  Negative for rash.   Neurological:  Negative for dizziness and loss of consciousness.     Health Maintenance:   Immunizations:   Influenza - complete  Tdap - next visit  Covid 19 - complete  HPV  Prevnar rec at 64 - 6/2019; Pneumovax 7/2020  Shingrix rec at 49 - complete    Cancer Screening:  PAP: no hx of recent abnl Pap  Mammogram:  scheduled  Colonoscopy:  3/2022; repeat 3/2029  DEXA:  order      PHYSICAL EXAM     /68 (BP Location: Left arm, Patient Position: Sitting, BP Method: Medium (Manual))   Pulse 77   Ht 5' 4" (1.626 m)   Wt 65.5 kg (144 lb 8.2 oz)   LMP 01/01/2000 (Approximate)   SpO2 99%   BMI 24.81 kg/m²     GEN - A+OX4, NAD   HEENT - PERRL, EOMI,   Neck - No thyromegaly or " thyroid masses felt.  No cervical lymphadenopathy appreciated.  CV - RRR, no m/r/g  Chest - CTAB, no wheezing, crackles, or rhonchi  Abd - S/NT/ND/+BS.   Ext - 2+BDP. No C/C/E.  Skin - Normal color and texture, no rash, no skin lesions.    ASSESSMENT/PLAN     Jyoti Tyler is a 67 y.o. female with conditions as above.     Visit for annual health examination  Labs ordered  -     CBC Auto Differential; Future; Expected date: 11/23/2022  -     Comprehensive Metabolic Panel; Future; Expected date: 11/23/2022  -     Lipid Panel; Future; Expected date: 11/23/2022  -     Vitamin D; Future; Expected date: 11/23/2022    Essential hypertension  Controlled, continue current regimen  -     spironolactone-hydrochlorothiazide 25-25mg (ALDACTAZIDE) 25-25 mg Tab; Take 1 tablet by mouth once daily.  Dispense: 90 tablet; Refill: 3  -     CBC Auto Differential; Future; Expected date: 11/23/2022  -     Comprehensive Metabolic Panel; Future; Expected date: 11/23/2022    Mild carotid artery disease  Asymptomatic, monitor    Vitamin D deficiency  Supplement  -     Vitamin D; Future; Expected date: 11/23/2022    Chronic allergic rhinitis    Mixed hyperlipidemia  -     Lipid Panel; Future; Expected date: 11/23/2022    Osteopenia of necks of both femurs  Continue supplement, exercise  -     DXA Bone Density Spine And Hip; Future; Expected date: 11/23/2022    Fall, subsequent encounter  Mechanical; advised on fall precautions    Encounter for medication monitoring  Discussed continuing low dose Ozempic vs maintaining weight via lifestyle modifications  Continue low dose Ozempic per pt preference  -     semaglutide (OZEMPIC) 0.25 mg or 0.5 mg(2 mg/1.5 mL) pen injector; Inject 0.5 mg into the skin every 7 days.  Dispense: 1 pen; Refill: 11    Encounter for well woman exam  -     Ambulatory referral/consult to Gynecology; Future; Expected date: 11/30/2022     HM as above.    Viridiana Steiner MD  Department of Internal Medicine - [facility]  11/23/2022

## 2022-11-23 NOTE — PATIENT INSTRUCTIONS
Take Magnesium supplement 250mg at night. If tolerated, can increase to 400-500mg.     Take Vitamin D 2000iu daily.     Schedule dexa scan and gynecology appointment.     Gynecology:    Dr. JERRY Zelaya

## 2022-11-29 ENCOUNTER — LAB VISIT (OUTPATIENT)
Dept: LAB | Facility: HOSPITAL | Age: 67
End: 2022-11-29
Payer: MEDICARE

## 2022-11-29 DIAGNOSIS — Z00.00 VISIT FOR ANNUAL HEALTH EXAMINATION: ICD-10-CM

## 2022-11-29 DIAGNOSIS — E55.9 VITAMIN D DEFICIENCY: ICD-10-CM

## 2022-11-29 DIAGNOSIS — I10 ESSENTIAL HYPERTENSION: ICD-10-CM

## 2022-11-29 DIAGNOSIS — E78.2 MIXED HYPERLIPIDEMIA: ICD-10-CM

## 2022-11-29 LAB
25(OH)D3+25(OH)D2 SERPL-MCNC: 24 NG/ML (ref 30–96)
ALBUMIN SERPL BCP-MCNC: 4.1 G/DL (ref 3.5–5.2)
ALP SERPL-CCNC: 86 U/L (ref 55–135)
ALT SERPL W/O P-5'-P-CCNC: 17 U/L (ref 10–44)
ANION GAP SERPL CALC-SCNC: 10 MMOL/L (ref 8–16)
AST SERPL-CCNC: 19 U/L (ref 10–40)
BASOPHILS # BLD AUTO: 0.05 K/UL (ref 0–0.2)
BASOPHILS NFR BLD: 0.7 % (ref 0–1.9)
BILIRUB SERPL-MCNC: 1 MG/DL (ref 0.1–1)
BUN SERPL-MCNC: 18 MG/DL (ref 8–23)
CALCIUM SERPL-MCNC: 9.7 MG/DL (ref 8.7–10.5)
CHLORIDE SERPL-SCNC: 98 MMOL/L (ref 95–110)
CHOLEST SERPL-MCNC: 207 MG/DL (ref 120–199)
CHOLEST/HDLC SERPL: 3.7 {RATIO} (ref 2–5)
CO2 SERPL-SCNC: 30 MMOL/L (ref 23–29)
CREAT SERPL-MCNC: 0.8 MG/DL (ref 0.5–1.4)
DIFFERENTIAL METHOD: ABNORMAL
EOSINOPHIL # BLD AUTO: 0.1 K/UL (ref 0–0.5)
EOSINOPHIL NFR BLD: 1.5 % (ref 0–8)
ERYTHROCYTE [DISTWIDTH] IN BLOOD BY AUTOMATED COUNT: 13.5 % (ref 11.5–14.5)
EST. GFR  (NO RACE VARIABLE): >60 ML/MIN/1.73 M^2
GLUCOSE SERPL-MCNC: 101 MG/DL (ref 70–110)
HCT VFR BLD AUTO: 45.6 % (ref 37–48.5)
HDLC SERPL-MCNC: 56 MG/DL (ref 40–75)
HDLC SERPL: 27.1 % (ref 20–50)
HGB BLD-MCNC: 15.5 G/DL (ref 12–16)
IMM GRANULOCYTES # BLD AUTO: 0.02 K/UL (ref 0–0.04)
IMM GRANULOCYTES NFR BLD AUTO: 0.3 % (ref 0–0.5)
LDLC SERPL CALC-MCNC: 132.8 MG/DL (ref 63–159)
LYMPHOCYTES # BLD AUTO: 2.3 K/UL (ref 1–4.8)
LYMPHOCYTES NFR BLD: 31.5 % (ref 18–48)
MCH RBC QN AUTO: 29.1 PG (ref 27–31)
MCHC RBC AUTO-ENTMCNC: 34 G/DL (ref 32–36)
MCV RBC AUTO: 86 FL (ref 82–98)
MONOCYTES # BLD AUTO: 0.6 K/UL (ref 0.3–1)
MONOCYTES NFR BLD: 7.7 % (ref 4–15)
NEUTROPHILS # BLD AUTO: 4.3 K/UL (ref 1.8–7.7)
NEUTROPHILS NFR BLD: 58.3 % (ref 38–73)
NONHDLC SERPL-MCNC: 151 MG/DL
NRBC BLD-RTO: 0 /100 WBC
PLATELET # BLD AUTO: 282 K/UL (ref 150–450)
PMV BLD AUTO: 8.7 FL (ref 9.2–12.9)
POTASSIUM SERPL-SCNC: 3.7 MMOL/L (ref 3.5–5.1)
PROT SERPL-MCNC: 6.6 G/DL (ref 6–8.4)
RBC # BLD AUTO: 5.32 M/UL (ref 4–5.4)
SODIUM SERPL-SCNC: 138 MMOL/L (ref 136–145)
TRIGL SERPL-MCNC: 91 MG/DL (ref 30–150)
WBC # BLD AUTO: 7.42 K/UL (ref 3.9–12.7)

## 2022-11-29 PROCEDURE — 36415 COLL VENOUS BLD VENIPUNCTURE: CPT | Performed by: INTERNAL MEDICINE

## 2022-11-29 PROCEDURE — 82306 VITAMIN D 25 HYDROXY: CPT | Performed by: INTERNAL MEDICINE

## 2022-11-29 PROCEDURE — 80061 LIPID PANEL: CPT | Performed by: INTERNAL MEDICINE

## 2022-11-29 PROCEDURE — 85025 COMPLETE CBC W/AUTO DIFF WBC: CPT | Performed by: INTERNAL MEDICINE

## 2022-11-29 PROCEDURE — 80053 COMPREHEN METABOLIC PANEL: CPT | Performed by: INTERNAL MEDICINE

## 2022-12-05 ENCOUNTER — IMMUNIZATION (OUTPATIENT)
Dept: INTERNAL MEDICINE | Facility: CLINIC | Age: 67
End: 2022-12-05
Payer: MEDICARE

## 2022-12-05 ENCOUNTER — HOSPITAL ENCOUNTER (OUTPATIENT)
Dept: RADIOLOGY | Facility: HOSPITAL | Age: 67
Discharge: HOME | End: 2022-12-05
Attending: INTERNAL MEDICINE
Payer: MEDICARE

## 2022-12-05 DIAGNOSIS — Z12.31 ENCOUNTER FOR SCREENING MAMMOGRAM FOR MALIGNANT NEOPLASM OF BREAST: ICD-10-CM

## 2022-12-05 DIAGNOSIS — Z23 NEED FOR VACCINATION: Primary | ICD-10-CM

## 2022-12-05 PROCEDURE — 77063 BREAST TOMOSYNTHESIS BI: CPT | Mod: TC

## 2022-12-05 PROCEDURE — 77063 BREAST TOMOSYNTHESIS BI: CPT | Mod: 26,,, | Performed by: RADIOLOGY

## 2022-12-05 PROCEDURE — 77067 SCR MAMMO BI INCL CAD: CPT | Mod: 26,,, | Performed by: RADIOLOGY

## 2022-12-05 PROCEDURE — 91312 COVID-19, MRNA, LNP-S, BIVALENT BOOSTER, PF, 30 MCG/0.3 ML DOSE: CPT | Mod: S$GLB,,, | Performed by: INTERNAL MEDICINE

## 2022-12-05 PROCEDURE — 0124A COVID-19, MRNA, LNP-S, BIVALENT BOOSTER, PF, 30 MCG/0.3 ML DOSE: CPT | Mod: CV19,PBBFAC | Performed by: INTERNAL MEDICINE

## 2022-12-05 PROCEDURE — 77063 MAMMO DIGITAL SCREENING BILAT WITH TOMO: ICD-10-PCS | Mod: 26,,, | Performed by: RADIOLOGY

## 2022-12-05 PROCEDURE — 91312 COVID-19, MRNA, LNP-S, BIVALENT BOOSTER, PF, 30 MCG/0.3 ML DOSE: ICD-10-PCS | Mod: S$GLB,,, | Performed by: INTERNAL MEDICINE

## 2022-12-05 PROCEDURE — 77067 MAMMO DIGITAL SCREENING BILAT WITH TOMO: ICD-10-PCS | Mod: 26,,, | Performed by: RADIOLOGY

## 2022-12-30 ENCOUNTER — OFFICE VISIT (OUTPATIENT)
Dept: OBSTETRICS AND GYNECOLOGY | Facility: CLINIC | Age: 67
End: 2022-12-30
Payer: MEDICARE

## 2022-12-30 VITALS
HEIGHT: 64 IN | DIASTOLIC BLOOD PRESSURE: 64 MMHG | WEIGHT: 147.06 LBS | BODY MASS INDEX: 25.11 KG/M2 | SYSTOLIC BLOOD PRESSURE: 116 MMHG

## 2022-12-30 DIAGNOSIS — Z01.419 ENCOUNTER FOR WELL WOMAN EXAM: ICD-10-CM

## 2022-12-30 PROCEDURE — G0101 CA SCREEN;PELVIC/BREAST EXAM: HCPCS | Mod: S$GLB,,, | Performed by: STUDENT IN AN ORGANIZED HEALTH CARE EDUCATION/TRAINING PROGRAM

## 2022-12-30 PROCEDURE — 3008F PR BODY MASS INDEX (BMI) DOCUMENTED: ICD-10-PCS | Mod: CPTII,S$GLB,, | Performed by: STUDENT IN AN ORGANIZED HEALTH CARE EDUCATION/TRAINING PROGRAM

## 2022-12-30 PROCEDURE — 3074F PR MOST RECENT SYSTOLIC BLOOD PRESSURE < 130 MM HG: ICD-10-PCS | Mod: CPTII,S$GLB,, | Performed by: STUDENT IN AN ORGANIZED HEALTH CARE EDUCATION/TRAINING PROGRAM

## 2022-12-30 PROCEDURE — 1160F PR REVIEW ALL MEDS BY PRESCRIBER/CLIN PHARMACIST DOCUMENTED: ICD-10-PCS | Mod: CPTII,S$GLB,, | Performed by: STUDENT IN AN ORGANIZED HEALTH CARE EDUCATION/TRAINING PROGRAM

## 2022-12-30 PROCEDURE — 1101F PT FALLS ASSESS-DOCD LE1/YR: CPT | Mod: CPTII,S$GLB,, | Performed by: STUDENT IN AN ORGANIZED HEALTH CARE EDUCATION/TRAINING PROGRAM

## 2022-12-30 PROCEDURE — 3288F PR FALLS RISK ASSESSMENT DOCUMENTED: ICD-10-PCS | Mod: CPTII,S$GLB,, | Performed by: STUDENT IN AN ORGANIZED HEALTH CARE EDUCATION/TRAINING PROGRAM

## 2022-12-30 PROCEDURE — 1160F RVW MEDS BY RX/DR IN RCRD: CPT | Mod: CPTII,S$GLB,, | Performed by: STUDENT IN AN ORGANIZED HEALTH CARE EDUCATION/TRAINING PROGRAM

## 2022-12-30 PROCEDURE — 1126F PR PAIN SEVERITY QUANTIFIED, NO PAIN PRESENT: ICD-10-PCS | Mod: CPTII,S$GLB,, | Performed by: STUDENT IN AN ORGANIZED HEALTH CARE EDUCATION/TRAINING PROGRAM

## 2022-12-30 PROCEDURE — 99999 PR PBB SHADOW E&M-EST. PATIENT-LVL III: CPT | Mod: PBBFAC,,, | Performed by: STUDENT IN AN ORGANIZED HEALTH CARE EDUCATION/TRAINING PROGRAM

## 2022-12-30 PROCEDURE — 3288F FALL RISK ASSESSMENT DOCD: CPT | Mod: CPTII,S$GLB,, | Performed by: STUDENT IN AN ORGANIZED HEALTH CARE EDUCATION/TRAINING PROGRAM

## 2022-12-30 PROCEDURE — 1159F PR MEDICATION LIST DOCUMENTED IN MEDICAL RECORD: ICD-10-PCS | Mod: CPTII,S$GLB,, | Performed by: STUDENT IN AN ORGANIZED HEALTH CARE EDUCATION/TRAINING PROGRAM

## 2022-12-30 PROCEDURE — 3074F SYST BP LT 130 MM HG: CPT | Mod: CPTII,S$GLB,, | Performed by: STUDENT IN AN ORGANIZED HEALTH CARE EDUCATION/TRAINING PROGRAM

## 2022-12-30 PROCEDURE — G0101 PR CA SCREEN;PELVIC/BREAST EXAM: ICD-10-PCS | Mod: S$GLB,,, | Performed by: STUDENT IN AN ORGANIZED HEALTH CARE EDUCATION/TRAINING PROGRAM

## 2022-12-30 PROCEDURE — 3008F BODY MASS INDEX DOCD: CPT | Mod: CPTII,S$GLB,, | Performed by: STUDENT IN AN ORGANIZED HEALTH CARE EDUCATION/TRAINING PROGRAM

## 2022-12-30 PROCEDURE — 1126F AMNT PAIN NOTED NONE PRSNT: CPT | Mod: CPTII,S$GLB,, | Performed by: STUDENT IN AN ORGANIZED HEALTH CARE EDUCATION/TRAINING PROGRAM

## 2022-12-30 PROCEDURE — 3078F DIAST BP <80 MM HG: CPT | Mod: CPTII,S$GLB,, | Performed by: STUDENT IN AN ORGANIZED HEALTH CARE EDUCATION/TRAINING PROGRAM

## 2022-12-30 PROCEDURE — 1101F PR PT FALLS ASSESS DOC 0-1 FALLS W/OUT INJ PAST YR: ICD-10-PCS | Mod: CPTII,S$GLB,, | Performed by: STUDENT IN AN ORGANIZED HEALTH CARE EDUCATION/TRAINING PROGRAM

## 2022-12-30 PROCEDURE — 3078F PR MOST RECENT DIASTOLIC BLOOD PRESSURE < 80 MM HG: ICD-10-PCS | Mod: CPTII,S$GLB,, | Performed by: STUDENT IN AN ORGANIZED HEALTH CARE EDUCATION/TRAINING PROGRAM

## 2022-12-30 PROCEDURE — 99999 PR PBB SHADOW E&M-EST. PATIENT-LVL III: ICD-10-PCS | Mod: PBBFAC,,, | Performed by: STUDENT IN AN ORGANIZED HEALTH CARE EDUCATION/TRAINING PROGRAM

## 2022-12-30 PROCEDURE — 1159F MED LIST DOCD IN RCRD: CPT | Mod: CPTII,S$GLB,, | Performed by: STUDENT IN AN ORGANIZED HEALTH CARE EDUCATION/TRAINING PROGRAM

## 2022-12-30 NOTE — PROGRESS NOTES
History & Physical  Gynecology      SUBJECTIVE:     Chief Complaint: Well Woman       History of Present Illness:  67 y.o.   here for WWE. She is postmenopausal.  She has no unusual complaints.   Denies postmenopausal bleeding.     Cervical cancer screening: no longer indicated   Most recent:  NILM,  NILM   History abnormal: CKC >35 years ago, normal since  Breast cancer screening: UTD, 2022 BIRADS 1, TC 3.1%  Colon cancer screening: UTD 3/2022; 7-YR interval  Bone density: sched per PCP    Family history breast cancer: no  Family history ovarian cancer: no  Family history colon cancer: no        Review of patient's allergies indicates:   Allergen Reactions    No known drug allergies        Past Medical History:   Diagnosis Date    Anisometropia     Cataract     HTN (hypertension) 2012    Hyperopia     OD    Hypertension     Osteoporosis screening      Past Surgical History:   Procedure Laterality Date    CATARACT EXTRACTION W/  INTRAOCULAR LENS IMPLANT Right 9/3/2020    Procedure: EXTRACTION, CATARACT, WITH IOL INSERTION;  Surgeon: Ambika Begum MD;  Location: [facility];  Service: Ophthalmology;  Laterality: Right;    CATARACT EXTRACTION W/  INTRAOCULAR LENS IMPLANT Left 2020    Procedure: EXTRACTION, CATARACT, WITH IOL INSERTION;  Surgeon: Ambika Begum MD;  Location: [facility] OR;  Service: Ophthalmology;  Laterality: Left;    CHOLECYSTECTOMY      COLONOSCOPY N/A 3/8/2022    Procedure: COLONOSCOPY;  Surgeon: MENA Patel MD;  Location: [address]);  Service: Endoscopy;  Laterality: N/A;  prep ins. emailed / COVID screening [place] 3/5/22- ERW    EYE SURGERY       OB History          2    Para   2    Term   2            AB        Living   2         SAB        IAB        Ectopic        Multiple        Live Births   2               Family History   Problem Relation Age of Onset    Cataracts Mother     Macular degeneration Mother     Hypertension Mother     Cataracts  Father     Hypertension Father     Heart attack Father     No Known Problems Sister     No Known Problems Son     Heart disease Paternal Uncle     No Known Problems Son      Social History     Tobacco Use    Smoking status: Former     Packs/day: 0.25     Years: 10.00     Pack years: 2.50     Types: Cigarettes     Quit date: 1985     Years since quittin.0    Smokeless tobacco: Never   Substance Use Topics    Alcohol use: Yes     Alcohol/week: 4.0 standard drinks     Types: 4 Glasses of wine per week     Comment: socially, glass of wine daily    Drug use: Never       Current Outpatient Medications   Medication Sig    famotidine (PEPCID) 10 MG tablet Take 10 mg by mouth once daily.    fluticasone propionate (FLONASE) 50 mcg/actuation nasal spray 2 sprays (100 mcg total) by Each Nostril route once daily.    minoxidiL (LONITEN) 2.5 MG tablet Take by mouth.    semaglutide (OZEMPIC) 0.25 mg or 0.5 mg(2 mg/1.5 mL) pen injector Inject 0.5 mg into the skin every 7 days.    spironolactone-hydrochlorothiazide 25-25mg (ALDACTAZIDE) 25-25 mg Tab Take 1 tablet by mouth once daily.     No current facility-administered medications for this visit.         Review of Systems:  Review of Systems   Constitutional:  Negative for chills, fever and unexpected weight change.   HENT:  Negative for tinnitus.    Respiratory:  Negative for cough and shortness of breath.    Cardiovascular:  Negative for chest pain, palpitations and leg swelling.   Gastrointestinal:  Negative for abdominal pain, constipation, diarrhea, nausea and vomiting.   Endocrine: Negative for diabetes, hyperthyroidism and hypothyroidism.   Genitourinary:  Negative for dysuria, pelvic pain, vaginal discharge and postmenopausal bleeding.   Musculoskeletal:  Negative for myalgias.   Integumentary:  Negative for rash, hair changes, breast mass, nipple discharge, breast skin changes and breast tenderness.   Neurological:  Negative for seizures, syncope and headaches.    Hematological:  Does not bruise/bleed easily.   Psychiatric/Behavioral:  Negative for depression. The patient is not nervous/anxious.    Breast: Negative for lump, mass, mastodynia, nipple discharge, skin changes and tenderness     OBJECTIVE:     Physical Exam:  Physical Exam  Exam conducted with a chaperone present.   Constitutional:       General: She is not in acute distress.     Appearance: Normal appearance. She is well-developed.   HENT:      Head: Normocephalic and atraumatic.      Nose: No epistaxis.   Eyes:      Conjunctiva/sclera: Conjunctivae normal.   Pulmonary:      Effort: Pulmonary effort is normal. No respiratory distress.   Chest:   Breasts:     Right: No inverted nipple, mass, nipple discharge, skin change or tenderness.      Left: No inverted nipple, mass, nipple discharge, skin change or tenderness.   Abdominal:      General: There is no distension.      Palpations: Abdomen is soft. There is no mass.      Tenderness: There is no abdominal tenderness. There is no guarding or rebound.      Hernia: No hernia is present.   Genitourinary:     General: Normal vulva.      Pubic Area: No rash.       Labia:         Right: No rash, tenderness or lesion.         Left: No rash, tenderness or lesion.       Urethra: No prolapse, urethral pain, urethral swelling or urethral lesion.      Vagina: Normal. No vaginal discharge, tenderness or bleeding.      Cervix: No cervical motion tenderness, discharge, friability or lesion.      Uterus: Normal. Not enlarged and not tender.       Adnexa: Right adnexa normal and left adnexa normal.        Right: No mass, tenderness or fullness.          Left: No mass, tenderness or fullness.     Musculoskeletal:         General: No tenderness. Normal range of motion.      Right lower leg: No edema.      Left lower leg: No edema.   Skin:     General: Skin is warm and dry.      Findings: No erythema.   Neurological:      Mental Status: She is alert and oriented to person, place,  and time.   Psychiatric:         Mood and Affect: Mood normal.         Behavior: Behavior normal.         ASSESSMENT:       ICD-10-CM ICD-9-CM    1. Encounter for well woman exam  Z01.419 V72.31 Ambulatory referral/consult to Gynecology             Plan:      Jyoti was seen today for well woman.    Diagnoses and all orders for this visit:    Encounter for well woman exam  -     Ambulatory referral/consult to Gynecology      >25 years since dysplasia history & documented normal paps 2010 ([facility]), 2014. OK to d/c paps with age >65. D/w pt who is agreeable.    Pelvic and breast exams are within normal limits.  Discussed pelvic exams every other year or sooner should problems arise.      Laila Kuo

## 2023-01-05 ENCOUNTER — HOSPITAL ENCOUNTER (OUTPATIENT)
Dept: RADIOLOGY | Facility: OTHER | Age: 68
Discharge: HOME | End: 2023-01-05
Attending: INTERNAL MEDICINE
Payer: MEDICARE

## 2023-01-05 DIAGNOSIS — M85.851 OSTEOPENIA OF NECKS OF BOTH FEMURS: ICD-10-CM

## 2023-01-05 DIAGNOSIS — M85.852 OSTEOPENIA OF NECKS OF BOTH FEMURS: ICD-10-CM

## 2023-01-05 PROCEDURE — 77080 DEXA BONE DENSITY SPINE HIP: ICD-10-PCS | Mod: 26,,, | Performed by: RADIOLOGY

## 2023-01-05 PROCEDURE — 77080 DXA BONE DENSITY AXIAL: CPT | Mod: 26,,, | Performed by: RADIOLOGY

## 2023-01-05 PROCEDURE — 77080 DXA BONE DENSITY AXIAL: CPT | Mod: TC

## 2023-01-11 ENCOUNTER — TELEPHONE (OUTPATIENT)
Dept: INTERNAL MEDICINE | Facility: CLINIC | Age: 68
End: 2023-01-11
Payer: MEDICARE

## 2023-01-11 DIAGNOSIS — M81.6 LOCALIZED OSTEOPOROSIS, UNSPECIFIED PATHOLOGICAL FRACTURE PRESENCE: Primary | ICD-10-CM

## 2023-01-11 NOTE — TELEPHONE ENCOUNTER
Informed patient of PCP message. Patient agreed with PCP recommendation.     ----- Message from Nish Steiner MD sent at 1/10/2023  8:15 AM CST -----  Bone scan reviewed. Osteoporosis of hip noted - recommend evaluation by Endocrinology. If pt agreeable, will place order.

## 2023-01-11 NOTE — TELEPHONE ENCOUNTER
----- Message from Nish Steiner MD sent at 1/10/2023  8:15 AM CST -----  Bone scan reviewed. Osteoporosis of hip noted - recommend evaluation by Endocrinology. If pt agreeable, will place order.

## 2023-02-22 ENCOUNTER — PATIENT OUTREACH (OUTPATIENT)
Dept: ADMINISTRATIVE | Facility: HOSPITAL | Age: 68
End: 2023-02-22
Payer: MEDICARE

## 2023-02-22 NOTE — PROGRESS NOTES
Health Maintenance Due   Topic Date Due    TETANUS VACCINE  Never done    Hemoglobin A1c (Diabetic Prevention Screening)  Never done     Chart review done. HM updated. Immunizations reviewed & updated. Care Everywhere updated.  Chart reviewed for PHN attestation.

## 2023-03-15 ENCOUNTER — OFFICE VISIT (OUTPATIENT)
Dept: ENDOCRINOLOGY | Facility: CLINIC | Age: 68
End: 2023-03-15
Payer: MEDICARE

## 2023-03-15 VITALS
TEMPERATURE: 98 F | HEIGHT: 66 IN | BODY MASS INDEX: 24.08 KG/M2 | HEART RATE: 74 BPM | DIASTOLIC BLOOD PRESSURE: 68 MMHG | SYSTOLIC BLOOD PRESSURE: 129 MMHG | WEIGHT: 149.81 LBS

## 2023-03-15 DIAGNOSIS — I10 ESSENTIAL HYPERTENSION: ICD-10-CM

## 2023-03-15 DIAGNOSIS — E55.9 VITAMIN D DEFICIENCY: ICD-10-CM

## 2023-03-15 DIAGNOSIS — M81.6 LOCALIZED OSTEOPOROSIS, UNSPECIFIED PATHOLOGICAL FRACTURE PRESENCE: ICD-10-CM

## 2023-03-15 DIAGNOSIS — M81.0 AGE-RELATED OSTEOPOROSIS WITHOUT CURRENT PATHOLOGICAL FRACTURE: Primary | ICD-10-CM

## 2023-03-15 PROCEDURE — 3008F PR BODY MASS INDEX (BMI) DOCUMENTED: ICD-10-PCS | Mod: CPTII,S$GLB,, | Performed by: HOSPITALIST

## 2023-03-15 PROCEDURE — 3288F PR FALLS RISK ASSESSMENT DOCUMENTED: ICD-10-PCS | Mod: CPTII,S$GLB,, | Performed by: HOSPITALIST

## 2023-03-15 PROCEDURE — 1159F MED LIST DOCD IN RCRD: CPT | Mod: CPTII,S$GLB,, | Performed by: HOSPITALIST

## 2023-03-15 PROCEDURE — 1101F PR PT FALLS ASSESS DOC 0-1 FALLS W/OUT INJ PAST YR: ICD-10-PCS | Mod: CPTII,S$GLB,, | Performed by: HOSPITALIST

## 2023-03-15 PROCEDURE — 3078F PR MOST RECENT DIASTOLIC BLOOD PRESSURE < 80 MM HG: ICD-10-PCS | Mod: CPTII,S$GLB,, | Performed by: HOSPITALIST

## 2023-03-15 PROCEDURE — 99999 PR PBB SHADOW E&M-EST. PATIENT-LVL IV: ICD-10-PCS | Mod: PBBFAC,,, | Performed by: HOSPITALIST

## 2023-03-15 PROCEDURE — 1101F PT FALLS ASSESS-DOCD LE1/YR: CPT | Mod: CPTII,S$GLB,, | Performed by: HOSPITALIST

## 2023-03-15 PROCEDURE — 3288F FALL RISK ASSESSMENT DOCD: CPT | Mod: CPTII,S$GLB,, | Performed by: HOSPITALIST

## 2023-03-15 PROCEDURE — 3074F SYST BP LT 130 MM HG: CPT | Mod: CPTII,S$GLB,, | Performed by: HOSPITALIST

## 2023-03-15 PROCEDURE — 1159F PR MEDICATION LIST DOCUMENTED IN MEDICAL RECORD: ICD-10-PCS | Mod: CPTII,S$GLB,, | Performed by: HOSPITALIST

## 2023-03-15 PROCEDURE — 1126F PR PAIN SEVERITY QUANTIFIED, NO PAIN PRESENT: ICD-10-PCS | Mod: CPTII,S$GLB,, | Performed by: HOSPITALIST

## 2023-03-15 PROCEDURE — 99204 PR OFFICE/OUTPT VISIT, NEW, LEVL IV, 45-59 MIN: ICD-10-PCS | Mod: S$GLB,,, | Performed by: HOSPITALIST

## 2023-03-15 PROCEDURE — 99204 OFFICE O/P NEW MOD 45 MIN: CPT | Mod: S$GLB,,, | Performed by: HOSPITALIST

## 2023-03-15 PROCEDURE — 99999 PR PBB SHADOW E&M-EST. PATIENT-LVL IV: CPT | Mod: PBBFAC,,, | Performed by: HOSPITALIST

## 2023-03-15 PROCEDURE — 3078F DIAST BP <80 MM HG: CPT | Mod: CPTII,S$GLB,, | Performed by: HOSPITALIST

## 2023-03-15 PROCEDURE — 1160F PR REVIEW ALL MEDS BY PRESCRIBER/CLIN PHARMACIST DOCUMENTED: ICD-10-PCS | Mod: CPTII,S$GLB,, | Performed by: HOSPITALIST

## 2023-03-15 PROCEDURE — 3074F PR MOST RECENT SYSTOLIC BLOOD PRESSURE < 130 MM HG: ICD-10-PCS | Mod: CPTII,S$GLB,, | Performed by: HOSPITALIST

## 2023-03-15 PROCEDURE — 1126F AMNT PAIN NOTED NONE PRSNT: CPT | Mod: CPTII,S$GLB,, | Performed by: HOSPITALIST

## 2023-03-15 PROCEDURE — 3008F BODY MASS INDEX DOCD: CPT | Mod: CPTII,S$GLB,, | Performed by: HOSPITALIST

## 2023-03-15 PROCEDURE — 1160F RVW MEDS BY RX/DR IN RCRD: CPT | Mod: CPTII,S$GLB,, | Performed by: HOSPITALIST

## 2023-03-15 RX ORDER — ALENDRONATE SODIUM 70 MG/1
70 TABLET ORAL
Qty: 12 TABLET | Refills: 4 | Status: SHIPPED | OUTPATIENT
Start: 2023-03-15 | End: 2023-11-20

## 2023-03-15 NOTE — PROGRESS NOTES
"Subjective:      Patient ID: Jyoti Tyler is a 67 y.o. female presented to [facility] on 3/15/2023.  Chief Complaint:  Osteoporosis      History of Present Illness: Jyoti Tyler is a 67 y.o. female here for  osteoporosis  Other significant past medical history:  Weight gain, hypertension    1) Osteoporosis  - Diagnosis on DXA in osteoporosis recently, 01/2023.  Patient does report history of osteopenia for many years prior  - has never been on medication for her bones, denies Fosamax use, oral bisphosphonate  - Vit D intake?/Calcium intake?  Liquid calcium/vitamin-D supplement  - Patient lives with:  Family at home   - Walking gait:  Normal    Osteoporosis Risk Factor Assessment:  - History of falls over the last 2 years: no  - History of fractures to wrist, hip or spine:no  - History of loss of height of more than 1 1/2 to 2 inches: no, measured height in clinic: 5'5.5"  - Family history of osteoporosis: yes, mother with osteoporosis  - Family history of fractures of hip: no  - Age of menopause: 40s, No: Hysterectomy, Yes: use of HRT, stop 6 year ago  - Tobacco use, including use in the past:  yes, fomer smoker, stop 28 yo  - Weight bearing exercise?   yes (walking, strength training 2x a week)  - Dental work planned? no, routine cleaning  - History of kidney stones, no  - History of cancer or malignancy, history of malignancy the involved the bone, prior radiation treatment , no    Recent DXA: 1/05/2023  BMD lumbar spine 0.982 g/cm squared.  T-score -1.3, Z-score 0.3.  BMD left femoral neck is 0.690 g/cm squared.  T-score -2.5, Z-score -0.9.  BMD right femoral neck is 0.776 g/cm squared.  T-score -1.9, Z-score -0.3.     Impression:  Osteopenia lumbar spine and right hip.  Osteoporosis left hip.  Ten year probability of fracture:  Major osteoporotic fracture 14.7%, hip 3.6%.    Lab work reviewed  Lab Results   Component Value Date    [id number] 24 (L) 11/29/2022    " "[id number] 23 (L) 09/09/2021    [id number] 24 (L) 06/20/2019    CALCIUM 9.7 11/29/2022    CALCIUM 9.7 09/09/2021    CALCIUM 9.3 08/12/2020    PHOS 3.3 08/13/2011    PHOS 3.1 08/12/2011    PHOS 4.1 08/11/2011    ALKPHOS 86 11/29/2022    ALKPHOS 89 09/09/2021    ALKPHOS 98 07/08/2020    TSH 3.799 06/20/2019     2) Obesity  - 30 lb weight loss  - Ozempic for weight loss, not yet started  - Previouslty on Wegovy for weight loss    Reviewed past surgical, medical, family, social history and updated as appropriate.  Review of Systems: see HPI above    Objective:   /68 (BP Location: Right arm)   Pulse 74   Temp 98.1 °F (36.7 °C) (Oral)   Ht 5' 5.5" (1.664 m)   Wt 67.9 kg (149 lb 12.8 oz)   LMP 01/01/2000 (Approximate)   BMI 24.55 kg/m²   Body mass index is 24.55 kg/m².  Vital signs reviewed    Physical Exam  Vitals and nursing note reviewed.   Constitutional:       Appearance: Normal appearance. She is well-developed. She is not ill-appearing.   Neck:      Thyroid: No thyromegaly.   Pulmonary:      Effort: Pulmonary effort is normal. No respiratory distress.   Musculoskeletal:         General: Normal range of motion.      Cervical back: Normal range of motion.   Neurological:      General: No focal deficit present.      Mental Status: She is alert. Mental status is at baseline.   Psychiatric:         Mood and Affect: Mood normal.         Behavior: Behavior normal.     Lab Reviewed:  See results in subjective  No results found for: HGBA1C  Lab Results   Component Value Date    CHOL 207 (H) 11/29/2022    HDL 56 11/29/2022    LDLCALC 132.8 11/29/2022    TRIG 91 11/29/2022    CHOLHDL 27.1 11/29/2022     Lab Results   Component Value Date     11/29/2022    K 3.7 11/29/2022    CL 98 11/29/2022    CO2 30 (H) 11/29/2022     11/29/2022    BUN 18 11/29/2022    CREATININE 0.8 11/29/2022    CALCIUM 9.7 11/29/2022    PHOS 3.3 08/13/2011    PROT 6.6 11/29/2022    ALBUMIN 4.1 11/29/2022    BILITOT 1.0 " 11/29/2022    ALKPHOS 86 11/29/2022    AST 19 11/29/2022    ALT 17 11/29/2022    ANIONGAP 10 11/29/2022    ESTGFRAFRICA >60.0 09/09/2021    EGFRNONAA >60.0 09/09/2021    TSH 3.799 06/20/2019    [id number] 24 (L) 11/29/2022     Assessment     1. Age-related osteoporosis without current pathological fracture        2. Localized osteoporosis, unspecified pathological fracture presence  Ambulatory referral/consult to Endocrinology    alendronate (FOSAMAX) 70 MG tablet    Vitamin D    TSH    Comprehensive Metabolic Panel    Magnesium    Phosphorus      3. Vitamin D deficiency        4. Essential hypertension           Plan     Age-related osteoporosis without current pathological fracture  - Patient with Osteoporosis diagnosed on DEXA scan newly diagnosed 01/2023  - Most recent DXA scan personal reviewed by me and discussed with patient in clinic.  Osteoporosis femoral neck  - Risk factors include:  Postmenopausal, family history of osteoporosis, small stature  - For treatment options discussed with patient in clinic today: Bisphosphonate: Oral/IV, Prolia>> we will start patient on trial of Fosamax 70 mg once a week  - Side effect profile and duration of therapy discussed  - We will perform bloodwork workup: check CMP,  vitamin-D, TSH in 4 months  - Next DXA:  2 years from  most current, 2025  - Continue Calcium/Vit D supplements  - Fall precautions/Exercise regimen: advised  - Routine dental health screening advised    Vitamin D deficiency  - vitamin-D deficiency noted, advised patient to start OTC vitamin D3 2000 IU daily  - monitor with repeat lab work in 4 months    Essential hypertension  - on medical therapies  -continue management by PCP    Advised patient to follow up with PCP for routine health maintenance care.   RTC in 4 months      Raúl Oro M.D.  Endocrinology  [facility]  3/15/2023      Disclaimer: This note has been generated in part with the use of voice-recognition  software. There may be typographical errors that have been missed during proof-reading.

## 2023-03-15 NOTE — ASSESSMENT & PLAN NOTE
- vitamin-D deficiency noted, advised patient to start OTC vitamin D3 2000 IU daily  - monitor with repeat lab work in 4 months

## 2023-03-15 NOTE — ASSESSMENT & PLAN NOTE
- Patient with Osteoporosis diagnosed on DEXA scan newly diagnosed 01/2023  - Most recent DXA scan personal reviewed by me and discussed with patient in clinic.  Osteoporosis femoral neck  - Risk factors include:  Postmenopausal, family history of osteoporosis, small stature  - For treatment options discussed with patient in clinic today: Bisphosphonate: Oral/IV, Prolia>> we will start patient on trial of Fosamax 70 mg once a week  - Side effect profile and duration of therapy discussed  - We will perform bloodwork workup: check CMP,  vitamin-D, TSH in 4 months  - Next DXA:  2 years from  most current, 2025  - Continue Calcium/Vit D supplements  - Fall precautions/Exercise regimen: advised  - Routine dental health screening advised

## 2023-05-04 ENCOUNTER — PATIENT MESSAGE (OUTPATIENT)
Dept: ENDOCRINOLOGY | Facility: CLINIC | Age: 68
End: 2023-05-04
Payer: MEDICARE

## 2023-05-10 ENCOUNTER — PES CALL (OUTPATIENT)
Dept: ADMINISTRATIVE | Facility: CLINIC | Age: 68
End: 2023-05-10
Payer: MEDICARE

## 2023-06-21 ENCOUNTER — PES CALL (OUTPATIENT)
Dept: ADMINISTRATIVE | Facility: CLINIC | Age: 68
End: 2023-06-21
Payer: MEDICARE

## 2023-11-20 ENCOUNTER — OFFICE VISIT (OUTPATIENT)
Dept: INTERNAL MEDICINE | Facility: CLINIC | Age: 68
End: 2023-11-20
Payer: MEDICARE

## 2023-11-20 VITALS
DIASTOLIC BLOOD PRESSURE: 68 MMHG | HEART RATE: 78 BPM | RESPIRATION RATE: 16 BRPM | SYSTOLIC BLOOD PRESSURE: 120 MMHG | HEIGHT: 65 IN | OXYGEN SATURATION: 99 % | BODY MASS INDEX: 24.43 KG/M2 | WEIGHT: 146.63 LBS

## 2023-11-20 DIAGNOSIS — E78.2 MIXED HYPERLIPIDEMIA: ICD-10-CM

## 2023-11-20 DIAGNOSIS — I77.9 MILD CAROTID ARTERY DISEASE: ICD-10-CM

## 2023-11-20 DIAGNOSIS — R20.2 PARESTHESIAS: ICD-10-CM

## 2023-11-20 DIAGNOSIS — Z76.89 ENCOUNTER TO ESTABLISH CARE WITH NEW DOCTOR: Primary | ICD-10-CM

## 2023-11-20 DIAGNOSIS — Z12.31 SCREENING MAMMOGRAM FOR BREAST CANCER: ICD-10-CM

## 2023-11-20 DIAGNOSIS — L65.8 FEMALE PATTERN HAIR LOSS: ICD-10-CM

## 2023-11-20 DIAGNOSIS — M81.0 AGE-RELATED OSTEOPOROSIS WITHOUT CURRENT PATHOLOGICAL FRACTURE: ICD-10-CM

## 2023-11-20 DIAGNOSIS — I10 ESSENTIAL HYPERTENSION: ICD-10-CM

## 2023-11-20 PROBLEM — M85.80 OSTEOPENIA: Status: RESOLVED | Noted: 2017-07-12 | Resolved: 2023-11-20

## 2023-11-20 PROCEDURE — 1126F AMNT PAIN NOTED NONE PRSNT: CPT | Mod: CPTII,S$GLB,, | Performed by: STUDENT IN AN ORGANIZED HEALTH CARE EDUCATION/TRAINING PROGRAM

## 2023-11-20 PROCEDURE — 3078F DIAST BP <80 MM HG: CPT | Mod: CPTII,S$GLB,, | Performed by: STUDENT IN AN ORGANIZED HEALTH CARE EDUCATION/TRAINING PROGRAM

## 2023-11-20 PROCEDURE — 1101F PT FALLS ASSESS-DOCD LE1/YR: CPT | Mod: CPTII,S$GLB,, | Performed by: STUDENT IN AN ORGANIZED HEALTH CARE EDUCATION/TRAINING PROGRAM

## 2023-11-20 PROCEDURE — 99214 OFFICE O/P EST MOD 30 MIN: CPT | Mod: S$GLB,,, | Performed by: STUDENT IN AN ORGANIZED HEALTH CARE EDUCATION/TRAINING PROGRAM

## 2023-11-20 PROCEDURE — 3008F PR BODY MASS INDEX (BMI) DOCUMENTED: ICD-10-PCS | Mod: CPTII,S$GLB,, | Performed by: STUDENT IN AN ORGANIZED HEALTH CARE EDUCATION/TRAINING PROGRAM

## 2023-11-20 PROCEDURE — 99999 PR PBB SHADOW E&M-EST. PATIENT-LVL III: ICD-10-PCS | Mod: PBBFAC,,, | Performed by: STUDENT IN AN ORGANIZED HEALTH CARE EDUCATION/TRAINING PROGRAM

## 2023-11-20 PROCEDURE — 1160F PR REVIEW ALL MEDS BY PRESCRIBER/CLIN PHARMACIST DOCUMENTED: ICD-10-PCS | Mod: CPTII,S$GLB,, | Performed by: STUDENT IN AN ORGANIZED HEALTH CARE EDUCATION/TRAINING PROGRAM

## 2023-11-20 PROCEDURE — 99999 PR PBB SHADOW E&M-EST. PATIENT-LVL III: CPT | Mod: PBBFAC,,, | Performed by: STUDENT IN AN ORGANIZED HEALTH CARE EDUCATION/TRAINING PROGRAM

## 2023-11-20 PROCEDURE — 3288F FALL RISK ASSESSMENT DOCD: CPT | Mod: CPTII,S$GLB,, | Performed by: STUDENT IN AN ORGANIZED HEALTH CARE EDUCATION/TRAINING PROGRAM

## 2023-11-20 PROCEDURE — 3288F PR FALLS RISK ASSESSMENT DOCUMENTED: ICD-10-PCS | Mod: CPTII,S$GLB,, | Performed by: STUDENT IN AN ORGANIZED HEALTH CARE EDUCATION/TRAINING PROGRAM

## 2023-11-20 PROCEDURE — 1159F PR MEDICATION LIST DOCUMENTED IN MEDICAL RECORD: ICD-10-PCS | Mod: CPTII,S$GLB,, | Performed by: STUDENT IN AN ORGANIZED HEALTH CARE EDUCATION/TRAINING PROGRAM

## 2023-11-20 PROCEDURE — 3008F BODY MASS INDEX DOCD: CPT | Mod: CPTII,S$GLB,, | Performed by: STUDENT IN AN ORGANIZED HEALTH CARE EDUCATION/TRAINING PROGRAM

## 2023-11-20 PROCEDURE — 3074F SYST BP LT 130 MM HG: CPT | Mod: CPTII,S$GLB,, | Performed by: STUDENT IN AN ORGANIZED HEALTH CARE EDUCATION/TRAINING PROGRAM

## 2023-11-20 PROCEDURE — 1101F PR PT FALLS ASSESS DOC 0-1 FALLS W/OUT INJ PAST YR: ICD-10-PCS | Mod: CPTII,S$GLB,, | Performed by: STUDENT IN AN ORGANIZED HEALTH CARE EDUCATION/TRAINING PROGRAM

## 2023-11-20 PROCEDURE — 1159F MED LIST DOCD IN RCRD: CPT | Mod: CPTII,S$GLB,, | Performed by: STUDENT IN AN ORGANIZED HEALTH CARE EDUCATION/TRAINING PROGRAM

## 2023-11-20 PROCEDURE — 99214 PR OFFICE/OUTPT VISIT, EST, LEVL IV, 30-39 MIN: ICD-10-PCS | Mod: S$GLB,,, | Performed by: STUDENT IN AN ORGANIZED HEALTH CARE EDUCATION/TRAINING PROGRAM

## 2023-11-20 PROCEDURE — 3078F PR MOST RECENT DIASTOLIC BLOOD PRESSURE < 80 MM HG: ICD-10-PCS | Mod: CPTII,S$GLB,, | Performed by: STUDENT IN AN ORGANIZED HEALTH CARE EDUCATION/TRAINING PROGRAM

## 2023-11-20 PROCEDURE — 1126F PR PAIN SEVERITY QUANTIFIED, NO PAIN PRESENT: ICD-10-PCS | Mod: CPTII,S$GLB,, | Performed by: STUDENT IN AN ORGANIZED HEALTH CARE EDUCATION/TRAINING PROGRAM

## 2023-11-20 PROCEDURE — 1160F RVW MEDS BY RX/DR IN RCRD: CPT | Mod: CPTII,S$GLB,, | Performed by: STUDENT IN AN ORGANIZED HEALTH CARE EDUCATION/TRAINING PROGRAM

## 2023-11-20 PROCEDURE — 3074F PR MOST RECENT SYSTOLIC BLOOD PRESSURE < 130 MM HG: ICD-10-PCS | Mod: CPTII,S$GLB,, | Performed by: STUDENT IN AN ORGANIZED HEALTH CARE EDUCATION/TRAINING PROGRAM

## 2023-11-20 RX ORDER — SPIRONOLACTONE AND HYDROCHLOROTHIAZIDE 25; 25 MG/1; MG/1
1 TABLET ORAL DAILY
Qty: 90 TABLET | Refills: 3 | Status: SHIPPED | OUTPATIENT
Start: 2023-11-20

## 2023-11-20 RX ORDER — FLUTICASONE PROPIONATE 50 MCG
2 SPRAY, SUSPENSION (ML) NASAL DAILY
Qty: 16 G | Refills: 3 | Status: SHIPPED | OUTPATIENT
Start: 2023-11-20 | End: 2024-02-02

## 2023-11-20 NOTE — ASSESSMENT & PLAN NOTE
Establishing care with me. Reviewed medical hx, labs and care gaps today. Ordered labs, mammogram and medications.   Patient is exercising and maintaining a healthy lifestyle.

## 2023-11-20 NOTE — ASSESSMENT & PLAN NOTE
Last carotid U/S was 2016 which showed BL stenosis of 20-39%. Pt has been exercising and lost weight since then.

## 2023-11-20 NOTE — ASSESSMENT & PLAN NOTE
BP well controlled on current regimen. She is taking spironolactone-hctz daily. Continue. Ordered labs.

## 2023-11-20 NOTE — ASSESSMENT & PLAN NOTE
Pt saw dr. Oro but pt is not comfortable taking alendronate. She is not taking it. Asked her to take vitamin d3 2000 U daily and make sure to eat calcium in her diet daily. She does weight bearing exercises. She is also a  so she walks a lot.

## 2023-11-20 NOTE — PROGRESS NOTES
INTERNAL MEDICINE INITIAL VISIT NOTE      CHIEF COMPLAINT     Chief Complaint   Patient presents with    Annual Exam    Establish Care       HPI     Jyoti Tyler is a 68 y.o.  female who presents with allergies, carotid artery disease (20-39%), HTN, osteoporosis, here today to establish care with me and an annual visit  -got sick on nov 7th. She thought she was getting better but started feel bad again last night. Coughing and chills. She does wake up sweaty at night.   -wondering if she should go back on hormonal therapy.  Asked her to discuss with gyn.   -sometimes has a tremor in her right hand. No fam hx of parkisons or essential tremor. Started happening 3 yrs ago. Feels like slightly worse. Does not bother her daily. She will wait and see if it gets worse.  -has trouble sleeping. Melatonin helps. Asked her to try magnesium  -take vitamin d3 2000 U daily  -she will get her vaccines once she is feeling better.   Past Medical History:  Past Medical History:   Diagnosis Date    Anisometropia     Cataract     HTN (hypertension) 11/7/2012    Hyperopia     OD    Hypertension     Osteoporosis screening        Past Surgical History:  Past Surgical History:   Procedure Laterality Date    CATARACT EXTRACTION W/  INTRAOCULAR LENS IMPLANT Right 9/3/2020    Procedure: EXTRACTION, CATARACT, WITH IOL INSERTION;  Surgeon: Ambika Begum MD;  Location: [facility];  Service: Ophthalmology;  Laterality: Right;    CATARACT EXTRACTION W/  INTRAOCULAR LENS IMPLANT Left 9/21/2020    Procedure: EXTRACTION, CATARACT, WITH IOL INSERTION;  Surgeon: Ambika Begum MD;  Location: [facility];  Service: Ophthalmology;  Laterality: Left;    CHOLECYSTECTOMY      COLONOSCOPY N/A 3/8/2022    Procedure: COLONOSCOPY;  Surgeon: MENA Patel MD;  Location: [address]);  Service: Endoscopy;  Laterality: N/A;  prep ins. emailed / COVID screening [place] 3/5/22- ERW    EYE SURGERY         Allergies:  Review of patient's allergies  indicates:   Allergen Reactions    No known drug allergies        Home Medications:  Prior to Admission medications    Medication Sig Start Date End Date Taking? Authorizing Provider   alendronate (FOSAMAX) 70 MG tablet Take 1 tablet (70 mg total) by mouth every 7 days. Take  first thing in the morning, before taking your first food, drink, or other medicine, swallow your FOSAMAX tablet whole with a full glass (8 oz) of plain water only. 3/15/23 3/14/24 Yes Raúl Oro MD   famotidine (PEPCID) 10 MG tablet Take 10 mg by mouth once daily.   Yes Provider, Andres   fluticasone propionate (FLONASE) 50 mcg/actuation nasal spray 2 sprays (100 mcg total) by Each Nostril route once daily. 20  Yes Judy Eaton MD   minoxidiL (LONITEN) 2.5 MG tablet Take 1 tablet (2.5 mg total) by mouth once daily. 10/31/23 1/29/24 Yes Jamee Velázquez MD   spironolactone-hydrochlorothiazide 25-25mg (ALDACTAZIDE) 25-25 mg Tab Take 1 tablet by mouth once daily. 22  Yes Nish Steiner MD       Family History:  Family History   Problem Relation Age of Onset    Cataracts Mother     Macular degeneration Mother     Hypertension Mother     Cataracts Father     Hypertension Father     Heart attack Father     No Known Problems Sister     No Known Problems Son     Heart disease Paternal Uncle     No Known Problems Son        Social History:  Social History     Tobacco Use    Smoking status: Former     Current packs/day: 0.00     Average packs/day: 0.3 packs/day for 10.0 years (2.5 ttl pk-yrs)     Types: Cigarettes     Start date: 1975     Quit date: 1985     Years since quittin.9    Smokeless tobacco: Never   Substance Use Topics    Alcohol use: Yes     Alcohol/week: 4.0 standard drinks of alcohol     Types: 4 Glasses of wine per week     Comment: socially, glass of wine daily    Drug use: Never       Review of Systems:  Review of Systems   Constitutional:  Positive for chills and fatigue. Negative for fever.  "  HENT:  Positive for postnasal drip and rhinorrhea. Negative for sinus pressure, sinus pain and sore throat.    Respiratory:  Positive for cough. Negative for chest tightness and shortness of breath.    Cardiovascular:  Negative for chest pain.   Gastrointestinal:  Negative for abdominal pain, blood in stool, diarrhea, nausea and vomiting.   Genitourinary:  Negative for dysuria and hematuria.   Neurological:  Negative for dizziness, light-headedness and headaches.   Psychiatric/Behavioral:  Negative for confusion.        Health Maintainence:   Td -due for that  Flu -due for that but she is sick so will get it in a week   Prevnar 6/2019  Pneumovax 7/8/2020  Zoster 7/2020, 12/2020  [facility] ordered it  C-SCOPE 3/2022, repeat in 7 yrs  DEXA 1/5/23, left hip osteoporosis. Saw endo, does not want to take alendronate due to potential side effects  Hep C screening 8/2011  Low Dose CT scan in pts if 55-79 y/o with 30 pack yr smoking hx and currently smoke or have quit within past 15 yrs. Does not meet criteria     PHYSICAL EXAM     /68 (BP Location: Left arm, Patient Position: Sitting, BP Method: Small (Manual))   Pulse 78   Resp 16   Ht 5' 5" (1.651 m)   Wt 66.5 kg (146 lb 9.7 oz)   LMP 01/01/2000 (Approximate)   SpO2 99%   BMI 24.40 kg/m²     GEN - A+OX4, NAD   HEENT - PERRL  Neck - No thyromegaly or cervical LAD. No thyroid masses felt.  CV - RRR, no m/r, no carotid bruit  Chest - CTAB, no wheezing or rhonchi  Abd - S/NT/ND/+BS.   Ext - 2+BDP and radial pulses. No LE edema.   MSK - Normal gait.   Skin - No rash.    LABS     Previous labs reviewed from 11/29/2022. Lfts, renal function, hb, wbc wnl.     ASSESSMENT/PLAN   1. Encounter to establish care with new doctor  Assessment & Plan:  Establishing care with me. Reviewed medical hx, labs and care gaps today. Ordered labs, mammogram and medications.   Patient is exercising and maintaining a healthy lifestyle.       2. Essential " hypertension  Overview:  2012    Assessment & Plan:  BP well controlled on current regimen. She is taking spironolactone-hctz daily. Continue. Ordered labs.     Orders:  -     CBC Auto Differential; Future; Expected date: 11/20/2023  -     spironolactone-hydrochlorothiazide 25-25mg (ALDACTAZIDE) 25-25 mg Tab; Take 1 tablet by mouth once daily.  Dispense: 90 tablet; Refill: 3    3. Age-related osteoporosis without current pathological fracture  Assessment & Plan:  Pt saw dr. Oro but pt is not comfortable taking alendronate. She is not taking it. Asked her to take vitamin d3 2000 U daily and make sure to eat calcium in her diet daily. She does weight bearing exercises. She is also a  so she walks a lot.      4. Female pattern hair loss  Assessment & Plan:  Patient takes minoxidil daily. Sees Dr. Jamee Velázquez for his. Continue.       5. Mild carotid artery disease  Overview:  20-39% stenosis bilaterally, seen on ultrasound from 04/05/16    Assessment & Plan:  Last carotid U/S was 2016 which showed BL stenosis of 20-39%. Pt has been exercising and lost weight since then.       6. Mixed hyperlipidemia  -     Lipid Panel; Future; Expected date: 11/20/2023    7. Paresthesias  Assessment & Plan:  Having some numbness/tingling in feet at night. Will check b12 level.     Orders:  -     VITAMIN B12; Future; Expected date: 11/20/2023    8. Screening mammogram for breast cancer  -     Mammo Digital Screening Bilat w/ Sunny; Future; Expected date: 12/05/2023    Other orders  -     fluticasone propionate (FLONASE) 50 mcg/actuation nasal spray; 2 sprays (100 mcg total) by Each Nostril route once daily.  Dispense: 16 g; Refill: 3         RTC in 6 months, sooner if needed and depending on labs.    Kt Lux MD  Department of Internal Medicine - [facility]  8:25 AM

## 2023-11-21 ENCOUNTER — TELEPHONE (OUTPATIENT)
Dept: INTERNAL MEDICINE | Facility: CLINIC | Age: 68
End: 2023-11-21
Payer: MEDICARE

## 2023-11-21 ENCOUNTER — LAB VISIT (OUTPATIENT)
Dept: LAB | Facility: HOSPITAL | Age: 68
End: 2023-11-21
Attending: HOSPITALIST
Payer: MEDICARE

## 2023-11-21 DIAGNOSIS — M81.6 LOCALIZED OSTEOPOROSIS, UNSPECIFIED PATHOLOGICAL FRACTURE PRESENCE: ICD-10-CM

## 2023-11-21 DIAGNOSIS — R20.2 PARESTHESIAS: ICD-10-CM

## 2023-11-21 DIAGNOSIS — E78.2 MIXED HYPERLIPIDEMIA: ICD-10-CM

## 2023-11-21 DIAGNOSIS — I10 ESSENTIAL HYPERTENSION: ICD-10-CM

## 2023-11-21 LAB
25(OH)D3+25(OH)D2 SERPL-MCNC: 25 NG/ML (ref 30–96)
ALBUMIN SERPL BCP-MCNC: 3.9 G/DL (ref 3.5–5.2)
ALP SERPL-CCNC: 69 U/L (ref 55–135)
ALT SERPL W/O P-5'-P-CCNC: 26 U/L (ref 10–44)
ANION GAP SERPL CALC-SCNC: 9 MMOL/L (ref 8–16)
AST SERPL-CCNC: 22 U/L (ref 10–40)
BASOPHILS # BLD AUTO: 0.05 K/UL (ref 0–0.2)
BASOPHILS NFR BLD: 0.6 % (ref 0–1.9)
BILIRUB SERPL-MCNC: 0.8 MG/DL (ref 0.1–1)
BUN SERPL-MCNC: 15 MG/DL (ref 8–23)
CALCIUM SERPL-MCNC: 9.8 MG/DL (ref 8.7–10.5)
CHLORIDE SERPL-SCNC: 96 MMOL/L (ref 95–110)
CHOLEST SERPL-MCNC: 181 MG/DL (ref 120–199)
CHOLEST/HDLC SERPL: 4.2 {RATIO} (ref 2–5)
CO2 SERPL-SCNC: 31 MMOL/L (ref 23–29)
CREAT SERPL-MCNC: 0.7 MG/DL (ref 0.5–1.4)
DIFFERENTIAL METHOD: ABNORMAL
EOSINOPHIL # BLD AUTO: 0.1 K/UL (ref 0–0.5)
EOSINOPHIL NFR BLD: 1.5 % (ref 0–8)
ERYTHROCYTE [DISTWIDTH] IN BLOOD BY AUTOMATED COUNT: 12.8 % (ref 11.5–14.5)
EST. GFR  (NO RACE VARIABLE): >60 ML/MIN/1.73 M^2
GLUCOSE SERPL-MCNC: 96 MG/DL (ref 70–110)
HCT VFR BLD AUTO: 44.5 % (ref 37–48.5)
HDLC SERPL-MCNC: 43 MG/DL (ref 40–75)
HDLC SERPL: 23.8 % (ref 20–50)
HGB BLD-MCNC: 15.4 G/DL (ref 12–16)
IMM GRANULOCYTES # BLD AUTO: 0.02 K/UL (ref 0–0.04)
IMM GRANULOCYTES NFR BLD AUTO: 0.2 % (ref 0–0.5)
LDLC SERPL CALC-MCNC: 119.2 MG/DL (ref 63–159)
LYMPHOCYTES # BLD AUTO: 2.6 K/UL (ref 1–4.8)
LYMPHOCYTES NFR BLD: 31.8 % (ref 18–48)
MAGNESIUM SERPL-MCNC: 1.9 MG/DL (ref 1.6–2.6)
MCH RBC QN AUTO: 28.5 PG (ref 27–31)
MCHC RBC AUTO-ENTMCNC: 34.6 G/DL (ref 32–36)
MCV RBC AUTO: 82 FL (ref 82–98)
MONOCYTES # BLD AUTO: 0.5 K/UL (ref 0.3–1)
MONOCYTES NFR BLD: 6.6 % (ref 4–15)
NEUTROPHILS # BLD AUTO: 4.8 K/UL (ref 1.8–7.7)
NEUTROPHILS NFR BLD: 59.3 % (ref 38–73)
NONHDLC SERPL-MCNC: 138 MG/DL
NRBC BLD-RTO: 0 /100 WBC
PHOSPHATE SERPL-MCNC: 3.7 MG/DL (ref 2.7–4.5)
PLATELET # BLD AUTO: 381 K/UL (ref 150–450)
PMV BLD AUTO: 8.8 FL (ref 9.2–12.9)
POTASSIUM SERPL-SCNC: 3.3 MMOL/L (ref 3.5–5.1)
PROT SERPL-MCNC: 6.8 G/DL (ref 6–8.4)
RBC # BLD AUTO: 5.41 M/UL (ref 4–5.4)
SODIUM SERPL-SCNC: 136 MMOL/L (ref 136–145)
TRIGL SERPL-MCNC: 94 MG/DL (ref 30–150)
TSH SERPL DL<=0.005 MIU/L-ACNC: 3.56 UIU/ML (ref 0.4–4)
VIT B12 SERPL-MCNC: 838 PG/ML (ref 210–950)
WBC # BLD AUTO: 8.07 K/UL (ref 3.9–12.7)

## 2023-11-21 PROCEDURE — 80053 COMPREHEN METABOLIC PANEL: CPT | Performed by: HOSPITALIST

## 2023-11-21 PROCEDURE — 80061 LIPID PANEL: CPT | Performed by: STUDENT IN AN ORGANIZED HEALTH CARE EDUCATION/TRAINING PROGRAM

## 2023-11-21 PROCEDURE — 36415 COLL VENOUS BLD VENIPUNCTURE: CPT | Performed by: HOSPITALIST

## 2023-11-21 PROCEDURE — 82306 VITAMIN D 25 HYDROXY: CPT | Performed by: HOSPITALIST

## 2023-11-21 PROCEDURE — 84443 ASSAY THYROID STIM HORMONE: CPT | Performed by: HOSPITALIST

## 2023-11-21 PROCEDURE — 83735 ASSAY OF MAGNESIUM: CPT | Performed by: HOSPITALIST

## 2023-11-21 PROCEDURE — 82607 VITAMIN B-12: CPT | Performed by: STUDENT IN AN ORGANIZED HEALTH CARE EDUCATION/TRAINING PROGRAM

## 2023-11-21 PROCEDURE — 85025 COMPLETE CBC W/AUTO DIFF WBC: CPT | Performed by: STUDENT IN AN ORGANIZED HEALTH CARE EDUCATION/TRAINING PROGRAM

## 2023-11-21 PROCEDURE — 84100 ASSAY OF PHOSPHORUS: CPT | Performed by: HOSPITALIST

## 2023-11-21 NOTE — TELEPHONE ENCOUNTER
Called PT. Informed PT that the labs the  ordered look good. Also informed PT that her B12 Is normal.

## 2023-11-21 NOTE — TELEPHONE ENCOUNTER
----- Message from Kt Lux MD sent at 11/21/2023  2:23 PM CST -----  Pls let patient know that the labs I ordered look good. B12 Is normal

## 2023-11-30 ENCOUNTER — PATIENT MESSAGE (OUTPATIENT)
Dept: INTERNAL MEDICINE | Facility: CLINIC | Age: 68
End: 2023-11-30

## 2023-11-30 ENCOUNTER — IMMUNIZATION (OUTPATIENT)
Dept: INTERNAL MEDICINE | Facility: CLINIC | Age: 68
End: 2023-11-30
Payer: MEDICARE

## 2023-11-30 DIAGNOSIS — E87.6 HYPOKALEMIA: Primary | ICD-10-CM

## 2023-11-30 DIAGNOSIS — Z23 NEED FOR VACCINATION: Primary | ICD-10-CM

## 2023-11-30 PROCEDURE — 90694 VACC AIIV4 NO PRSRV 0.5ML IM: CPT | Mod: S$GLB,,, | Performed by: INTERNAL MEDICINE

## 2023-11-30 PROCEDURE — 90694 FLU VACCINE - QUADRIVALENT - ADJUVANTED: ICD-10-PCS | Mod: S$GLB,,, | Performed by: INTERNAL MEDICINE

## 2023-11-30 PROCEDURE — G0008 FLU VACCINE - QUADRIVALENT - ADJUVANTED: ICD-10-PCS | Mod: S$GLB,,, | Performed by: INTERNAL MEDICINE

## 2023-11-30 PROCEDURE — G0008 ADMIN INFLUENZA VIRUS VAC: HCPCS | Mod: S$GLB,,, | Performed by: INTERNAL MEDICINE

## 2023-12-06 ENCOUNTER — IMMUNIZATION (OUTPATIENT)
Dept: INTERNAL MEDICINE | Facility: CLINIC | Age: 68
End: 2023-12-06
Payer: MEDICARE

## 2023-12-06 DIAGNOSIS — Z23 NEED FOR VACCINATION: Primary | ICD-10-CM

## 2023-12-06 PROCEDURE — 90480 COVID-19 VAC, MRNA 2023 (MODERNA)(PF) 50 MCG/0.5 ML IM SUSR (12+): ICD-10-PCS | Mod: S$GLB,,, | Performed by: INTERNAL MEDICINE

## 2023-12-06 PROCEDURE — 91322 COVID-19 VAC, MRNA 2023 (MODERNA)(PF) 50 MCG/0.5 ML IM SUSR (12+): ICD-10-PCS | Mod: S$GLB,,, | Performed by: INTERNAL MEDICINE

## 2023-12-06 PROCEDURE — 91322 SARSCOV2 VAC 50 MCG/0.5ML IM: CPT | Mod: S$GLB,,, | Performed by: INTERNAL MEDICINE

## 2023-12-06 PROCEDURE — 90480 ADMN SARSCOV2 VAC 1/ONLY CMP: CPT | Mod: S$GLB,,, | Performed by: INTERNAL MEDICINE

## 2023-12-13 PROBLEM — Z23 NEED FOR DIPHTHERIA-TETANUS-PERTUSSIS (TDAP) VACCINE: Status: RESOLVED | Noted: 2021-09-08 | Resolved: 2023-12-13

## 2023-12-13 PROBLEM — Z23 NEEDS FLU SHOT: Status: RESOLVED | Noted: 2021-09-08 | Resolved: 2023-12-13

## 2023-12-13 PROBLEM — E78.2 MIXED HYPERLIPIDEMIA: Status: ACTIVE | Noted: 2023-12-13

## 2023-12-13 PROBLEM — M81.6 LOCALIZED OSTEOPOROSIS: Status: RESOLVED | Noted: 2023-03-15 | Resolved: 2023-12-13

## 2023-12-13 RX ORDER — SEMAGLUTIDE 0.68 MG/ML
0.5 INJECTION, SOLUTION SUBCUTANEOUS
COMMUNITY
Start: 2023-09-20 | End: 2023-12-20

## 2023-12-13 NOTE — PROGRESS NOTES
The patient location is: [place]  The chief complaint leading to consultation is: Medicare Wellness Visit       Visit type: audiovisual     Face to Face time with patient: 21  60 minutes of total time spent on the encounter, which includes face to face time and non-face to face time preparing to see the patient (eg, review of tests), Obtaining and/or reviewing separately obtained history, Documenting clinical information in the electronic or other health record, Independently interpreting results (not separately reported) and communicating results to the patient/family/caregiver, or Care coordination (not separately reported).            Each patient to whom he or she provides medical services by telemedicine is:  (1) informed of the relationship between the physician and patient and the respective role of any other health care provider with respect to management of the patient; and (2) notified that he or she may decline to receive medical services by telemedicine and may withdraw from such care at any time.      Jyoti Tyler presented for a  Medicare AWV and comprehensive Health Risk Assessment today. The following components were reviewed and updated:    Medical history  Family History  Social history  Allergies and Current Medications  Health Risk Assessment  Health Maintenance  Care Team         ** See Completed Assessments for Annual Wellness Visit within the encounter summary.**         The following assessments were completed:  Living Situation  CAGE  Depression Screening  Timed Get Up and Go  Whisper Test  Cognitive Function Screening  Nutrition Screening  ADL Screening  PAQ Screening      Review for Opioid Screening: Pt does not have Rx for Opioids      Review for Substance Use Disorders: Patient does not use substance        Current Outpatient Medications:     fluticasone propionate (FLONASE) 50 mcg/actuation nasal spray, 2 sprays (100 mcg total) by Each Nostril route once daily., Disp: 16 g, Rfl: 3     "minoxidiL (LONITEN) 2.5 MG tablet, Take 1 tablet (2.5 mg total) by mouth once daily., Disp: 30 tablet, Rfl: 2    spironolactone-hydrochlorothiazide 25-25mg (ALDACTAZIDE) 25-25 mg Tab, Take 1 tablet by mouth once daily., Disp: 90 tablet, Rfl: 3    vitamin D (VITAMIN D3) 1000 units Tab, Take 1,000 Units by mouth once daily., Disp: , Rfl:          Vitals:    12/20/23 0957   Weight: 66.2 kg (146 lb)   Height: 5' 5" (1.651 m)   PainSc: 0-No pain      Physical Exam  Nursing note reviewed.   Constitutional:       General: She is not in acute distress.     Appearance: Normal appearance. She is not ill-appearing.   HENT:      Head: Normocephalic and atraumatic.   Eyes:      General: No scleral icterus.        Right eye: No discharge.         Left eye: No discharge.      Extraocular Movements: Extraocular movements intact.      Conjunctiva/sclera: Conjunctivae normal.   Pulmonary:      Effort: Pulmonary effort is normal. No respiratory distress.   Musculoskeletal:      Cervical back: Normal range of motion.   Neurological:      Mental Status: She is alert and oriented to person, place, and time.   Psychiatric:         Mood and Affect: Mood normal.         Behavior: Behavior normal.         Cognition and Memory: Cognition and memory normal.               Diagnoses and health risks identified today and associated recommendations/orders:    1. Encounter for preventive health examination  - Chart reviewed. Problem list updated. Discussed current medical diagnosis, current medications, medical/surgical/family/social history; updated provider list; documented vital signs; identified any cognitive impairment; and updated risk factor list. Addressed any outstanding health maintenance. Provided patient with personalized health advice. Continue to follow up with PCP and any specialists.     2. Mild carotid artery disease  Chronic; stable on current treatment plan; follow up with PCP    3. Age-related osteoporosis without current " pathological fracture  Chronic; stable on current treatment plan; follow up with PCP  - taking vit d supplements     4. Nuclear sclerosis, bilateral  Chronic; stable on current treatment plan; follow up with PCP  - follow up with ophthalmology     5. Essential hypertension  Chronic; stable on current treatment plan; follow up with PCP  - taking spironolactone- HCTZ    6. Vitamin D deficiency  Chronic; stable on current treatment plan; follow up with PCP  - taking vit d supplements    7. LPRD (laryngopharyngeal reflux disease)  Chronic; stable on current treatment plan; follow up with PCP    8. Mixed hyperlipidemia  Chronic; stable on current treatment plan; follow up with PCP    9. Hearing loss, unspecified hearing loss type, unspecified laterality  Chronic; effecting daily life, patient requesting referral for hearing evaluation; follow up with PCP  - Ambulatory referral/consult to Audiology; Future  - Ambulatory referral/consult to ENT; Future    10. Post-menopausal  Chronic; follow up with PCP  - patient requesting consult with GYN to discuss possibility for hormone replacement; has previously discussed hormone replacement with PCP  - Ambulatory referral/consult to Gynecology; Future    11. Counseling for hormone replacement therapy  Chronic; follow up with PCP  - patient requesting consult with GYN to discuss possibility for hormone replacement; has previously discussed hormone replacement with PCP  - Ambulatory referral/consult to Gynecology; Future    12. BMI 24.0-24.9, adult  - Recommendation for healthy diet and increasing exercise as tolerated with goal of 150min/week       Provided Jyoti with a 5-10 year written screening schedule and personal prevention plan. Recommendations were developed using the USPSTF age appropriate recommendations. Education, counseling, and referrals were provided as needed. After Visit Summary printed and given to patient which includes a list of additional screenings\tests  needed.    Follow up in about 1 year (around 12/20/2024) for your next annual wellness visit.    Dana Keenan, FNP-C    Advance Care Planning     I offered to discuss advanced care planning, including how to pick a person who would make decisions for you if you were unable to make them for yourself, called a health care power of , and what kind of decisions you might make such as use of life sustaining treatments such as ventilators and tube feeding when faced with a life limiting illness recorded on a living will that they will need to know. (How you want to be cared for as you near the end of your natural life)     X Patient is interested in learning more about how to make advanced directives.  I provided them paperwork and offered to discuss this with them.

## 2023-12-14 ENCOUNTER — PATIENT MESSAGE (OUTPATIENT)
Dept: ADMINISTRATIVE | Facility: CLINIC | Age: 68
End: 2023-12-14
Payer: MEDICARE

## 2023-12-18 ENCOUNTER — TELEPHONE (OUTPATIENT)
Dept: ADMINISTRATIVE | Facility: CLINIC | Age: 68
End: 2023-12-18
Payer: MEDICARE

## 2023-12-20 ENCOUNTER — TELEPHONE (OUTPATIENT)
Dept: ADMINISTRATIVE | Facility: CLINIC | Age: 68
End: 2023-12-20
Payer: MEDICARE

## 2023-12-20 ENCOUNTER — OFFICE VISIT (OUTPATIENT)
Dept: FAMILY MEDICINE | Facility: CLINIC | Age: 68
End: 2023-12-20
Payer: MEDICARE

## 2023-12-20 VITALS — WEIGHT: 146 LBS | HEIGHT: 65 IN | BODY MASS INDEX: 24.32 KG/M2

## 2023-12-20 DIAGNOSIS — Z00.00 ENCOUNTER FOR PREVENTIVE HEALTH EXAMINATION: Primary | ICD-10-CM

## 2023-12-20 DIAGNOSIS — I10 ESSENTIAL HYPERTENSION: ICD-10-CM

## 2023-12-20 DIAGNOSIS — H91.90 HEARING LOSS, UNSPECIFIED HEARING LOSS TYPE, UNSPECIFIED LATERALITY: ICD-10-CM

## 2023-12-20 DIAGNOSIS — K21.9 LPRD (LARYNGOPHARYNGEAL REFLUX DISEASE): ICD-10-CM

## 2023-12-20 DIAGNOSIS — Z78.0 POST-MENOPAUSAL: ICD-10-CM

## 2023-12-20 DIAGNOSIS — E78.2 MIXED HYPERLIPIDEMIA: ICD-10-CM

## 2023-12-20 DIAGNOSIS — I77.9 MILD CAROTID ARTERY DISEASE: ICD-10-CM

## 2023-12-20 DIAGNOSIS — H25.13 NUCLEAR SCLEROSIS, BILATERAL: ICD-10-CM

## 2023-12-20 DIAGNOSIS — Z71.89 COUNSELING FOR HORMONE REPLACEMENT THERAPY: ICD-10-CM

## 2023-12-20 DIAGNOSIS — E55.9 VITAMIN D DEFICIENCY: ICD-10-CM

## 2023-12-20 DIAGNOSIS — M81.0 AGE-RELATED OSTEOPOROSIS WITHOUT CURRENT PATHOLOGICAL FRACTURE: ICD-10-CM

## 2023-12-20 PROCEDURE — 1160F PR REVIEW ALL MEDS BY PRESCRIBER/CLIN PHARMACIST DOCUMENTED: ICD-10-PCS | Mod: CPTII,95,, | Performed by: NURSE PRACTITIONER

## 2023-12-20 PROCEDURE — 3288F PR FALLS RISK ASSESSMENT DOCUMENTED: ICD-10-PCS | Mod: CPTII,95,, | Performed by: NURSE PRACTITIONER

## 2023-12-20 PROCEDURE — 1101F PT FALLS ASSESS-DOCD LE1/YR: CPT | Mod: CPTII,95,, | Performed by: NURSE PRACTITIONER

## 2023-12-20 PROCEDURE — 1160F RVW MEDS BY RX/DR IN RCRD: CPT | Mod: CPTII,95,, | Performed by: NURSE PRACTITIONER

## 2023-12-20 PROCEDURE — 1126F AMNT PAIN NOTED NONE PRSNT: CPT | Mod: CPTII,95,, | Performed by: NURSE PRACTITIONER

## 2023-12-20 PROCEDURE — G0439 PR MEDICARE ANNUAL WELLNESS SUBSEQUENT VISIT: ICD-10-PCS | Mod: 95,,, | Performed by: NURSE PRACTITIONER

## 2023-12-20 PROCEDURE — 1126F PR PAIN SEVERITY QUANTIFIED, NO PAIN PRESENT: ICD-10-PCS | Mod: CPTII,95,, | Performed by: NURSE PRACTITIONER

## 2023-12-20 PROCEDURE — 1170F PR FUNCTIONAL STATUS ASSESSED: ICD-10-PCS | Mod: CPTII,95,, | Performed by: NURSE PRACTITIONER

## 2023-12-20 PROCEDURE — 1170F FXNL STATUS ASSESSED: CPT | Mod: CPTII,95,, | Performed by: NURSE PRACTITIONER

## 2023-12-20 PROCEDURE — G0439 PPPS, SUBSEQ VISIT: HCPCS | Mod: 95,,, | Performed by: NURSE PRACTITIONER

## 2023-12-20 PROCEDURE — 1159F MED LIST DOCD IN RCRD: CPT | Mod: CPTII,95,, | Performed by: NURSE PRACTITIONER

## 2023-12-20 PROCEDURE — 3288F FALL RISK ASSESSMENT DOCD: CPT | Mod: CPTII,95,, | Performed by: NURSE PRACTITIONER

## 2023-12-20 PROCEDURE — 1159F PR MEDICATION LIST DOCUMENTED IN MEDICAL RECORD: ICD-10-PCS | Mod: CPTII,95,, | Performed by: NURSE PRACTITIONER

## 2023-12-20 PROCEDURE — 1101F PR PT FALLS ASSESS DOC 0-1 FALLS W/OUT INJ PAST YR: ICD-10-PCS | Mod: CPTII,95,, | Performed by: NURSE PRACTITIONER

## 2023-12-20 RX ORDER — CHOLECALCIFEROL (VITAMIN D3) 25 MCG
1000 TABLET ORAL DAILY
COMMUNITY

## 2024-02-02 ENCOUNTER — OFFICE VISIT (OUTPATIENT)
Dept: OBSTETRICS AND GYNECOLOGY | Facility: CLINIC | Age: 69
End: 2024-02-02
Payer: MEDICARE

## 2024-02-02 VITALS
HEIGHT: 65 IN | WEIGHT: 150 LBS | BODY MASS INDEX: 24.99 KG/M2 | SYSTOLIC BLOOD PRESSURE: 135 MMHG | DIASTOLIC BLOOD PRESSURE: 76 MMHG

## 2024-02-02 DIAGNOSIS — N95.1 MENOPAUSAL SYMPTOMS: Primary | ICD-10-CM

## 2024-02-02 DIAGNOSIS — Z78.0 POST-MENOPAUSAL: ICD-10-CM

## 2024-02-02 DIAGNOSIS — Z71.89 COUNSELING FOR HORMONE REPLACEMENT THERAPY: ICD-10-CM

## 2024-02-02 DIAGNOSIS — I10 ESSENTIAL HYPERTENSION: ICD-10-CM

## 2024-02-02 PROCEDURE — 99999 PR PBB SHADOW E&M-EST. PATIENT-LVL IV: CPT | Mod: PBBFAC,,, | Performed by: PHYSICIAN ASSISTANT

## 2024-02-02 PROCEDURE — 99214 OFFICE O/P EST MOD 30 MIN: CPT | Mod: S$GLB,,, | Performed by: PHYSICIAN ASSISTANT

## 2024-02-02 RX ORDER — FLUTICASONE PROPIONATE 50 MCG
2 SPRAY, SUSPENSION (ML) NASAL
Qty: 48 ML | Refills: 3 | Status: SHIPPED | OUTPATIENT
Start: 2024-02-02

## 2024-02-02 NOTE — PROGRESS NOTES
Subjective:      Jyoti Tyler is a 68 y.o. female who presents for HRT consult. Menarche at age 14. She is . Menopause in her mid 40s and started on HRT shortly after. Was taking oral E/P. Lost health insurance in 2016 and stopped HRT at that time, about 7-8 years ago. She did try the estradiol patch for a short period but cause localized reaction. Recently saw news show about benefits of HRT and interested in restarting. She was recently diagnosed with osteoporosis, still having hot flashes and insomnia. She does have a history of HTN that is controlled.  No other cardiac history or personal history of gestational DM/pre-eclampsia. She denies the following contraindications to HRT:  Vaginal bleeding, history of VTE/PE, thrombophilia,  breast cancer, or active liver disease.     She did have CT Cardiac scan on 7/15/2016 with score of 0.     PCP: Kt Lux MD    Routine labs: 2023  WWE: 2022 with Dr. Theodore  Pap smear: No result found  Mammogram: 2022 TC 3.1 %   DEXA: 2023 osteoporosis left hip- declines tx that this time  Colonoscopy: 3/8/2022 repeat in 7 years    Lab Visit on 2023   Component Date Value Ref Range Status    Vit D, 25-Hydroxy 2023 25 (L)  30 - 96 ng/mL Final    TSH 2023 3.557  0.400 - 4.000 uIU/mL Final    Sodium 2023 136  136 - 145 mmol/L Final    Potassium 2023 3.3 (L)  3.5 - 5.1 mmol/L Final    Chloride 2023 96  95 - 110 mmol/L Final    CO2 2023 31 (H)  23 - 29 mmol/L Final    Glucose 2023 96  70 - 110 mg/dL Final    BUN 2023 15  8 - 23 mg/dL Final    Creatinine 2023 0.7  0.5 - 1.4 mg/dL Final    Calcium 2023 9.8  8.7 - 10.5 mg/dL Final    Total Protein 2023 6.8  6.0 - 8.4 g/dL Final    Albumin 2023 3.9  3.5 - 5.2 g/dL Final    Total Bilirubin 2023 0.8  0.1 - 1.0 mg/dL Final    Alkaline Phosphatase 2023 69  55 - 135 U/L Final    AST 2023 22  10 - 40 U/L Final    ALT  11/21/2023 26  10 - 44 U/L Final    eGFR 11/21/2023 >60.0  >60 mL/min/1.73 m^2 Final    Anion Gap 11/21/2023 9  8 - 16 mmol/L Final    Magnesium 11/21/2023 1.9  1.6 - 2.6 mg/dL Final    Phosphorus 11/21/2023 3.7  2.7 - 4.5 mg/dL Final    Cholesterol 11/21/2023 181  120 - 199 mg/dL Final    Triglycerides 11/21/2023 94  30 - 150 mg/dL Final    HDL 11/21/2023 43  40 - 75 mg/dL Final    LDL Cholesterol 11/21/2023 119.2  63.0 - 159.0 mg/dL Final    HDL/Cholesterol Ratio 11/21/2023 23.8  20.0 - 50.0 % Final    Total Cholesterol/HDL Ratio 11/21/2023 4.2  2.0 - 5.0 Final    Non-HDL Cholesterol 11/21/2023 138  mg/dL Final    WBC 11/21/2023 8.07  3.90 - 12.70 K/uL Final    RBC 11/21/2023 5.41 (H)  4.00 - 5.40 M/uL Final    Hemoglobin 11/21/2023 15.4  12.0 - 16.0 g/dL Final    Hematocrit 11/21/2023 44.5  37.0 - 48.5 % Final    MCV 11/21/2023 82  82 - 98 fL Final    MCH 11/21/2023 28.5  27.0 - 31.0 pg Final    MCHC 11/21/2023 34.6  32.0 - 36.0 g/dL Final    RDW 11/21/2023 12.8  11.5 - 14.5 % Final    Platelets 11/21/2023 381  150 - 450 K/uL Final    MPV 11/21/2023 8.8 (L)  9.2 - 12.9 fL Final    Immature Granulocytes 11/21/2023 0.2  0.0 - 0.5 % Final    Gran # (ANC) 11/21/2023 4.8  1.8 - 7.7 K/uL Final    Immature Grans (Abs) 11/21/2023 0.02  0.00 - 0.04 K/uL Final    Lymph # 11/21/2023 2.6  1.0 - 4.8 K/uL Final    Mono # 11/21/2023 0.5  0.3 - 1.0 K/uL Final    Eos # 11/21/2023 0.1  0.0 - 0.5 K/uL Final    Baso # 11/21/2023 0.05  0.00 - 0.20 K/uL Final    nRBC 11/21/2023 0  0 /100 WBC Final    Gran % 11/21/2023 59.3  38.0 - 73.0 % Final    Lymph % 11/21/2023 31.8  18.0 - 48.0 % Final    Mono % 11/21/2023 6.6  4.0 - 15.0 % Final    Eosinophil % 11/21/2023 1.5  0.0 - 8.0 % Final    Basophil % 11/21/2023 0.6  0.0 - 1.9 % Final    Differential Method 11/21/2023 Automated   Final    Vitamin B-12 11/21/2023 838  210 - 950 pg/mL Final       Past Medical History:   Diagnosis Date    Anisometropia     Cataract     HTN (hypertension)  2012    Hyperopia     OD    Hypertension     Osteoporosis screening      Past Surgical History:   Procedure Laterality Date    CATARACT EXTRACTION W/  INTRAOCULAR LENS IMPLANT Right 9/3/2020    Procedure: EXTRACTION, CATARACT, WITH IOL INSERTION;  Surgeon: Ambika Begum MD;  Location: [facility] OR;  Service: Ophthalmology;  Laterality: Right;    CATARACT EXTRACTION W/  INTRAOCULAR LENS IMPLANT Left 2020    Procedure: EXTRACTION, CATARACT, WITH IOL INSERTION;  Surgeon: Ambika Begum MD;  Location: [facility] OR;  Service: Ophthalmology;  Laterality: Left;    CHOLECYSTECTOMY      COLONOSCOPY N/A 3/8/2022    Procedure: COLONOSCOPY;  Surgeon: MENA Patel MD;  Location: [facility] ENDO (4TH FLR);  Service: Endoscopy;  Laterality: N/A;  prep ins. emailed / COVID screening [place] 3/5/22- [facility]    EYE SURGERY       Social History     Tobacco Use    Smoking status: Former     Current packs/day: 0.00     Average packs/day: 0.3 packs/day for 10.0 years (2.5 ttl pk-yrs)     Types: Cigarettes     Start date: 1975     Quit date: 1985     Years since quittin.1    Smokeless tobacco: Never   Substance Use Topics    Alcohol use: Yes     Alcohol/week: 4.0 standard drinks of alcohol     Types: 4 Glasses of wine per week     Comment: socially, glass of wine daily    Drug use: Never     Family History   Problem Relation Age of Onset    Cataracts Mother     Macular degeneration Mother     Hypertension Mother     Cataracts Father     Hypertension Father     Heart attack Father     No Known Problems Sister     No Known Problems Son     No Known Problems Son     Heart disease Paternal Uncle      OB History    Para Term  AB Living   2 2 2     2   SAB IAB Ectopic Multiple Live Births           2      # Outcome Date GA Lbr Jno/2nd Weight Sex Delivery Anes PTL Lv   2 Term         BETH   1 Term         BETH       Current Outpatient Medications:     fluticasone propionate (FLONASE) 50 mcg/actuation nasal spray, 2 sprays  "(100 mcg total) by Each Nostril route once daily., Disp: 16 g, Rfl: 3    spironolactone-hydrochlorothiazide 25-25mg (ALDACTAZIDE) 25-25 mg Tab, Take 1 tablet by mouth once daily., Disp: 90 tablet, Rfl: 3    vitamin D (VITAMIN D3) 1000 units Tab, Take 1,000 Units by mouth once daily., Disp: , Rfl:     minoxidiL (LONITEN) 2.5 MG tablet, Take 1 tablet (2.5 mg total) by mouth once daily., Disp: 30 tablet, Rfl: 2    The 10-year ASCVD risk score (Shawna ROBLERO, et al., 2019) is: 9.6%    Values used to calculate the score:      Age: 68 years      Sex: Female      Is Non- : No      Diabetic: No      Tobacco smoker: No      Systolic Blood Pressure: 135 mmHg      Is BP treated: No      HDL Cholesterol: 43 mg/dL      Total Cholesterol: 181 mg/dL    Review of Systems:  General: No fever, chills, or weight loss.  Chest: No chest pain, shortness of breath, or palpitations.  Breast: No pain, masses, or nipple discharge.  Vulva: No pain, lesions, or itching.  Vagina: No relaxation, itching, discharge, or lesions.  Abdomen: No pain, nausea, vomiting, diarrhea, or constipation.  Urinary: No incontinence, nocturia, frequency, or dysuria.  Extremities:  No leg cramps, edema, or calf pain.  Neurologic: No headaches, dizziness, or visual changes.    Objective:     Vitals:    02/02/24 1344   BP: 135/76   Weight: 68 kg (150 lb)   Height: 5' 5" (1.651 m)   PainSc: 0-No pain     Body mass index is 24.96 kg/m².      Physical Exam: Deferred      Assessment:      Menopausal symptoms: Risks and benefits of hormone replacement therapy were discussed. Discussed increased cardiovascular risk with advancing age. 10-year ASCVD risk score (Shawna ROBLERO, et al., 2019) is: 9.6% Will need clearance with cardiology prior to restarting HRT. We did discuss that transdermal option of estrogen is safer than oral estradiol as transdermal methods decrease risk for blood clots and stroke as they bypass liver metabolism. She would like prefer " estradiol gel vs patch. We also discussed HRT and breast cancer. Breast cancer is very common, affecting 1/8 women regardless of HRT status.   -     Ambulatory referral/consult to Cardiology; Future; Expected date: 02/09/2024    Post-menopausal  -     Ambulatory referral/consult to Gynecology    Counseling for hormone replacement therapy  -     Ambulatory referral/consult to Gynecology    Essential hypertension  -     Ambulatory referral/consult to Cardiology; Future; Expected date: 02/09/2024        Plan:   Referral to cardiology for clearance to restart estradiol.  If cleared, will start divigel 0.25mg daily and progesterone 100mg QHS  Counseled on use, common side effects and risks.  Schedule annual mammogram.  Will follow up pending.    Instructed patient to call if she experiences any side effects or has any questions.    I spent a total of 30 minutes on the day of the visit.This includes face to face time and non-face to face time preparing to see the patient (eg, review of tests), obtaining and/or reviewing separately obtained history, documenting clinical information in the electronic or other health record, independently interpreting results and communicating results to the patient/family/caregiver, or care coordinator.

## 2024-02-02 NOTE — TELEPHONE ENCOUNTER
No care due was identified.  [facility] Embedded Care Due Messages. Reference number: [id number].   2/02/2024 2:46:36 PM CST

## 2024-02-02 NOTE — TELEPHONE ENCOUNTER
Refill Decision Note   Jyoti Nayeli  is requesting a refill authorization.  Brief Assessment and Rationale for Refill:  Approve     Medication Therapy Plan:         Comments:     Note composed:5:27 PM 02/02/2024

## 2024-02-14 ENCOUNTER — PATIENT MESSAGE (OUTPATIENT)
Dept: PRIMARY CARE CLINIC | Facility: CLINIC | Age: 69
End: 2024-02-14
Payer: MEDICARE

## 2024-03-06 PROBLEM — Z78.0 MENOPAUSE: Status: ACTIVE | Noted: 2024-03-06

## 2024-03-07 ENCOUNTER — OFFICE VISIT (OUTPATIENT)
Dept: CARDIOLOGY | Facility: CLINIC | Age: 69
End: 2024-03-07
Payer: MEDICARE

## 2024-03-07 VITALS
OXYGEN SATURATION: 100 % | BODY MASS INDEX: 25.01 KG/M2 | SYSTOLIC BLOOD PRESSURE: 108 MMHG | HEART RATE: 74 BPM | DIASTOLIC BLOOD PRESSURE: 55 MMHG | HEIGHT: 65 IN | WEIGHT: 150.13 LBS

## 2024-03-07 DIAGNOSIS — Z13.6 ENCOUNTER FOR SCREENING FOR CARDIOVASCULAR DISORDERS: ICD-10-CM

## 2024-03-07 DIAGNOSIS — E78.2 MIXED HYPERLIPIDEMIA: Primary | ICD-10-CM

## 2024-03-07 DIAGNOSIS — N95.1 MENOPAUSAL SYMPTOMS: ICD-10-CM

## 2024-03-07 DIAGNOSIS — I77.9 MILD CAROTID ARTERY DISEASE: ICD-10-CM

## 2024-03-07 DIAGNOSIS — R00.2 PALPITATIONS: ICD-10-CM

## 2024-03-07 DIAGNOSIS — Z78.0 MENOPAUSE: ICD-10-CM

## 2024-03-07 DIAGNOSIS — I10 ESSENTIAL HYPERTENSION: ICD-10-CM

## 2024-03-07 PROCEDURE — 99999 PR PBB SHADOW E&M-EST. PATIENT-LVL IV: CPT | Mod: PBBFAC,GC,, | Performed by: INTERNAL MEDICINE

## 2024-03-07 PROCEDURE — 99204 OFFICE O/P NEW MOD 45 MIN: CPT | Mod: GC,S$GLB,, | Performed by: INTERNAL MEDICINE

## 2024-03-07 RX ORDER — ROSUVASTATIN CALCIUM 5 MG/1
5 TABLET, COATED ORAL DAILY
Qty: 90 TABLET | Refills: 3 | Status: SHIPPED | OUTPATIENT
Start: 2024-03-07 | End: 2025-03-07

## 2024-03-07 NOTE — PROGRESS NOTES
Jyoti Tyler is a 68 y.o. female here for follow-up for pre-HRT risk stratification.     Referral from [facility].  Cardiac CT with calcium score of 0 on 07/15/2016.   Patient is having profound menopausal symptoms and is seeking to start estradiol. Referral was placed for concern of hypertension control with use of HRT (estradiol).   Food tours, weight lifting 2x /week, 99840 steps a week. Denies chest discomfort, shortness of breath. Reports palpitations that sometimes bother her. She states that they come about randomly and checks her pulse and notices a sharp elevation in her pulse rate as compared to baseline. She has an Apple Watch but does not have ECG function on the watch.   Denies syncopal episodes, dizziness, headaches, claudication symptoms.    Active Ambulatory Problems     Diagnosis Date Noted    Skin lesion 03/31/2016    S/P cholecystectomy 03/31/2016    LPRD (laryngopharyngeal reflux disease) 07/12/2017    Long-term use of high-risk medication, PPI 07/12/2017    Essential hypertension 07/08/2020    Chronic allergic rhinitis 07/08/2020    Hypokalemia 07/10/2020    Nuclear sclerosis, bilateral 09/03/2020    Nuclear sclerotic cataract of left eye 09/21/2020    Rotator cuff tendinitis, left 09/08/2021    Nontraumatic tear of left rotator cuff 09/08/2021    Mild carotid artery disease 01/04/2022    Female pattern hair loss 06/20/2022    Age-related osteoporosis without current pathological fracture 03/15/2023    Vitamin D deficiency 03/15/2023    Paresthesias 11/20/2023    Mixed hyperlipidemia 12/13/2023    Menopause 03/06/2024     Resolved Ambulatory Problems     Diagnosis Date Noted    Diarrhea 11/07/2012    Annual physical exam 01/14/2014    Atrophic vaginitis 03/11/2015    Encounter to establish care with new doctor 03/31/2016    Osteopenia 07/12/2017    Decreased range of motion of left shoulder 11/23/2020    Impaired strength of shoulder muscles 11/23/2020    Acute pain of left  shoulder 11/23/2020    Posture abnormality 11/23/2020    Need for diphtheria-tetanus-pertussis (Tdap) vaccine 09/08/2021    Needs flu shot 09/08/2021    Class 1 obesity in adult 09/08/2021    Localized osteoporosis 03/15/2023     Past Medical History:   Diagnosis Date    Anisometropia     Cataract     HTN (hypertension) 11/7/2012    Hyperopia     Hypertension     Osteoporosis screening         Review of patient's allergies indicates:   Allergen Reactions    No known drug allergies         Current Outpatient Medications   Medication Instructions    fluticasone propionate (FLONASE) 100 mcg, Each Nostril    minoxidiL (LONITEN) 2.5 mg, Oral, Daily    spironolactone-hydrochlorothiazide 25-25mg (ALDACTAZIDE) 25-25 mg Tab 1 tablet, Oral, Daily    vitamin D (VITAMIN D3) 1,000 Units, Oral, Daily       Past Surgical History:   Procedure Laterality Date    CATARACT EXTRACTION W/  INTRAOCULAR LENS IMPLANT Right 9/3/2020    Procedure: EXTRACTION, CATARACT, WITH IOL INSERTION;  Surgeon: Ambika Begum MD;  Location: [facility];  Service: Ophthalmology;  Laterality: Right;    CATARACT EXTRACTION W/  INTRAOCULAR LENS IMPLANT Left 9/21/2020    Procedure: EXTRACTION, CATARACT, WITH IOL INSERTION;  Surgeon: Ambika Begum MD;  Location: [facility] OR;  Service: Ophthalmology;  Laterality: Left;    CHOLECYSTECTOMY      COLONOSCOPY N/A 3/8/2022    Procedure: COLONOSCOPY;  Surgeon: MENA Patel MD;  Location: [address]);  Service: Endoscopy;  Laterality: N/A;  prep ins. emailed / COVID screening [place] 3/5/22- [facility]    EYE SURGERY          Family History   Problem Relation Age of Onset    Cataracts Mother     Macular degeneration Mother     Hypertension Mother     Cataracts Father     Hypertension Father     Heart attack Father     No Known Problems Sister     No Known Problems Son     No Known Problems Son     Heart disease Paternal Uncle         Social History     Socioeconomic History    Marital status:    Occupational  "History    Occupation:    Tobacco Use    Smoking status: Former     Current packs/day: 0.00     Average packs/day: 0.3 packs/day for 10.0 years (2.5 ttl pk-yrs)     Types: Cigarettes     Start date: 1975     Quit date: 1985     Years since quittin.1    Smokeless tobacco: Never   Substance and Sexual Activity    Alcohol use: Yes     Alcohol/week: 4.0 standard drinks of alcohol     Types: 4 Glasses of wine per week     Comment: socially, glass of wine daily    Drug use: Never    Sexual activity: Not Currently     Partners: Male   Social History Narrative    2015    She got  on     She is to smoke cigarettes but stopped when she was 29 years old    She took birth control pills while smoking    Happy in her employment as a             Lifts weights twice weekly.            The following portions of the patient's history were reviewed and updated as appropriate: allergies, current medications, past family history, past medical history, past social history, past surgical history, and problem list.     Review of Systems  Review of Systems   Constitutional:  Negative for chills, fever and malaise/fatigue.   HENT:  Negative for congestion and sore throat.    Respiratory:  Negative for cough and shortness of breath.    Cardiovascular:  Negative for chest pain, palpitations, orthopnea, leg swelling and PND.   Gastrointestinal:  Negative for abdominal pain, constipation, diarrhea, nausea and vomiting.   Genitourinary:  Negative for frequency.   Neurological:  Negative for weakness and headaches.         Objective:     Vitals:    24 0814   BP: (!) 108/55   Pulse: 74   SpO2: 100%   Weight: 68.1 kg (150 lb 2.1 oz)   Height: 5' 4.96" (1.65 m)   PainSc: 0-No pain        Physical Exam:  Physical Exam  Vitals and nursing note reviewed.   Constitutional:       General: She is not in acute distress.     Appearance: Normal appearance. She is not toxic-appearing.   HENT:      " Head: Normocephalic and atraumatic.      Mouth/Throat:      Mouth: Mucous membranes are moist.   Cardiovascular:      Rate and Rhythm: Normal rate and regular rhythm.      Heart sounds: No murmur heard.     No friction rub. No gallop.   Pulmonary:      Effort: Pulmonary effort is normal. No respiratory distress.      Breath sounds: Normal breath sounds.   Abdominal:      Palpations: Abdomen is soft.   Musculoskeletal:      Right lower leg: No edema.      Left lower leg: No edema.   Skin:     General: Skin is warm.   Neurological:      Mental Status: She is alert and oriented to person, place, and time. Mental status is at baseline.         Lab Review   Lab Results   Component Value Date     11/21/2023    K 3.3 (L) 11/21/2023    CL 96 11/21/2023    CO2 31 (H) 11/21/2023    BUN 15 11/21/2023    CREATININE 0.7 11/21/2023    CALCIUM 9.8 11/21/2023     Lab Results   Component Value Date    WBC 8.07 11/21/2023    HGB 15.4 11/21/2023    HCT 44.5 11/21/2023    MCV 82 11/21/2023     11/21/2023     Lab Results   Component Value Date    CHOL 181 11/21/2023    TRIG 94 11/21/2023    HDL 43 11/21/2023          Assessment:        1. Mixed hyperlipidemia    2. Mild carotid artery disease    3. Menopause    4. Essential hypertension    5. Menopausal symptoms    6. Encounter for screening for cardiovascular disorders    7. Palpitations          Plan:     HTN  Enrolled in Dig HTN  Continue minoxidil 2.5 (hair growth effect desired)  Continue spironolactone-HCTZ combination 25-25 mg PO daily  BP at goal  Encourage patient to eat more potassium, repeat potassium check pending   Dietary sodium restriction.  Regular aerobic exercise.  Check blood pressures daily and record.     Palpitations  Reports random occurs where HR increases, symptomatic from this  30-day event monitor (symptoms not frequent enough)    Mixed HLD  Mild carotid disease  Calcium score 0 on cardiac CT 07/15/2016  Continue lifestyle modifications to control  risk factors  ASCVD 11.1% with BP at home used   Patient would prefer to obtain repeat calcium score, cost advised  Discussed red yeast rice vs statin and patient preferred less pill burden with statin  After counseling and shared decision making, will also start rosuvastatin 5 mg PO HS + lipid panel in 3 months  Goal LDL < 100 mg/dL , follow up on lipid panel    Menopause  Currently seeking HRT with ObGyn with estradiol and progesterone combination   ObGYn referred for concerns of secondary HTN  Unlikely that HRT alone with cause increased risk of MI but will optimize cardiovascular risk factors to decrease risk of ASCVD events in future    F/u 6 months    Staff: Dr Esquivel    Thank you for your consultation. Please call with questions or concerns.     Juan Manuel Woody MD  Cardiology PGY6  [facility]

## 2024-03-07 NOTE — ASSESSMENT & PLAN NOTE
Currently seeking HRT with ObGyn with estradiol and progesterone combination   ObGYn referred for concerns of secondary HTN

## 2024-03-11 ENCOUNTER — OFFICE VISIT (OUTPATIENT)
Dept: OTOLARYNGOLOGY | Facility: CLINIC | Age: 69
End: 2024-03-11
Payer: MEDICARE

## 2024-03-11 ENCOUNTER — CLINICAL SUPPORT (OUTPATIENT)
Dept: AUDIOLOGY | Facility: CLINIC | Age: 69
End: 2024-03-11
Payer: MEDICARE

## 2024-03-11 DIAGNOSIS — H90.5: ICD-10-CM

## 2024-03-11 DIAGNOSIS — H90.3 SENSORINEURAL HEARING LOSS (SNHL) OF BOTH EARS: ICD-10-CM

## 2024-03-11 PROCEDURE — 92557 COMPREHENSIVE HEARING TEST: CPT | Mod: S$GLB,,,

## 2024-03-11 PROCEDURE — 99203 OFFICE O/P NEW LOW 30 MIN: CPT | Mod: S$GLB,,, | Performed by: OTOLARYNGOLOGY

## 2024-03-11 PROCEDURE — 99999 PR PBB SHADOW E&M-EST. PATIENT-LVL III: CPT | Mod: PBBFAC,,, | Performed by: OTOLARYNGOLOGY

## 2024-03-11 PROCEDURE — 99999 PR PBB SHADOW E&M-EST. PATIENT-LVL II: CPT | Mod: PBBFAC,,,

## 2024-03-11 PROCEDURE — 92567 TYMPANOMETRY: CPT | Mod: S$GLB,,,

## 2024-03-11 NOTE — PROGRESS NOTES
Jyoti Tyler was seen today in the clinic for an audiologic evaluation.  Patient's main complaint was decreased hearing bilaterally.  Mrs. Tyler reported her hearing changes have been progressive and seem to affect both ears equally. She denied otalgia, aural fullness, tinnitus, and vertigo today.    Tympanometry revealed Type A in the right ear and Type A in the left ear.     Audiogram results revealed mild to moderately severe sensorineural hearing loss bilaterally.      Speech reception thresholds were noted at 25 dB in the right ear and 20 dB in the left ear.    Speech discrimination scores were 96% in the right ear and 96% in the left ear.    Recommendations:  Otologic evaluation  Annual audiogram  Hearing protection when in noise  Hearing aid consultation

## 2024-03-11 NOTE — PROGRESS NOTES
[facility]    Subjective:      Patient: Jyoti Tyler Patient PCP: Kt Lux MD         :  1955     Sex:  female      MRN:  [id number]          Date of Visit: 2024      Chief Complaint: Hearing Loss (Patient stated that she has been having difficulty hearing for a couple years. Interested in Hearing aids )      Patient ID: Jyoti Tyler is a 68 y.o. female     Patient is a  former smoker with a past medical history of subjective allergy, HLD, HTN, mild carotid artery disease but no diabetes or immunosuppression and no history of diagnose dermatitis or ear disease seen today referred to me by Dana Keenan in consultation for hearing loss.  Meyers worsening over the years.  Has not previously been fitted for hearing aids.  No prior audiogram.  Audiologic Testing today with mild sloping to moderately severe hearing loss bilaterally with a 25 dB left and 35 dB right threshold at 2000 hertz with a 25 dB right and 20 dB left SRT and 96% discrimination scores bilaterally indicating good word understanding with normal tympanometry bilaterally.      Labs:  WBC   Date Value Ref Range Status   2023 8.07 3.90 - 12.70 K/uL Final     Hemoglobin   Date Value Ref Range Status   2023 15.4 12.0 - 16.0 g/dL Final     Platelets   Date Value Ref Range Status   2023 381 150 - 450 K/uL Final     Creatinine   Date Value Ref Range Status   2023 0.7 0.5 - 1.4 mg/dL Final     TSH   Date Value Ref Range Status   2023 3.557 0.400 - 4.000 uIU/mL Final       Past Medical History  She has a past medical history of Anisometropia, Cataract, HTN (hypertension), Hyperopia, Hypertension, and Osteoporosis screening.    Family / Surgical / Social History  Her family history includes Cataracts in her father and mother; Heart attack in her father; Heart disease in her paternal uncle; Hypertension in her father and mother; Macular degeneration in her mother; No Known Problems in her sister,  "son, and son.    Past Surgical History:   Procedure Laterality Date    CATARACT EXTRACTION W/  INTRAOCULAR LENS IMPLANT Right 9/3/2020    Procedure: EXTRACTION, CATARACT, WITH IOL INSERTION;  Surgeon: Ambika Begum MD;  Location: [facility] OR;  Service: Ophthalmology;  Laterality: Right;    CATARACT EXTRACTION W/  INTRAOCULAR LENS IMPLANT Left 2020    Procedure: EXTRACTION, CATARACT, WITH IOL INSERTION;  Surgeon: Ambika Begum MD;  Location: [facility] OR;  Service: Ophthalmology;  Laterality: Left;    CHOLECYSTECTOMY      COLONOSCOPY N/A 3/8/2022    Procedure: COLONOSCOPY;  Surgeon: MENA Patel MD;  Location: [facility] ENDO (4TH FLR);  Service: Endoscopy;  Laterality: N/A;  prep ins. emailed / COVID screening [place] 3/5/22- [facility]    EYE SURGERY         Social History     Tobacco Use    Smoking status: Former     Current packs/day: 0.00     Average packs/day: 0.3 packs/day for 10.0 years (2.5 ttl pk-yrs)     Types: Cigarettes     Start date: 1975     Quit date: 1985     Years since quittin.2    Smokeless tobacco: Never   Substance and Sexual Activity    Alcohol use: Yes     Alcohol/week: 4.0 standard drinks of alcohol     Types: 4 Glasses of wine per week     Comment: socially, glass of wine daily    Drug use: Never    Sexual activity: Not Currently     Partners: Male       Medications  She has a current medication list which includes the following prescription(s): fluticasone propionate, minoxidil, rosuvastatin, spironolactone-hydrochlorothiazide 25-25mg, and vitamin d.      Allergies  Review of patient's allergies indicates:   Allergen Reactions    No known drug allergies        All medications, allergies, and past history have been reviewed.    Objective:      Vitals:      2024     1:44 PM 3/7/2024     8:14 AM 3/11/2024     1:50 PM   Vitals - 1 value per visit   SYSTOLIC 135 108    DIASTOLIC 76 55    Pulse  74    SPO2  100 %    Weight (lb) 150 150.13    Weight (kg) 68.04 68.1    Height 5' 5" (1.651 m) " "5' 4.96" (1.65 m)    BMI (Calculated) 25 25    Pain Score Zero Zero Zero       There is no height or weight on file to calculate BSA.    Physical Exam:    GENERAL  APPEARANCE -  alert, appears stated age, and cooperative  BARRIER(S) TO COMMUNICATION -  none VOICE - appropriate for age and gender    INTEGUMENTARY  no suspicious head and neck lesions    HEENT  HEAD: Normocephalic, without obvious abnormality, atraumatic  FACE: INSPECTION - Symmetric, no signs of trauma, no suspicious lesion(s)      STRENGTH - facial symmetry intact     EYES  Normal occular alignment and mobility with no visible nystagmus at rest    EARS/NOSE/MOUTH/THROAT  EARS  PINNAE AND EXTERNAL EARS - no suspicious lesion OTOSCOPIC EXAM (surgical microscopy was not used for visualization/instrumentation): EAR EXAM - Normal ear canals, tympanic membranes and mobility, and middle ear spaces bilaterally.  HEARING - grossly intact to voice/finger rub    CHEST AND LUNG   INSPECTION & AUSCULTATION - normal effort, no stridor    NEUROLOGIC  MENTAL STATUS - alert, interactive CRANIAL NERVES - normal      Procedure(s):  None          Assessment:      Problem List Items Addressed This Visit    None  Visit Diagnoses       Moderate sensorineural hearing loss                     Plan:      Cleared for hearing aids.  No contraindication to amplification.  Discussed at length.  To consider hearing aid consultation with [facility] or any hearing provider preferred by her insurance.  Routine annual audiologic testing recommended.  Patient maybe a good candidate for over-the-counter hearing aids.  This was discussed briefly but should be further discussed with a qualified hearing aid professional.    Follow up as needed          Voice recognition software was used in the creation of this note/communication and any sound-alike errors which may have occurred from its use should be taken in context when interpreting.  If such errors prevent a clear understanding of the " note/communication, please contact the office for clarification.

## 2024-03-22 ENCOUNTER — HOSPITAL ENCOUNTER (OUTPATIENT)
Dept: RADIOLOGY | Facility: HOSPITAL | Age: 69
Discharge: HOME | End: 2024-03-22
Attending: INTERNAL MEDICINE
Payer: MEDICARE

## 2024-03-22 ENCOUNTER — CLINICAL SUPPORT (OUTPATIENT)
Dept: CARDIOLOGY | Facility: HOSPITAL | Age: 69
End: 2024-03-22
Attending: INTERNAL MEDICINE
Payer: MEDICARE

## 2024-03-22 ENCOUNTER — TELEPHONE (OUTPATIENT)
Dept: CARDIOLOGY | Facility: CLINIC | Age: 69
End: 2024-03-22
Payer: MEDICARE

## 2024-03-22 DIAGNOSIS — Z13.6 ENCOUNTER FOR SCREENING FOR CARDIOVASCULAR DISORDERS: ICD-10-CM

## 2024-03-22 DIAGNOSIS — E78.2 MIXED HYPERLIPIDEMIA: ICD-10-CM

## 2024-03-22 DIAGNOSIS — R00.2 PALPITATIONS: ICD-10-CM

## 2024-03-22 PROCEDURE — 75571 CT HRT W/O DYE W/CA TEST: CPT | Mod: TC

## 2024-03-22 PROCEDURE — 75571 CT HRT W/O DYE W/CA TEST: CPT | Mod: 26,,, | Performed by: STUDENT IN AN ORGANIZED HEALTH CARE EDUCATION/TRAINING PROGRAM

## 2024-03-22 PROCEDURE — 93271 ECG/MONITORING AND ANALYSIS: CPT

## 2024-03-22 PROCEDURE — 93272 ECG/REVIEW INTERPRET ONLY: CPT | Mod: ,,, | Performed by: INTERNAL MEDICINE

## 2024-03-22 NOTE — TELEPHONE ENCOUNTER
----- Message from Barbara Esquivel MD sent at 3/22/2024 12:44 PM CDT -----  Your calcium score is 0.risk of heart disease is very low. Small lung nodule in a non smoker likely does not need folllow up please also discuss with PCP. I dont think this is of concern

## 2024-03-25 NOTE — PROGRESS NOTES
Az hernandez,     We saw this patient on 03/07/2024 in clinic and the patient was referred for concerns regarding starting estradiol therapy. The calcium score remains 0 after 8 years (2016 --> 2024) , which is certainly reassuring. Would continue to recommend risk factor modification as per our clinic note.    Thank you for your consultation. Please call with questions or concerns.     Juan Manuel Woody MD  Cardiology PGY6  [facility]

## 2024-03-26 ENCOUNTER — PATIENT MESSAGE (OUTPATIENT)
Dept: OBSTETRICS AND GYNECOLOGY | Facility: CLINIC | Age: 69
End: 2024-03-26
Payer: MEDICARE

## 2024-03-26 ENCOUNTER — TELEPHONE (OUTPATIENT)
Dept: OBSTETRICS AND GYNECOLOGY | Facility: CLINIC | Age: 69
End: 2024-03-26
Payer: MEDICARE

## 2024-03-26 DIAGNOSIS — N95.1 MENOPAUSAL SYMPTOMS: Primary | ICD-10-CM

## 2024-03-26 NOTE — TELEPHONE ENCOUNTER
LM on mobile to follow up about HRT after seeing cardiology. Send message as well. Call back or send message.

## 2024-04-02 ENCOUNTER — TELEPHONE (OUTPATIENT)
Dept: OBSTETRICS AND GYNECOLOGY | Facility: CLINIC | Age: 69
End: 2024-04-02
Payer: MEDICARE

## 2024-04-02 RX ORDER — PROGESTERONE 100 MG/1
100 CAPSULE ORAL NIGHTLY
Qty: 30 CAPSULE | Refills: 11 | Status: SHIPPED | OUTPATIENT
Start: 2024-04-02 | End: 2025-04-02

## 2024-04-02 RX ORDER — ESTRADIOL 0.25 MG/.25G
GEL TOPICAL
Qty: 30 PACKET | Refills: 11 | Status: SHIPPED | OUTPATIENT
Start: 2024-04-02 | End: 2024-04-03

## 2024-04-03 RX ORDER — ESTRADIOL 0.03 MG/D
FILM, EXTENDED RELEASE TRANSDERMAL
Qty: 8 PATCH | Refills: 11 | Status: SHIPPED | OUTPATIENT
Start: 2024-04-03 | End: 2024-04-08

## 2024-04-04 DIAGNOSIS — N95.1 MENOPAUSAL SYMPTOMS: ICD-10-CM

## 2024-04-08 RX ORDER — ESTRADIOL 0.03 MG/D
FILM, EXTENDED RELEASE TRANSDERMAL
Qty: 8 PATCH | Refills: 11 | Status: SHIPPED | OUTPATIENT
Start: 2024-04-08

## 2024-04-08 NOTE — ANESTHESIA POSTPROCEDURE EVALUATION
Anesthesia Post Evaluation    Patient: Jyoti Tyler    Procedure(s) Performed: Procedure(s) (LRB):  EXTRACTION, CATARACT, WITH IOL INSERTION (Right)    Final Anesthesia Type: MAC    Patient location during evaluation: [facility]  Patient participation: Yes- Able to Participate  Level of consciousness: awake and alert  Post-procedure vital signs: reviewed and stable  Pain management: adequate  Airway patency: patent    PONV status at discharge: No PONV  Anesthetic complications: no      Cardiovascular status: blood pressure returned to baseline  Respiratory status: unassisted, room air and spontaneous ventilation  Hydration status: euvolemic  Follow-up not needed.          Vitals Value Taken Time   /60 09/03/20 0947   Temp 36.5 °C (97.7 °F) 09/03/20 0946   Pulse 68 09/03/20 0946   Resp 18 09/03/20 0946   SpO2 100 % 09/03/20 0946         No case tracking events are documented in the log.      Pain/Balbina Score: No data recorded       No

## 2024-04-10 ENCOUNTER — OFFICE VISIT (OUTPATIENT)
Dept: DERMATOLOGY | Facility: CLINIC | Age: 69
End: 2024-04-10
Payer: MEDICARE

## 2024-04-10 DIAGNOSIS — L81.4 LENTIGO: ICD-10-CM

## 2024-04-10 DIAGNOSIS — D22.9 MULTIPLE BENIGN NEVI: ICD-10-CM

## 2024-04-10 DIAGNOSIS — L57.0 ACTINIC KERATOSIS: ICD-10-CM

## 2024-04-10 DIAGNOSIS — Z12.83 SCREENING EXAM FOR SKIN CANCER: ICD-10-CM

## 2024-04-10 DIAGNOSIS — D18.01 CHERRY ANGIOMA: ICD-10-CM

## 2024-04-10 DIAGNOSIS — D48.5 NEOPLASM OF UNCERTAIN BEHAVIOR OF SKIN: Primary | ICD-10-CM

## 2024-04-10 DIAGNOSIS — D23.9 DERMATOFIBROMA: ICD-10-CM

## 2024-04-10 PROCEDURE — 1160F RVW MEDS BY RX/DR IN RCRD: CPT | Mod: CPTII,S$GLB,, | Performed by: STUDENT IN AN ORGANIZED HEALTH CARE EDUCATION/TRAINING PROGRAM

## 2024-04-10 PROCEDURE — 11102 TANGNTL BX SKIN SINGLE LES: CPT | Mod: S$GLB,,, | Performed by: STUDENT IN AN ORGANIZED HEALTH CARE EDUCATION/TRAINING PROGRAM

## 2024-04-10 PROCEDURE — 3288F FALL RISK ASSESSMENT DOCD: CPT | Mod: CPTII,S$GLB,, | Performed by: STUDENT IN AN ORGANIZED HEALTH CARE EDUCATION/TRAINING PROGRAM

## 2024-04-10 PROCEDURE — 88305 TISSUE EXAM BY PATHOLOGIST: CPT | Performed by: DERMATOLOGY

## 2024-04-10 PROCEDURE — 17000 DESTRUCT PREMALG LESION: CPT | Mod: XS,S$GLB,, | Performed by: STUDENT IN AN ORGANIZED HEALTH CARE EDUCATION/TRAINING PROGRAM

## 2024-04-10 PROCEDURE — 99213 OFFICE O/P EST LOW 20 MIN: CPT | Mod: 25,S$GLB,, | Performed by: STUDENT IN AN ORGANIZED HEALTH CARE EDUCATION/TRAINING PROGRAM

## 2024-04-10 PROCEDURE — 1101F PT FALLS ASSESS-DOCD LE1/YR: CPT | Mod: CPTII,S$GLB,, | Performed by: STUDENT IN AN ORGANIZED HEALTH CARE EDUCATION/TRAINING PROGRAM

## 2024-04-10 PROCEDURE — 1159F MED LIST DOCD IN RCRD: CPT | Mod: CPTII,S$GLB,, | Performed by: STUDENT IN AN ORGANIZED HEALTH CARE EDUCATION/TRAINING PROGRAM

## 2024-04-10 PROCEDURE — 88305 TISSUE EXAM BY PATHOLOGIST: CPT | Mod: 26,,, | Performed by: DERMATOLOGY

## 2024-04-10 PROCEDURE — 99999 PR PBB SHADOW E&M-EST. PATIENT-LVL III: CPT | Mod: PBBFAC,,, | Performed by: STUDENT IN AN ORGANIZED HEALTH CARE EDUCATION/TRAINING PROGRAM

## 2024-04-10 PROCEDURE — 1126F AMNT PAIN NOTED NONE PRSNT: CPT | Mod: CPTII,S$GLB,, | Performed by: STUDENT IN AN ORGANIZED HEALTH CARE EDUCATION/TRAINING PROGRAM

## 2024-04-10 NOTE — PATIENT INSTRUCTIONS
Shave Biopsy Wound Care    Your doctor has performed a shave biopsy today.  A band aid and vaseline ointment has been placed over the site.  This should remain in place for NO LONGER THAN 48 hours.  It is fine to remove the bandaid after 24 hours, if the area is no longer bleeding. It is recommended that you keep the area dry (do not wet)) for the first 24 hours.  After 24 hours, wash the area with warm soap and water and apply Vaseline jelly.  Many patients prefer to use Neosporin or Bacitracin ointment.  This is acceptable; however, know that you can develop an allergy to this medication even if you have used it safely for years.  It is important to keep the area moist.  Letting it dry out and get air slows healing time, and will worsen the scar.        If you notice increasing redness, tenderness, pain, or yellow drainage at the biopsy site, please notify your doctor.  These are signs of an infection.    If your biopsy site is bleeding, apply firm pressure for 15 minutes straight.  Repeat for another 15 minutes, if it is still bleeding.   If the surgical site continues to bleed, then please contact your doctor.      For [facility] users:   You will receive your biopsy results in [facility] as soon as they are available. Please be assured that your physician/provider will review your results and will then determine what further treatment, evaluation, or planning is required. You should be contacted by your physician's/provider's office within 5 business days of receiving your results; If not, please reach out to directly. This is one more way [facility] is putting you first.     [facility] [place] [address]/ [phone] FAX/ [email or web address]         CRYOSURGERY      Your doctor has used a method called cryosurgery to treat your skin condition. Cryosurgery refers to the use of very cold substances to treat a variety of skin conditions such as warts, pre-skin cancers, molluscum  contagiosum, sun spots, and several benign growths. The substance we use in cryosurgery is liquid nitrogen and is so cold (-195 degrees Celsius) that is burns when administered.     Following treatment in the office, the skin may immediately burn and become red. You may find the area around the lesion is affected as well. It is sometimes necessary to treat not only the lesion, but a small area of the surrounding normal skin to achieve a good response.     A blister, and even a blood filled blister, may form after treatment.   This is a normal response. If the blister is painful, it is acceptable to sterilize a needle and with rubbing alcohol and gently pop the blister. It is important that you gently wash the area with soap and warm water as the blister fluid may contain wart virus if a wart was treated. Do no remove the roof of the blister.     The area treated can take anywhere from 1-3 weeks to heal. Healing time depends on the kind skin lesion treated, the location, and how aggressively the lesion was treated. It is recommended that the areas treated are covered with Vaseline or bacitracin ointment and a band-aid. If a band-aid is not practical, just ointment applied several times per day will do. Keeping these areas moist will speed the healing time.    Treatment with liquid nitrogen can leave a scar. In dark skin, it may be a light or dark scar, in light skin it may be a white or pink scar. These will generally fade with time, but may never go away completely.     If you have any concerns after your treatment, please feel free to call the office.       [facility] [place] [address]/ [phone] FAX/ [email or web address]

## 2024-04-10 NOTE — PROGRESS NOTES
Subjective:      Patient ID:  Jyoti Tyler is a 68 y.o. female who presents for   Chief Complaint   Patient presents with    Skin Check     tbse     Patient presents for TBSE. No hx NMSC or MM.     C/o lesion to scalp, noticed by hairdresser recently. Denies any symptoms pertaining to the lesion.          Review of Systems   Constitutional:  Negative for fever, chills and fatigue.   Skin:  Positive for daily sunscreen use and wears hat. Negative for recent sunburn.   Hematologic/Lymphatic: Does not bruise/bleed easily.       Objective:   Physical Exam   Constitutional: She appears well-developed and well-nourished. No distress.   Neurological: She is alert and oriented to person, place, and time. She is not disoriented.   Psychiatric: She has a normal mood and affect.   Skin:   Areas Examined (abnormalities noted in diagram):   Scalp / Hair Palpated and Inspected  Head / Face Inspection Performed  Neck Inspection Performed  Chest / Axilla Inspection Performed  Abdomen Inspection Performed  Genitals / Buttocks / Groin Inspection Performed  Back Inspection Performed  RUE Inspected  LUE Inspection Performed  RLE Inspected  LLE Inspection Performed  Nails and Digits Inspection Performed                 Diagram Legend     Erythematous scaling macule/papule c/w actinic keratosis       Vascular papule c/w angioma      Pigmented verrucoid papule/plaque c/w seborrheic keratosis      Yellow umbilicated papule c/w sebaceous hyperplasia      Irregularly shaped tan macule c/w lentigo     1-2 mm smooth white papules consistent with Milia      Movable subcutaneous cyst with punctum c/w epidermal inclusion cyst      Subcutaneous movable cyst c/w pilar cyst      Firm pink to brown papule c/w dermatofibroma      Pedunculated fleshy papule(s) c/w skin tag(s)      Evenly pigmented macule c/w junctional nevus     Mildly variegated pigmented, slightly irregular-bordered macule c/w mildly atypical nevus      Flesh colored to evenly  pigmented papule c/w intradermal nevus       Pink pearly papule/plaque c/w basal cell carcinoma      Erythematous hyperkeratotic cursted plaque c/w SCC      Surgical scar with no sign of skin cancer recurrence      Open and closed comedones      Inflammatory papules and pustules      Verrucoid papule consistent consistent with wart     Erythematous eczematous patches and plaques     Dystrophic onycholytic nail with subungual debris c/w onychomycosis     Umbilicated papule    Erythematous-base heme-crusted tan verrucoid plaque consistent with inflamed seborrheic keratosis     Erythematous Silvery Scaling Plaque c/w Psoriasis     See annotation            Assessment / Plan:      Pathology Orders:       Normal Orders This Visit    Specimen to Pathology, Dermatology     Questions:    Procedure Type: Dermatology and skin neoplasms    Number of Specimens: 1    ------------------------: -------------------------    Spec 1 Procedure: Biopsy    Spec 1 Clinical Impression: SCCis, SK    Spec 1 Source: left upper abdomen    Release to patient: Immediate    Release to patient:           Neoplasm of uncertain behavior of skin  -     Specimen to Pathology, Dermatology    Shave biopsy procedure note:    Shave biopsy performed after verbal consent including risk of infection, scar, recurrence, need for additional treatment of site. Area prepped with alcohol, anesthetized with approximately 1.0cc of 1% lidocaine with epinephrine. Lesional tissue shaved with razor blade. Hemostasis achieved with application of aluminum chloride followed by hyfrecation. No complications. Dressing applied. Wound care explained.    Actinic keratosis  Cryosurgery Procedure Note    Verbal consent from the patient is obtained including, but not limited to, risk of hypopigmentation/hyperpigmentation, scar, recurrence of lesion. The patient is aware of the precancerous quality and need for treatment of these lesions. Liquid nitrogen cryosurgery is applied to  the 1 actinic keratoses, as detailed in the physical exam, to produce a freeze injury. The patient is aware that blisters may form and is instructed on wound care with gentle cleansing and use of vaseline ointment to keep moist until healed. The patient is supplied a handout on cryosurgery and is instructed to call if lesions do not completely resolve.    Dermatofibroma  Multiple benign nevi  Lentigo  Cherry angioma  Reassurance given to patient. No treatment is necessary.   Treatment of benign, asymptomatic lesions may be considered cosmetic.    Screening exam for skin cancer  Total body skin examination performed today including at least 12 points as noted in physical examination. Suspicious lesions noted.    Recommend daily sun protection/avoidance, use of at least SPF 30, broad spectrum sunscreen (OTC drug), skin self examinations, and routine physician surveillance to optimize early detection             Follow up in about 1 year (around 4/10/2025) for TBSE.

## 2024-04-15 LAB
FINAL PATHOLOGIC DIAGNOSIS: NORMAL
GROSS: NORMAL
Lab: NORMAL
MICROSCOPIC EXAM: NORMAL

## 2024-04-23 ENCOUNTER — PATIENT MESSAGE (OUTPATIENT)
Dept: DERMATOLOGY | Facility: CLINIC | Age: 69
End: 2024-04-23
Payer: MEDICARE

## 2024-04-23 DIAGNOSIS — D04.9 SQUAMOUS CELL CARCINOMA IN SITU (SCCIS) OF SKIN: Primary | ICD-10-CM

## 2024-04-23 RX ORDER — FLUOROURACIL 50 MG/G
CREAM TOPICAL 2 TIMES DAILY
Qty: 40 G | Refills: 0 | Status: SHIPPED | OUTPATIENT
Start: 2024-04-23

## 2024-04-23 NOTE — TELEPHONE ENCOUNTER
Biopsy with SCCis. Given large diameter, will try to avoid surgery. Will treat with efudex bid x 4 weeks then f/u 8 weeks to reeavl

## 2024-04-25 ENCOUNTER — TELEPHONE (OUTPATIENT)
Dept: DERMATOLOGY | Facility: CLINIC | Age: 69
End: 2024-04-25
Payer: MEDICARE

## 2024-04-30 NOTE — PROGRESS NOTES
Az hernandez,    Good news. The 30-day event monitor results are within normal limits. If the infrequent palpitations become more frequent or concerning, can consider re-ordering a 30-day event monitor vs referral to EP for possible implantable loop recorder.     Thank you for your consultation. Please call with questions or concerns.     Juan Manuel Woody MD  Cardiology PGY6  [facility]

## 2024-05-10 ENCOUNTER — OFFICE VISIT (OUTPATIENT)
Dept: PRIMARY CARE CLINIC | Facility: CLINIC | Age: 69
End: 2024-05-10
Payer: MEDICARE

## 2024-05-10 ENCOUNTER — LAB VISIT (OUTPATIENT)
Dept: LAB | Facility: HOSPITAL | Age: 69
End: 2024-05-10
Attending: STUDENT IN AN ORGANIZED HEALTH CARE EDUCATION/TRAINING PROGRAM
Payer: MEDICARE

## 2024-05-10 VITALS
BODY MASS INDEX: 26.42 KG/M2 | RESPIRATION RATE: 16 BRPM | WEIGHT: 154.75 LBS | DIASTOLIC BLOOD PRESSURE: 52 MMHG | HEIGHT: 64 IN | HEART RATE: 74 BPM | SYSTOLIC BLOOD PRESSURE: 120 MMHG | OXYGEN SATURATION: 96 %

## 2024-05-10 VITALS
RESPIRATION RATE: 16 BRPM | HEIGHT: 64 IN | OXYGEN SATURATION: 98 % | WEIGHT: 154.75 LBS | SYSTOLIC BLOOD PRESSURE: 120 MMHG | DIASTOLIC BLOOD PRESSURE: 52 MMHG | BODY MASS INDEX: 26.42 KG/M2 | HEART RATE: 74 BPM

## 2024-05-10 DIAGNOSIS — M81.0 AGE-RELATED OSTEOPOROSIS WITHOUT CURRENT PATHOLOGICAL FRACTURE: ICD-10-CM

## 2024-05-10 DIAGNOSIS — I10 ESSENTIAL HYPERTENSION: ICD-10-CM

## 2024-05-10 DIAGNOSIS — I77.9 MILD CAROTID ARTERY DISEASE: ICD-10-CM

## 2024-05-10 DIAGNOSIS — G25.0 BENIGN ESSENTIAL TREMOR: Primary | ICD-10-CM

## 2024-05-10 DIAGNOSIS — Z00.00 ENCOUNTER FOR PREVENTIVE HEALTH EXAMINATION: Primary | ICD-10-CM

## 2024-05-10 DIAGNOSIS — E78.2 MIXED HYPERLIPIDEMIA: ICD-10-CM

## 2024-05-10 DIAGNOSIS — Z78.0 MENOPAUSE: ICD-10-CM

## 2024-05-10 LAB
ANION GAP SERPL CALC-SCNC: 10 MMOL/L (ref 8–16)
BUN SERPL-MCNC: 14 MG/DL (ref 8–23)
CALCIUM SERPL-MCNC: 9.4 MG/DL (ref 8.7–10.5)
CHLORIDE SERPL-SCNC: 97 MMOL/L (ref 95–110)
CO2 SERPL-SCNC: 28 MMOL/L (ref 23–29)
CREAT SERPL-MCNC: 0.7 MG/DL (ref 0.5–1.4)
ERYTHROCYTE [DISTWIDTH] IN BLOOD BY AUTOMATED COUNT: 13.4 % (ref 11.5–14.5)
EST. GFR  (NO RACE VARIABLE): >60 ML/MIN/1.73 M^2
GLUCOSE SERPL-MCNC: 83 MG/DL (ref 70–110)
HCT VFR BLD AUTO: 41.8 % (ref 37–48.5)
HGB BLD-MCNC: 14.4 G/DL (ref 12–16)
MCH RBC QN AUTO: 29.9 PG (ref 27–31)
MCHC RBC AUTO-ENTMCNC: 34.4 G/DL (ref 32–36)
MCV RBC AUTO: 87 FL (ref 82–98)
PLATELET # BLD AUTO: 261 K/UL (ref 150–450)
PMV BLD AUTO: 9 FL (ref 9.2–12.9)
POTASSIUM SERPL-SCNC: 3.6 MMOL/L (ref 3.5–5.1)
RBC # BLD AUTO: 4.82 M/UL (ref 4–5.4)
SODIUM SERPL-SCNC: 135 MMOL/L (ref 136–145)
WBC # BLD AUTO: 8.64 K/UL (ref 3.9–12.7)

## 2024-05-10 PROCEDURE — 3008F BODY MASS INDEX DOCD: CPT | Mod: CPTII,S$GLB,, | Performed by: STUDENT IN AN ORGANIZED HEALTH CARE EDUCATION/TRAINING PROGRAM

## 2024-05-10 PROCEDURE — 1158F ADVNC CARE PLAN TLK DOCD: CPT | Mod: CPTII,S$GLB,, | Performed by: PHYSICIAN ASSISTANT

## 2024-05-10 PROCEDURE — 3078F DIAST BP <80 MM HG: CPT | Mod: CPTII,S$GLB,, | Performed by: STUDENT IN AN ORGANIZED HEALTH CARE EDUCATION/TRAINING PROGRAM

## 2024-05-10 PROCEDURE — 99999 PR PBB SHADOW E&M-EST. PATIENT-LVL III: CPT | Mod: PBBFAC,,, | Performed by: PHYSICIAN ASSISTANT

## 2024-05-10 PROCEDURE — 99999 PR PBB SHADOW E&M-EST. PATIENT-LVL III: CPT | Mod: PBBFAC,,, | Performed by: STUDENT IN AN ORGANIZED HEALTH CARE EDUCATION/TRAINING PROGRAM

## 2024-05-10 PROCEDURE — 1160F RVW MEDS BY RX/DR IN RCRD: CPT | Mod: CPTII,S$GLB,, | Performed by: PHYSICIAN ASSISTANT

## 2024-05-10 PROCEDURE — 1158F ADVNC CARE PLAN TLK DOCD: CPT | Mod: CPTII,S$GLB,, | Performed by: STUDENT IN AN ORGANIZED HEALTH CARE EDUCATION/TRAINING PROGRAM

## 2024-05-10 PROCEDURE — 1126F AMNT PAIN NOTED NONE PRSNT: CPT | Mod: CPTII,S$GLB,, | Performed by: STUDENT IN AN ORGANIZED HEALTH CARE EDUCATION/TRAINING PROGRAM

## 2024-05-10 PROCEDURE — 1101F PT FALLS ASSESS-DOCD LE1/YR: CPT | Mod: CPTII,S$GLB,, | Performed by: STUDENT IN AN ORGANIZED HEALTH CARE EDUCATION/TRAINING PROGRAM

## 2024-05-10 PROCEDURE — 1159F MED LIST DOCD IN RCRD: CPT | Mod: CPTII,S$GLB,, | Performed by: STUDENT IN AN ORGANIZED HEALTH CARE EDUCATION/TRAINING PROGRAM

## 2024-05-10 PROCEDURE — 99214 OFFICE O/P EST MOD 30 MIN: CPT | Mod: S$GLB,,, | Performed by: STUDENT IN AN ORGANIZED HEALTH CARE EDUCATION/TRAINING PROGRAM

## 2024-05-10 PROCEDURE — 3288F FALL RISK ASSESSMENT DOCD: CPT | Mod: CPTII,S$GLB,, | Performed by: STUDENT IN AN ORGANIZED HEALTH CARE EDUCATION/TRAINING PROGRAM

## 2024-05-10 PROCEDURE — 1170F FXNL STATUS ASSESSED: CPT | Mod: CPTII,S$GLB,, | Performed by: PHYSICIAN ASSISTANT

## 2024-05-10 PROCEDURE — 1126F AMNT PAIN NOTED NONE PRSNT: CPT | Mod: CPTII,S$GLB,, | Performed by: PHYSICIAN ASSISTANT

## 2024-05-10 PROCEDURE — 1159F MED LIST DOCD IN RCRD: CPT | Mod: CPTII,S$GLB,, | Performed by: PHYSICIAN ASSISTANT

## 2024-05-10 PROCEDURE — 85027 COMPLETE CBC AUTOMATED: CPT | Performed by: STUDENT IN AN ORGANIZED HEALTH CARE EDUCATION/TRAINING PROGRAM

## 2024-05-10 PROCEDURE — 80048 BASIC METABOLIC PNL TOTAL CA: CPT | Performed by: STUDENT IN AN ORGANIZED HEALTH CARE EDUCATION/TRAINING PROGRAM

## 2024-05-10 PROCEDURE — 3074F SYST BP LT 130 MM HG: CPT | Mod: CPTII,S$GLB,, | Performed by: STUDENT IN AN ORGANIZED HEALTH CARE EDUCATION/TRAINING PROGRAM

## 2024-05-10 PROCEDURE — 3074F SYST BP LT 130 MM HG: CPT | Mod: CPTII,S$GLB,, | Performed by: PHYSICIAN ASSISTANT

## 2024-05-10 PROCEDURE — 36415 COLL VENOUS BLD VENIPUNCTURE: CPT | Performed by: STUDENT IN AN ORGANIZED HEALTH CARE EDUCATION/TRAINING PROGRAM

## 2024-05-10 PROCEDURE — 3078F DIAST BP <80 MM HG: CPT | Mod: CPTII,S$GLB,, | Performed by: PHYSICIAN ASSISTANT

## 2024-05-10 PROCEDURE — G0439 PPPS, SUBSEQ VISIT: HCPCS | Mod: S$GLB,,, | Performed by: PHYSICIAN ASSISTANT

## 2024-05-10 RX ORDER — PROPRANOLOL HYDROCHLORIDE 20 MG/1
20 TABLET ORAL 2 TIMES DAILY PRN
Qty: 60 TABLET | Refills: 5 | Status: SHIPPED | OUTPATIENT
Start: 2024-05-10

## 2024-05-10 NOTE — ASSESSMENT & PLAN NOTE
She has an action tremor. Suspect essential tremor. Only happens intermittently, twice a week. Will try propranolol to take as needed

## 2024-05-10 NOTE — PROGRESS NOTES
"  Jyoti Tyler presented for a follow-up Medicare AWV today. The following components were reviewed and updated:    Medical history  Family History  Social history  Allergies and Current Medications  Health Risk Assessment  Health Maintenance  Care Team    **See Completed Assessments for Annual Wellness visit with in the encounter summary    The following assessments were completed:  Depression Screening  Cognitive function Screening  Timed Get Up Test  Whisper Test      Opioid documentation:      Patient does not have a current opioid prescription.          Vitals:    05/10/24 0909   BP: (!) 120/52   BP Location: Left arm   Patient Position: Sitting   BP Method: Small (Manual)   Pulse: 74   Resp: 16   SpO2: 98%   Weight: 70.2 kg (154 lb 12.2 oz)   Height: 5' 4" (1.626 m)     Body mass index is 26.57 kg/m².       Physical Exam  Constitutional:       Appearance: Normal appearance.   HENT:      Head: Normocephalic and atraumatic.   Skin:     General: Skin is warm and dry.   Neurological:      General: No focal deficit present.      Mental Status: She is alert and oriented to person, place, and time.   Psychiatric:         Mood and Affect: Mood normal.           Diagnoses and health risks identified today and associated recommendations/orders:    1. Encounter for preventive health examination  Provided Jyoti with a 5-10 year written screening schedule and personal prevention plan. Recommendations were developed using the USPSTF age appropriate recommendations. Education, counseling, and referrals were provided as needed.  After Visit Summary printed and given to patient which includes a list of additional screenings\tests needed.    2. Mild carotid artery disease  Overview:  20-39% stenosis bilaterally, seen on ultrasound from 04/05/16    Assessment & Plan:  Continue statin      3. Mixed hyperlipidemia  Assessment & Plan:  Stable, continue statin      4. Essential hypertension  Overview:  2012    Assessment & " Plan:  Stable, continue spironolactone-hctz      ACP- given today  HM- will schedule mammo    No follow-ups on file.      Amy Lado, PA-C [facility]

## 2024-05-10 NOTE — PROGRESS NOTES
Clinic Note  5/10/2024      Subjective:       Patient ID:  Jyoti is a 68 y.o. female being seen for a f/u visit    Chief Complaint: Follow-up    Follow-up  Pertinent negatives include no abdominal pain, chest pain, chills, congestion, coughing, fever or headaches.     Jyoti Tyler is a 68 y.o.  female who presents with allergies, carotid artery disease (20-39%), HTN, osteoporosis, here for a f/u visit  -she started taking hormonal therapy. She had to see cardiology and she was started on statin which she is taking daily. No issues with that. Sleeping much better since starting hormonal therapy.   -still has the tremor. Thinks it might have worsen In her right hand. It happened when she was eating dinner. When she isn't using her hands, its fine. Can try propranolol   -gets anxious on road trips. Would like to try benzo. Will see if propranolol helps, if not, asked her to message me.   -due for mammo since dec. She will schedule it.   -no falls in the last year  -no hospitalizations     Advance Care Planning     Date: 05/10/2024    Power of   I initiated the process of voluntary advance care planning today and explained the importance of this process to the patient.  I introduced the concept of advance directives to the patient, as well. Then the patient received detailed information about the importance of designating a Health Care Power of  (HCPOA). She was also instructed to communicate with this person about their wishes for future healthcare, should she become sick and lose decision-making capacity. The patient has not previously appointed a HCPOA. After our discussion, the patient has decided to complete a HCPOA and has appointed her son, health care agent: kandace Tyler & health care agent number: [phone]. I spent a total time of 5 minutes discussing this issue with the patient.    -will give her a HCPOA to fill out today.        Review of Systems   Constitutional:  Negative for  "chills, fever and weight loss.   HENT:  Negative for congestion.    Respiratory:  Negative for cough and shortness of breath.    Cardiovascular:  Negative for chest pain.   Gastrointestinal:  Negative for abdominal pain, blood in stool, constipation and diarrhea.   Genitourinary:  Negative for dysuria and hematuria.   Neurological:  Negative for dizziness and headaches.       Medication List with Changes/Refills   New Medications    PROPRANOLOL (INDERAL) 20 MG TABLET    Take 1 tablet (20 mg total) by mouth 2 (two) times daily as needed (tremor).   Current Medications    ESTRADIOL (VIVELLE-DOT) 0.025 MG/24 HR    APPLY PATCH TWICE A WEEK (MONDAY AND THURSDAY)    FLUOROURACIL (EFUDEX) 5 % CREAM    Apply topically 2 (two) times daily. Apply to skin cancer on abdomen for 4 weeks    FLUTICASONE PROPIONATE (FLONASE) 50 MCG/ACTUATION NASAL SPRAY    SPRAY 2 SPRAYS BY EACH NOSTRIL ROUTE ONCE DAILY.    MINOXIDIL (LONITEN) 2.5 MG TABLET    Take 1 tablet (2.5 mg total) by mouth once daily.    PROGESTERONE (PROMETRIUM) 100 MG CAPSULE    Take 1 capsule (100 mg total) by mouth every evening.    ROSUVASTATIN (CRESTOR) 5 MG TABLET    Take 1 tablet (5 mg total) by mouth once daily.    SPIRONOLACTONE-HYDROCHLOROTHIAZIDE 25-25MG (ALDACTAZIDE) 25-25 MG TAB    Take 1 tablet by mouth once daily.    VITAMIN D (VITAMIN D3) 1000 UNITS TAB    Take 1,000 Units by mouth once daily.           Objective:      BP (!) 120/52 (BP Location: Left arm, Patient Position: Sitting, BP Method: Small (Manual))   Pulse 74   Resp 16   Ht 5' 4" (1.626 m)   Wt 70.2 kg (154 lb 12.2 oz)   LMP 01/01/2000 (Approximate)   SpO2 96%   BMI 26.57 kg/m²   Estimated body mass index is 26.57 kg/m² as calculated from the following:    Height as of this encounter: 5' 4" (1.626 m).    Weight as of this encounter: 70.2 kg (154 lb 12.2 oz).  Physical Exam  Constitutional:       Appearance: Normal appearance.   HENT:      Right Ear: Tympanic membrane normal.      Left Ear: " Tympanic membrane normal.      Mouth/Throat:      Mouth: Mucous membranes are moist.      Pharynx: Oropharynx is clear. No oropharyngeal exudate or posterior oropharyngeal erythema.   Eyes:      Conjunctiva/sclera: Conjunctivae normal.   Cardiovascular:      Rate and Rhythm: Normal rate and regular rhythm.      Pulses: Normal pulses.      Heart sounds: Normal heart sounds.   Pulmonary:      Effort: Pulmonary effort is normal. No respiratory distress.      Breath sounds: Normal breath sounds.   Abdominal:      General: Bowel sounds are normal.   Musculoskeletal:      Right lower leg: No edema.      Left lower leg: No edema.   Skin:     General: Skin is warm.   Neurological:      Mental Status: She is alert and oriented to person, place, and time.   Psychiatric:         Mood and Affect: Mood normal.         Behavior: Behavior normal.           Assessment and Plan:     1. Benign essential tremor  Assessment & Plan:  She has an action tremor. Suspect essential tremor. Only happens intermittently, twice a week. Will try propranolol to take as needed      2. Essential hypertension  Overview:  2012    Assessment & Plan:  BP well controlled on current regimen. She is taking spironolactone-hctz daily. Continue. Ordered labs.     Orders:  -     Basic Metabolic Panel; Future; Expected date: 05/10/2024  -     CBC Without Differential; Future; Expected date: 05/10/2024    3. Mixed hyperlipidemia  Assessment & Plan:  Was recently started on rosuvastatin 5 mg daily. No issues. Lipid panel ordered by cardiology.       4. Menopause  Assessment & Plan:  Patient is on hormonal replacement therapy with estradiol and progesterone. Continue.      5. Age-related osteoporosis without current pathological fracture  Assessment & Plan:  She does not want to take medications. She is doing weight bearing exercises twice a week. 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 She is taking vitamin d 2000 units daily and eating  diet rich in calcium      Other orders  -     propranoloL (INDERAL) 20 MG tablet; Take 1 tablet (20 mg total) by mouth 2 (two) times daily as needed (tremor).  Dispense: 60 tablet; Refill: 5         Follow Up:   Follow up in about 6 months (around 11/10/2024).    Other Orders Placed This Visit:  Orders Placed This Encounter   Procedures    Basic Metabolic Panel    CBC Without Differential         Kt Lux I spent a total of 30 minutes on the day of the visit.  This includes face to face time and non-face to face time preparing to see the patient (eg, review of tests), obtaining and/or reviewing separately obtained history, documenting clinical information in the electronic or other health record, independently interpreting results and communicating results to the patient/family/caregiver, or care coordinator.

## 2024-05-10 NOTE — PATIENT INSTRUCTIONS
Counseling and Referral of Other Preventative  (Italic type indicates deductible and co-insurance are waived)    Patient Name: Jyoti Tyler  Today's Date: 5/10/2024    Health Maintenance       Date Due Completion Date    Mammogram 12/05/2023 12/5/2022    COVID-19 Vaccine (7 - 2023-24 season) 04/06/2024 12/6/2023    TETANUS VACCINE 05/17/2024 (Originally 6/8/1973) ---    DEXA Scan 01/05/2026 1/5/2023    Lipid Panel 11/21/2028 11/21/2023    Colorectal Cancer Screening 03/08/2029 3/8/2022        No orders of the defined types were placed in this encounter.    The following information is provided to all patients.  This information is to help you find resources for any of the problems found today that may be affecting your health:                  Living healthy guide: www.[facility].[place].gov      Understanding Diabetes: www.diabetes.org      Eating healthy: www.cdc.gov/healthyweight      [facility] home safety checklist: www.cdc.gov/steadi/patient.html      Agency on Aging: www.goea.[place].[facility]      Alcoholics anonymous (AA): www.aa.org      Physical Activity: www.reena.nih.gov/bz2wuig      Tobacco use: www.quitwithusla.org

## 2024-05-10 NOTE — ASSESSMENT & PLAN NOTE
She does not want to take medications. She is doing weight bearing exercises twice a week. 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 She is taking vitamin d 2000 units daily and eating diet rich in calcium

## 2024-05-15 ENCOUNTER — TELEPHONE (OUTPATIENT)
Dept: PRIMARY CARE CLINIC | Facility: CLINIC | Age: 69
End: 2024-05-15
Payer: MEDICARE

## 2024-05-15 NOTE — TELEPHONE ENCOUNTER
----- Message from Kt Lux MD sent at 5/15/2024  3:22 PM CDT -----  Pls let patient know that her labs look good.

## 2024-05-20 ENCOUNTER — PATIENT MESSAGE (OUTPATIENT)
Dept: DERMATOLOGY | Facility: CLINIC | Age: 69
End: 2024-05-20
Payer: MEDICARE

## 2024-05-24 ENCOUNTER — HOSPITAL ENCOUNTER (OUTPATIENT)
Dept: RADIOLOGY | Facility: HOSPITAL | Age: 69
Discharge: HOME | End: 2024-05-24
Attending: STUDENT IN AN ORGANIZED HEALTH CARE EDUCATION/TRAINING PROGRAM
Payer: MEDICARE

## 2024-05-24 DIAGNOSIS — Z12.31 SCREENING MAMMOGRAM FOR BREAST CANCER: ICD-10-CM

## 2024-05-24 PROCEDURE — 77063 BREAST TOMOSYNTHESIS BI: CPT | Mod: 26,,, | Performed by: RADIOLOGY

## 2024-05-24 PROCEDURE — 77063 BREAST TOMOSYNTHESIS BI: CPT | Mod: TC

## 2024-05-24 PROCEDURE — 77067 SCR MAMMO BI INCL CAD: CPT | Mod: 26,,, | Performed by: RADIOLOGY

## 2024-06-03 ENCOUNTER — TELEPHONE (OUTPATIENT)
Dept: PRIMARY CARE CLINIC | Facility: CLINIC | Age: 69
End: 2024-06-03
Payer: MEDICARE

## 2024-06-03 NOTE — TELEPHONE ENCOUNTER
----- Message from Kt Lux MD sent at 6/3/2024 11:34 AM CDT -----  Pls let patient know that her mammogram was neg. Will repeat in one yr

## 2024-06-26 ENCOUNTER — OFFICE VISIT (OUTPATIENT)
Dept: DERMATOLOGY | Facility: CLINIC | Age: 69
End: 2024-06-26
Payer: MEDICARE

## 2024-06-26 DIAGNOSIS — Z85.828 HISTORY OF SKIN CANCER: ICD-10-CM

## 2024-06-26 DIAGNOSIS — D04.9 SQUAMOUS CELL CARCINOMA IN SITU (SCCIS) OF SKIN: Primary | ICD-10-CM

## 2024-06-26 PROCEDURE — 1160F RVW MEDS BY RX/DR IN RCRD: CPT | Mod: CPTII,S$GLB,, | Performed by: STUDENT IN AN ORGANIZED HEALTH CARE EDUCATION/TRAINING PROGRAM

## 2024-06-26 PROCEDURE — 3288F FALL RISK ASSESSMENT DOCD: CPT | Mod: CPTII,S$GLB,, | Performed by: STUDENT IN AN ORGANIZED HEALTH CARE EDUCATION/TRAINING PROGRAM

## 2024-06-26 PROCEDURE — 1157F ADVNC CARE PLAN IN RCRD: CPT | Mod: CPTII,S$GLB,, | Performed by: STUDENT IN AN ORGANIZED HEALTH CARE EDUCATION/TRAINING PROGRAM

## 2024-06-26 PROCEDURE — 1126F AMNT PAIN NOTED NONE PRSNT: CPT | Mod: CPTII,S$GLB,, | Performed by: STUDENT IN AN ORGANIZED HEALTH CARE EDUCATION/TRAINING PROGRAM

## 2024-06-26 PROCEDURE — 1159F MED LIST DOCD IN RCRD: CPT | Mod: CPTII,S$GLB,, | Performed by: STUDENT IN AN ORGANIZED HEALTH CARE EDUCATION/TRAINING PROGRAM

## 2024-06-26 PROCEDURE — 99213 OFFICE O/P EST LOW 20 MIN: CPT | Mod: 25,S$GLB,, | Performed by: STUDENT IN AN ORGANIZED HEALTH CARE EDUCATION/TRAINING PROGRAM

## 2024-06-26 PROCEDURE — 17261 DSTRJ MAL LES T/A/L .6-1.0CM: CPT | Mod: S$GLB,,, | Performed by: STUDENT IN AN ORGANIZED HEALTH CARE EDUCATION/TRAINING PROGRAM

## 2024-06-26 PROCEDURE — 1101F PT FALLS ASSESS-DOCD LE1/YR: CPT | Mod: CPTII,S$GLB,, | Performed by: STUDENT IN AN ORGANIZED HEALTH CARE EDUCATION/TRAINING PROGRAM

## 2024-06-26 PROCEDURE — 99999 PR PBB SHADOW E&M-EST. PATIENT-LVL III: CPT | Mod: PBBFAC,,, | Performed by: STUDENT IN AN ORGANIZED HEALTH CARE EDUCATION/TRAINING PROGRAM

## 2024-06-26 NOTE — PROGRESS NOTES
Subjective:      Patient ID:  Jyoti Tyler is a 69 y.o. female who presents for   Chief Complaint   Patient presents with    Follow-up     Pt here for Efudex f/u     Pt last seen on 4/10/2024 for bx of L upper abdomen- SCC in situ     Pt treated spot with Efudex for 30 days. Finished about 1 month ago    Follow-up      Review of Systems   Constitutional:  Negative for fever, chills and fatigue.   Skin:  Positive for daily sunscreen use and wears hat. Negative for recent sunburn.   Hematologic/Lymphatic: Does not bruise/bleed easily.       Objective:   Physical Exam   Constitutional: She appears well-developed and well-nourished.   Neurological: She is alert and oriented to person, place, and time.   Psychiatric: She has a normal mood and affect.   Skin:   Areas Examined (abnormalities noted in diagram):   Abdomen Inspection Performed            Diagram Legend     Erythematous scaling macule/papule c/w actinic keratosis       Vascular papule c/w angioma      Pigmented verrucoid papule/plaque c/w seborrheic keratosis      Yellow umbilicated papule c/w sebaceous hyperplasia      Irregularly shaped tan macule c/w lentigo     1-2 mm smooth white papules consistent with Milia      Movable subcutaneous cyst with punctum c/w epidermal inclusion cyst      Subcutaneous movable cyst c/w pilar cyst      Firm pink to brown papule c/w dermatofibroma      Pedunculated fleshy papule(s) c/w skin tag(s)      Evenly pigmented macule c/w junctional nevus     Mildly variegated pigmented, slightly irregular-bordered macule c/w mildly atypical nevus      Flesh colored to evenly pigmented papule c/w intradermal nevus       Pink pearly papule/plaque c/w basal cell carcinoma      Erythematous hyperkeratotic cursted plaque c/w SCC      Surgical scar with no sign of skin cancer recurrence      Open and closed comedones      Inflammatory papules and pustules      Verrucoid papule consistent consistent with wart     Erythematous  eczematous patches and plaques     Dystrophic onycholytic nail with subungual debris c/w onychomycosis     Umbilicated papule    Erythematous-base heme-crusted tan verrucoid plaque consistent with inflamed seborrheic keratosis     Erythematous Silvery Scaling Plaque c/w Psoriasis     See annotation      Assessment / Plan:        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 About 80% resolved but still with some residual around the periphery. Residual areas treated with LN2 today. Once healed, repeat efudex bid x 4-6 weeks and counseled to ensure she is treating with about 1 cm margin of normal skin to ensure periphery is being adaquetely treated    Cryosurgery procedure note:  Verbal consent from the patient is obtained including, but not limited to, risk of hypopigmentation/hyperpigmentation, scar, recurrence of lesion. Liquid nitrogen cryosurgery is applied to 1 lesions to produce a freeze injury. The patient is aware that blisters may form and is instructed on wound care with gentle cleansing and use of vaseline ointment to keep moist until healed. The patient is supplied a handout on cryosurgery and is instructed to call if lesions do not completely resolve.  Area treated 1cm x 4mm    F/u 3 months           Follow up in about 3 months (around 9/26/2024) for UBSE and SCCis f/u.

## 2024-06-26 NOTE — PATIENT INSTRUCTIONS

## 2024-07-03 ENCOUNTER — OFFICE VISIT (OUTPATIENT)
Dept: OBSTETRICS AND GYNECOLOGY | Facility: CLINIC | Age: 69
End: 2024-07-03
Payer: MEDICARE

## 2024-07-03 VITALS
DIASTOLIC BLOOD PRESSURE: 55 MMHG | HEART RATE: 76 BPM | WEIGHT: 155.19 LBS | BODY MASS INDEX: 26.49 KG/M2 | SYSTOLIC BLOOD PRESSURE: 108 MMHG | HEIGHT: 64 IN

## 2024-07-03 DIAGNOSIS — N95.1 MENOPAUSAL SYMPTOMS: ICD-10-CM

## 2024-07-03 PROCEDURE — 1160F RVW MEDS BY RX/DR IN RCRD: CPT | Mod: CPTII,S$GLB,, | Performed by: PHYSICIAN ASSISTANT

## 2024-07-03 PROCEDURE — 1126F AMNT PAIN NOTED NONE PRSNT: CPT | Mod: CPTII,S$GLB,, | Performed by: PHYSICIAN ASSISTANT

## 2024-07-03 PROCEDURE — 3008F BODY MASS INDEX DOCD: CPT | Mod: CPTII,S$GLB,, | Performed by: PHYSICIAN ASSISTANT

## 2024-07-03 PROCEDURE — 99999 PR PBB SHADOW E&M-EST. PATIENT-LVL III: CPT | Mod: PBBFAC,,, | Performed by: PHYSICIAN ASSISTANT

## 2024-07-03 PROCEDURE — 1159F MED LIST DOCD IN RCRD: CPT | Mod: CPTII,S$GLB,, | Performed by: PHYSICIAN ASSISTANT

## 2024-07-03 PROCEDURE — 1157F ADVNC CARE PLAN IN RCRD: CPT | Mod: CPTII,S$GLB,, | Performed by: PHYSICIAN ASSISTANT

## 2024-07-03 PROCEDURE — 3074F SYST BP LT 130 MM HG: CPT | Mod: CPTII,S$GLB,, | Performed by: PHYSICIAN ASSISTANT

## 2024-07-03 PROCEDURE — 99213 OFFICE O/P EST LOW 20 MIN: CPT | Mod: S$GLB,,, | Performed by: PHYSICIAN ASSISTANT

## 2024-07-03 PROCEDURE — 3078F DIAST BP <80 MM HG: CPT | Mod: CPTII,S$GLB,, | Performed by: PHYSICIAN ASSISTANT

## 2024-07-03 PROCEDURE — 3288F FALL RISK ASSESSMENT DOCD: CPT | Mod: CPTII,S$GLB,, | Performed by: PHYSICIAN ASSISTANT

## 2024-07-03 PROCEDURE — 1101F PT FALLS ASSESS-DOCD LE1/YR: CPT | Mod: CPTII,S$GLB,, | Performed by: PHYSICIAN ASSISTANT

## 2024-07-03 RX ORDER — PROGESTERONE 100 MG/1
100 CAPSULE ORAL NIGHTLY
Qty: 90 CAPSULE | Refills: 3 | Status: SHIPPED | OUTPATIENT
Start: 2024-07-03 | End: 2025-07-03

## 2024-07-03 RX ORDER — ESTRADIOL 0.03 MG/D
FILM, EXTENDED RELEASE TRANSDERMAL
Qty: 24 PATCH | Refills: 3 | Status: SHIPPED | OUTPATIENT
Start: 2024-07-03

## 2024-07-03 NOTE — PROGRESS NOTES
Subjective:      Jyoti Tyler is a 69 y.o. female who presents for follow-up of hormone replacement therapy.  At her last visit on 2/2/2024, she reported that she was interested in restarting HRT for multiple health benefits, including osteoporosis. Still having hot flashes and insomnia.   Saw cardiology on 3/7/2024 and repeat CT cardiac score on 3/22/2024 remains 0. She was cleared to restart HRT. Recommended divigel 0.25mg daily but not covered by insurance so started vivelle dot 0.025mg BIW and progesterone 100mg QHS.    PLAN:  Vivelle dot 0.025mg BIW  progesterone 100mg QHS     The patient reports that she is feeling great. Hot flashes have improved and sleeping much better. Best she has felt in the last 10 years. Denies vaginal bleeding. She wishes to continue.     PCP: Kt Lux MD    Routine labs: 11/21/2023  WWE: 12/30/2022 with Dr. Theodore  Pap smear: 1/14/2014 negative  Mammogram: 5/24/2024 TC 3.36%  DEXA: 1/5/2023 osteoporosis left hip- declines tx that this time  Colonoscopy: 3/8/2022 repeat in 7 years       Lab Visit on 05/10/2024   Component Date Value Ref Range Status    Sodium 05/10/2024 135 (L)  136 - 145 mmol/L Final    Potassium 05/10/2024 3.6  3.5 - 5.1 mmol/L Final    Chloride 05/10/2024 97  95 - 110 mmol/L Final    CO2 05/10/2024 28  23 - 29 mmol/L Final    Glucose 05/10/2024 83  70 - 110 mg/dL Final    BUN 05/10/2024 14  8 - 23 mg/dL Final    Creatinine 05/10/2024 0.7  0.5 - 1.4 mg/dL Final    Calcium 05/10/2024 9.4  8.7 - 10.5 mg/dL Final    Anion Gap 05/10/2024 10  8 - 16 mmol/L Final    eGFR 05/10/2024 >60.0  >60 mL/min/1.73 m^2 Final    WBC 05/10/2024 8.64  3.90 - 12.70 K/uL Final    RBC 05/10/2024 4.82  4.00 - 5.40 M/uL Final    Hemoglobin 05/10/2024 14.4  12.0 - 16.0 g/dL Final    Hematocrit 05/10/2024 41.8  37.0 - 48.5 % Final    MCV 05/10/2024 87  82 - 98 fL Final    MCH 05/10/2024 29.9  27.0 - 31.0 pg Final    MCHC 05/10/2024 34.4  32.0 - 36.0 g/dL Final    RDW 05/10/2024  13.4  11.5 - 14.5 % Final    Platelets 05/10/2024 261  150 - 450 K/uL Final    MPV 05/10/2024 9.0 (L)  9.2 - 12.9 fL Final   Office Visit on 04/10/2024   Component Date Value Ref Range Status    Final Pathologic Diagnosis 04/10/2024    Final                    Value:1. Skin,  Left upper abdomen,  shave biopsy:   - SQUAMOUS CELL CARCINOMA IN SITU   - The  tumor involves the peripheral tissue edge.  The deep tissue edge is uninvolved.    This lesion is skin cancer. You will be contacted regarding treatment.        Gross 04/10/2024    Final                    Value:Patient MRN:  [id number]   Pathology ID:  [id number]    The specimen is received in formalin labeled with the patient's name, medical record number, and designated &quot;L upper abdomen&quot; is a 0.6 x 0.6 cm unoriented skin shave, excised to a depth of 0.1 cm.  The epidermal surface is tan, smooth, and   glistening.  The biopsy margin is inked blue.  The specimen is trisected revealing white and homogenous cut surfaces.  The specimen is entirely submitted as follows:     [id number]:  Skin shave trisected  [id number]:  Additional minute fragments within specimen container     Margaret LeJeune, MS, PA([facility])       Microscopic Exam 04/10/2024 1. Sections show epidermis with parakeratosis and full-thickness cytologic atypia and architectural disorder of keratinocytes.  Scattered mitotic figures are present at all levels of the epidermis.  Deeper levels were reviewed.   Final    Disclaimer 04/10/2024 Unless the case is a 'gross only' or additional testing only, the final diagnosis for each specimen is based on a microscopic examination of appropriate tissue sections.   Final       Past Medical History:   Diagnosis Date    Anisometropia     Cataract     HTN (hypertension) 11/7/2012    Hyperopia     OD    Hypertension     Osteoporosis screening      Past Surgical History:   Procedure Laterality Date    CATARACT EXTRACTION W/  INTRAOCULAR LENS IMPLANT Right  9/3/2020    Procedure: EXTRACTION, CATARACT, WITH IOL INSERTION;  Surgeon: Ambika Begum MD;  Location: [facility] OR;  Service: Ophthalmology;  Laterality: Right;    CATARACT EXTRACTION W/  INTRAOCULAR LENS IMPLANT Left 2020    Procedure: EXTRACTION, CATARACT, WITH IOL INSERTION;  Surgeon: Ambika Begum MD;  Location: [facility] OR;  Service: Ophthalmology;  Laterality: Left;    CHOLECYSTECTOMY      COLONOSCOPY N/A 3/8/2022    Procedure: COLONOSCOPY;  Surgeon: MENA Patel MD;  Location: [facility] ENDO ([facility] FLR);  Service: Endoscopy;  Laterality: N/A;  prep ins. emailed / COVID screening [place] 3/5/22- ERW    EYE SURGERY       Social History     Tobacco Use    Smoking status: Former     Current packs/day: 0.00     Average packs/day: 0.3 packs/day for 10.0 years (2.5 ttl pk-yrs)     Types: Cigarettes     Start date: 1975     Quit date: 1985     Years since quittin.5    Smokeless tobacco: Never   Substance Use Topics    Alcohol use: Yes     Alcohol/week: 4.0 standard drinks of alcohol     Types: 4 Glasses of wine per week     Comment: socially, glass of wine daily    Drug use: Never     Family History   Problem Relation Name Age of Onset    Cataracts Mother      Macular degeneration Mother      Hypertension Mother      Cataracts Father      Hypertension Father      Heart attack Father      No Known Problems Sister x1     No Known Problems Son      No Known Problems Son      Heart disease Paternal Uncle       OB History    Para Term  AB Living   2 2 2     2   SAB IAB Ectopic Multiple Live Births           2      # Outcome Date GA Lbr Jon/2nd Weight Sex Type Anes PTL Lv   2 Term         BETH   1 Term         BETH       Current Outpatient Medications:     estradioL (VIVELLE-DOT) 0.025 mg/24 hr, Apply patch twice a week (Monday and Thursday), Disp: 24 patch, Rfl: 3    fluorouraciL (EFUDEX) 5 % cream, Apply topically 2 (two) times daily. Apply to skin cancer on abdomen for 4 weeks, Disp: 40 g, Rfl: 0    " fluticasone propionate (FLONASE) 50 mcg/actuation nasal spray, SPRAY 2 SPRAYS BY EACH NOSTRIL ROUTE ONCE DAILY., Disp: 48 mL, Rfl: 3    minoxidiL (LONITEN) 2.5 MG tablet, Take 1 tablet (2.5 mg total) by mouth once daily., Disp: 30 tablet, Rfl: 5    progesterone (PROMETRIUM) 100 MG capsule, Take 1 capsule (100 mg total) by mouth every evening., Disp: 90 capsule, Rfl: 3    propranoloL (INDERAL) 20 MG tablet, Take 1 tablet (20 mg total) by mouth 2 (two) times daily as needed (tremor)., Disp: 60 tablet, Rfl: 5    rosuvastatin (CRESTOR) 5 MG tablet, Take 1 tablet (5 mg total) by mouth once daily., Disp: 90 tablet, Rfl: 3    spironolactone-hydrochlorothiazide 25-25mg (ALDACTAZIDE) 25-25 mg Tab, Take 1 tablet by mouth once daily., Disp: 90 tablet, Rfl: 3    vitamin D (VITAMIN D3) 1000 units Tab, Take 1,000 Units by mouth once daily., Disp: , Rfl:     Review of Systems:  General: No fever, chills, or weight loss.  Chest: No chest pain, shortness of breath, or palpitations.  Breast: No pain, masses, or nipple discharge.  Vulva: No pain, lesions, or itching.  Vagina: No relaxation, itching, discharge, or lesions.  Abdomen: No pain, nausea, vomiting, diarrhea, or constipation.  Urinary: No incontinence, nocturia, frequency, or dysuria.  Extremities:  No leg cramps, edema, or calf pain.  Neurologic: No headaches, dizziness, or visual changes.    Objective:     Vitals:    07/03/24 0834   BP: (!) 108/55   Pulse: 76   Weight: 70.4 kg (155 lb 3.2 oz)   Height: 5' 4" (1.626 m)   PainSc: 0-No pain     Body mass index is 26.64 kg/m².      Physical Exam: Deferred       Assessment:    Menopausal symptoms  -     progesterone (PROMETRIUM) 100 MG capsule; Take 1 capsule (100 mg total) by mouth every evening.  Dispense: 90 capsule; Refill: 3  -     estradioL (VIVELLE-DOT) 0.025 mg/24 hr; Apply patch twice a week (Monday and Thursday)  Dispense: 24 patch; Refill: 3        Plan:   Continue vivelle dot 0.025mg BIW  Continue progesterone 100mg " QHS  Annual mammogram  Follow up annually or sooner PRN    Instructed patient to call if she experiences any side effects or has any questions.    I spent a total of 20 minutes on the day of the visit.This includes face to face time and non-face to face time preparing to see the patient (eg, review of tests), obtaining and/or reviewing separately obtained history, documenting clinical information in the electronic or other health record, independently interpreting results and communicating results to the patient/family/caregiver, or care coordinator.

## 2024-07-08 ENCOUNTER — LAB VISIT (OUTPATIENT)
Dept: LAB | Facility: HOSPITAL | Age: 69
End: 2024-07-08
Payer: MEDICARE

## 2024-07-08 DIAGNOSIS — E78.2 MIXED HYPERLIPIDEMIA: ICD-10-CM

## 2024-07-08 LAB
CHOLEST SERPL-MCNC: 142 MG/DL (ref 120–199)
CHOLEST/HDLC SERPL: 2.8 {RATIO} (ref 2–5)
HDLC SERPL-MCNC: 50 MG/DL (ref 40–75)
HDLC SERPL: 35.2 % (ref 20–50)
LDLC SERPL CALC-MCNC: 76 MG/DL (ref 63–159)
NONHDLC SERPL-MCNC: 92 MG/DL
TRIGL SERPL-MCNC: 80 MG/DL (ref 30–150)

## 2024-07-08 PROCEDURE — 36415 COLL VENOUS BLD VENIPUNCTURE: CPT | Performed by: INTERNAL MEDICINE

## 2024-07-08 PROCEDURE — 80061 LIPID PANEL: CPT | Performed by: INTERNAL MEDICINE

## 2024-09-02 ENCOUNTER — PATIENT MESSAGE (OUTPATIENT)
Dept: PRIMARY CARE CLINIC | Facility: CLINIC | Age: 69
End: 2024-09-02
Payer: MEDICARE

## 2024-09-15 ENCOUNTER — PATIENT MESSAGE (OUTPATIENT)
Dept: PRIMARY CARE CLINIC | Facility: CLINIC | Age: 69
End: 2024-09-15
Payer: MEDICARE

## 2024-09-16 RX ORDER — SEMAGLUTIDE 0.25 MG/.5ML
0.25 INJECTION, SOLUTION SUBCUTANEOUS WEEKLY
Qty: 2 ML | Refills: 2 | Status: SHIPPED | OUTPATIENT
Start: 2024-09-16 | End: 2024-09-16

## 2024-09-16 RX ORDER — SEMAGLUTIDE 0.68 MG/ML
0.25 INJECTION, SOLUTION SUBCUTANEOUS
Qty: 1.5 ML | Refills: 2 | Status: SHIPPED | OUTPATIENT
Start: 2024-09-16 | End: 2024-12-15

## 2024-09-16 RX ORDER — SEMAGLUTIDE 0.25 MG/.5ML
INJECTION, SOLUTION SUBCUTANEOUS
Refills: 2 | OUTPATIENT
Start: 2024-09-16

## 2024-09-16 NOTE — TELEPHONE ENCOUNTER
Spoke to patient regarding ozempic prescription and insurance coverage based on diagnosis. Patient asked if physician would send the wegovy and she would contact her insurance to confirm coverage.

## 2024-09-25 ENCOUNTER — OFFICE VISIT (OUTPATIENT)
Dept: DERMATOLOGY | Facility: CLINIC | Age: 69
End: 2024-09-25
Payer: MEDICARE

## 2024-09-25 DIAGNOSIS — Z12.83 SCREENING EXAM FOR SKIN CANCER: ICD-10-CM

## 2024-09-25 DIAGNOSIS — Z85.828 HISTORY OF NONMELANOMA SKIN CANCER: ICD-10-CM

## 2024-09-25 DIAGNOSIS — D22.9 MULTIPLE BENIGN NEVI: ICD-10-CM

## 2024-09-25 DIAGNOSIS — L82.1 SEBORRHEIC KERATOSES: ICD-10-CM

## 2024-09-25 DIAGNOSIS — D18.01 CHERRY ANGIOMA: ICD-10-CM

## 2024-09-25 DIAGNOSIS — L81.4 LENTIGO: ICD-10-CM

## 2024-09-25 DIAGNOSIS — D04.9 SQUAMOUS CELL CARCINOMA IN SITU (SCCIS) OF SKIN: Primary | ICD-10-CM

## 2024-09-25 PROCEDURE — 1101F PT FALLS ASSESS-DOCD LE1/YR: CPT | Mod: CPTII,S$GLB,, | Performed by: STUDENT IN AN ORGANIZED HEALTH CARE EDUCATION/TRAINING PROGRAM

## 2024-09-25 PROCEDURE — 99999 PR PBB SHADOW E&M-EST. PATIENT-LVL III: CPT | Mod: PBBFAC,,, | Performed by: STUDENT IN AN ORGANIZED HEALTH CARE EDUCATION/TRAINING PROGRAM

## 2024-09-25 PROCEDURE — 1126F AMNT PAIN NOTED NONE PRSNT: CPT | Mod: CPTII,S$GLB,, | Performed by: STUDENT IN AN ORGANIZED HEALTH CARE EDUCATION/TRAINING PROGRAM

## 2024-09-25 PROCEDURE — 3288F FALL RISK ASSESSMENT DOCD: CPT | Mod: CPTII,S$GLB,, | Performed by: STUDENT IN AN ORGANIZED HEALTH CARE EDUCATION/TRAINING PROGRAM

## 2024-09-25 PROCEDURE — 1157F ADVNC CARE PLAN IN RCRD: CPT | Mod: CPTII,S$GLB,, | Performed by: STUDENT IN AN ORGANIZED HEALTH CARE EDUCATION/TRAINING PROGRAM

## 2024-09-25 PROCEDURE — 1159F MED LIST DOCD IN RCRD: CPT | Mod: CPTII,S$GLB,, | Performed by: STUDENT IN AN ORGANIZED HEALTH CARE EDUCATION/TRAINING PROGRAM

## 2024-09-25 PROCEDURE — 17262 DSTRJ MAL LES T/A/L 1.1-2.0: CPT | Mod: S$GLB,,, | Performed by: STUDENT IN AN ORGANIZED HEALTH CARE EDUCATION/TRAINING PROGRAM

## 2024-09-25 PROCEDURE — 99213 OFFICE O/P EST LOW 20 MIN: CPT | Mod: 25,S$GLB,, | Performed by: STUDENT IN AN ORGANIZED HEALTH CARE EDUCATION/TRAINING PROGRAM

## 2024-09-25 PROCEDURE — 1160F RVW MEDS BY RX/DR IN RCRD: CPT | Mod: CPTII,S$GLB,, | Performed by: STUDENT IN AN ORGANIZED HEALTH CARE EDUCATION/TRAINING PROGRAM

## 2024-09-25 NOTE — PATIENT INSTRUCTIONS
Wound Care    A band aid and vaseline ointment has been placed over the site.  This should remain in place for 24 hours.  It is recommended that you keep the area dry for the first 24 hours.  After 24 hours, you may remove the band aid and wash the area with warm soap and water and apply Vaseline jelly.  Many patients prefer to use Neosporin or Bacitracin ointment.  This is acceptable; however, know that you can develop an allergy to this medication even if you have used it safely for years.  It is important to keep the area moist.  Letting it dry out and get air slows healing time, and will worsen the scar.  Band aid is optional after first 24 hours.      If you notice increasing redness, tenderness, pain, or yellow drainage at the site, please notify your doctor.  These are signs of an infection.    If your site is bleeding, apply firm pressure for 15 minutes straight.  Repeat for another 15 minutes, if it is still bleeding.   If the surgical site continues to bleed, then please contact your doctor.      [address] [place] [address]/ [phone] FAX/ [email or web address]            Sunscreen Guidelines  Sunscreen protects your skin by absorbing and reflecting ultraviolet rays. All sunscreens have a sun protection factor (SPF) rating that indicates how long a sunscreen will remain effective on the skin.    Why protect your skin?  The sun's rays are composed of many different wavelengths, including UVA, UVB, and visible light that each affect the skin differently.    UVB: sunburn, photoaging, skin cancer (melanoma, basal cell, and squamous cell carcinomas) and modulation of the skin's immune system.    UVA: similar to above but thought to contribute more to aging; at the same dose of UVB it is less powerful however the sun has 10-20 times the levels of UVA as compared with UVB.  Visible light: implicated in causing unwanted darkening of skin, such as melasma and post-inflammatory  hyperpigmentation in darker skin types     If I have dark skin, do I need to worry about the sun?    More darkly pigmented skin is more protected against UV-induced skin cancer, sunburn, and photoaging, though may still suffer from sun-related conditions, including melasma, hyperpigmentation, and other dark spots.    Sun avoidance  As a general rule, stay out of the sun as much as possible between 10 a.m. - 4 p.m.    Download the EPA UV index jennifer to track the UV index by hour in your zip code.      Which sunscreen should I choose?  The best sunscreen to use varies by individual. The one that feels best on your skin and fits your lifestyle will be the one you will likely use most regularly.   Active ingredients of sunscreen vary by , and may be a chemical (such as avobenzone or oxybenzone) or physical agent (such as zinc oxide or titanium dioxide). I recommend a physical agent.  A water-resistant sunscreen is one that maintains the SPF level after 40 minutes of water exposure. A very water-resistant sunscreen maintains the SPF level after 80 minutes of water exposure.    Sunscreen: this is the last layer in sun protection   Be generous: 1 shot glass of sunscreen for your body, ½ teaspoon for your face/neck  Reapply every 2 hours  Broad spectrum (provides UVA/UVB protection), SPF 50 or above  Avoid spray sunscreens: less effective and have been found to contain benzene (carcinogen)    Sun protective clothing  Although sunscreen helps minimize sun damage, no sunscreen completely blocks all wavelengths of UV light. Wearing sun protective clothing such as hats, rashguards or swim shirts, and long sleeves and/or pants, as well as avoiding sun exposure from 10 a.m. to 4 p.m. will help protect your skin from overexposure and minimize sun damage. Seek shade.  Long sleeved clothing, hats, and sunglasses: makes sun protection easier, more effective, and can even be more affordable, since sunscreen needs to be  "reapplied frequently.    Solumbra (www.sunprectautions.com)  Soila Life (www.cabanalife.com)  Coolibar (www.coolibar.[employer])  Land's end (www.Social & Loyal.com)  Hats from Ludmila Pedro (www.helenkaminski.[email or web address])    My Favorite Sunscreens:  Physical blockers: Can have a "white case" but in general are more effective  - Face: CeraVe tinted mineral sunscreen, Bare Minerals complexion rescue (20 shades), Elta MD (UV elements, UV physical, UV restore, etc), Tizo ultra zinc tinted, Cetaphil Sheer Mineral Face Liquid Sunscreen  - Body: Blue Lizard, Neutrogena Sheer Zinc, Eucerin Daily Protection, Aveeno Baby   "

## 2024-09-25 NOTE — PROGRESS NOTES
Subjective:      Patient ID:  Jyoti Tyler is a 69 y.o. female who presents for   Chief Complaint   Patient presents with    Skin Check     Pt here for UBSE and Efudex f/u     Pt had bx of L upper abdomen- SCC in situ- Pt treated spot with Efudex for 30 days    Pt denies any new changing, bleeding or growing lesions today.      Follow-up      H/o SCCis upper abdomen. Treated with efudex x 4 weeks. Seen for f/u and still with some residual. Treated with LN2 and another round of efudex. Here for f/u and UBSE    Review of Systems   Constitutional:  Negative for fever, chills and fatigue.   Skin:  Positive for daily sunscreen use and wears hat. Negative for recent sunburn.   Hematologic/Lymphatic: Does not bruise/bleed easily.       Objective:   Physical Exam   Constitutional: She appears well-developed and well-nourished. No distress.   Neurological: She is alert and oriented to person, place, and time. She is not disoriented.   Psychiatric: She has a normal mood and affect.   Skin:   Areas Examined (abnormalities noted in diagram):   Scalp / Hair Palpated and Inspected  Head / Face Inspection Performed  Neck Inspection Performed  Chest / Axilla Inspection Performed  Abdomen Inspection Performed  Genitals / Buttocks / Groin Inspection Performed  Back Inspection Performed  RUE Inspected  LUE Inspection Performed  Nails and Digits Inspection Performed                 Diagram Legend     Erythematous scaling macule/papule c/w actinic keratosis       Vascular papule c/w angioma      Pigmented verrucoid papule/plaque c/w seborrheic keratosis      Yellow umbilicated papule c/w sebaceous hyperplasia      Irregularly shaped tan macule c/w lentigo     1-2 mm smooth white papules consistent with Milia      Movable subcutaneous cyst with punctum c/w epidermal inclusion cyst      Subcutaneous movable cyst c/w pilar cyst      Firm pink to brown papule c/w dermatofibroma      Pedunculated fleshy papule(s) c/w skin tag(s)       Evenly pigmented macule c/w junctional nevus     Mildly variegated pigmented, slightly irregular-bordered macule c/w mildly atypical nevus      Flesh colored to evenly pigmented papule c/w intradermal nevus       Pink pearly papule/plaque c/w basal cell carcinoma      Erythematous hyperkeratotic cursted plaque c/w SCC      Surgical scar with no sign of skin cancer recurrence      Open and closed comedones      Inflammatory papules and pustules      Verrucoid papule consistent consistent with wart     Erythematous eczematous patches and plaques     Dystrophic onycholytic nail with subungual debris c/w onychomycosis     Umbilicated papule    Erythematous-base heme-crusted tan verrucoid plaque consistent with inflamed seborrheic keratosis     Erythematous Silvery Scaling Plaque c/w Psoriasis     See annotation            Assessment / Plan:        Squamous cell carcinoma in situ (SCCIS) of skin    - has significantly improved and about 95% resolved with two rounds of efudex and cryo but still some residual. Will treat areas of clinical residual with ED&C and monitor for recurrence      Here for electrodesiccation and curettage of SCCis on the left upper abdomen. bx done on 4/10/24:      Electrodessication and Curettage Procedure note:    Verbal consent obtained. Lesional tissue marked and prepped with alcohol. Lesion anesthetized with 1% lidocaine with epinephrine. Curettage and Desiccation x 3 cycles to base. Aluminum chloride for hemostasis. Lesion size after primary curettage: 1.5 cm    Area bandaged and wound care explained.      Seborrheic keratoses  Multiple benign nevi  Lentigo  Cherry angioma  Reassurance given to patient. No treatment is necessary.   Treatment of benign, asymptomatic lesions may be considered cosmetic.    History of nonmelanoma skin cancer  Screening exam for skin cancer  Area(s) of previous NMSC evaluated--recurrence treated as above    Upper body skin examination performed today including at  least 6 points as noted in physical examination. No lesions suspicious for malignancy noted.    Recommend daily sun protection/avoidance and use of at least SPF 30, broad spectrum sunscreen (OTC drug).              Follow up in about 6 months (around 3/25/2025) for UBSE.

## 2024-09-25 NOTE — PROGRESS NOTES
Bone density stable in hip and slightly improved in lumbar spine.  Continue walking and weight bearing exercises.  Adequate calcium and vitamin D.  Repeat bmd in 2-4 years.    SKY Weinberg NP 

## 2024-10-25 RX ORDER — PROPRANOLOL HYDROCHLORIDE 20 MG/1
20 TABLET ORAL 2 TIMES DAILY PRN
Qty: 180 TABLET | Refills: 1 | Status: SHIPPED | OUTPATIENT
Start: 2024-10-25

## 2024-11-20 ENCOUNTER — OFFICE VISIT (OUTPATIENT)
Dept: PRIMARY CARE CLINIC | Facility: CLINIC | Age: 69
End: 2024-11-20
Payer: MEDICARE

## 2024-11-20 VITALS
SYSTOLIC BLOOD PRESSURE: 102 MMHG | RESPIRATION RATE: 16 BRPM | WEIGHT: 148.13 LBS | OXYGEN SATURATION: 98 % | HEIGHT: 64 IN | HEART RATE: 74 BPM | BODY MASS INDEX: 25.29 KG/M2 | DIASTOLIC BLOOD PRESSURE: 66 MMHG

## 2024-11-20 DIAGNOSIS — E78.2 MIXED HYPERLIPIDEMIA: ICD-10-CM

## 2024-11-20 DIAGNOSIS — G25.0 BENIGN ESSENTIAL TREMOR: Primary | ICD-10-CM

## 2024-11-20 DIAGNOSIS — I10 ESSENTIAL HYPERTENSION: ICD-10-CM

## 2024-11-20 PROCEDURE — 1126F AMNT PAIN NOTED NONE PRSNT: CPT | Mod: CPTII,S$GLB,, | Performed by: STUDENT IN AN ORGANIZED HEALTH CARE EDUCATION/TRAINING PROGRAM

## 2024-11-20 PROCEDURE — 1160F RVW MEDS BY RX/DR IN RCRD: CPT | Mod: CPTII,S$GLB,, | Performed by: STUDENT IN AN ORGANIZED HEALTH CARE EDUCATION/TRAINING PROGRAM

## 2024-11-20 PROCEDURE — 3074F SYST BP LT 130 MM HG: CPT | Mod: CPTII,S$GLB,, | Performed by: STUDENT IN AN ORGANIZED HEALTH CARE EDUCATION/TRAINING PROGRAM

## 2024-11-20 PROCEDURE — 99214 OFFICE O/P EST MOD 30 MIN: CPT | Mod: S$GLB,,, | Performed by: STUDENT IN AN ORGANIZED HEALTH CARE EDUCATION/TRAINING PROGRAM

## 2024-11-20 PROCEDURE — 3078F DIAST BP <80 MM HG: CPT | Mod: CPTII,S$GLB,, | Performed by: STUDENT IN AN ORGANIZED HEALTH CARE EDUCATION/TRAINING PROGRAM

## 2024-11-20 PROCEDURE — 1159F MED LIST DOCD IN RCRD: CPT | Mod: CPTII,S$GLB,, | Performed by: STUDENT IN AN ORGANIZED HEALTH CARE EDUCATION/TRAINING PROGRAM

## 2024-11-20 PROCEDURE — 1157F ADVNC CARE PLAN IN RCRD: CPT | Mod: CPTII,S$GLB,, | Performed by: STUDENT IN AN ORGANIZED HEALTH CARE EDUCATION/TRAINING PROGRAM

## 2024-11-20 PROCEDURE — 3288F FALL RISK ASSESSMENT DOCD: CPT | Mod: CPTII,S$GLB,, | Performed by: STUDENT IN AN ORGANIZED HEALTH CARE EDUCATION/TRAINING PROGRAM

## 2024-11-20 PROCEDURE — 99999 PR PBB SHADOW E&M-EST. PATIENT-LVL III: CPT | Mod: PBBFAC,,, | Performed by: STUDENT IN AN ORGANIZED HEALTH CARE EDUCATION/TRAINING PROGRAM

## 2024-11-20 PROCEDURE — 1101F PT FALLS ASSESS-DOCD LE1/YR: CPT | Mod: CPTII,S$GLB,, | Performed by: STUDENT IN AN ORGANIZED HEALTH CARE EDUCATION/TRAINING PROGRAM

## 2024-11-20 PROCEDURE — 3008F BODY MASS INDEX DOCD: CPT | Mod: CPTII,S$GLB,, | Performed by: STUDENT IN AN ORGANIZED HEALTH CARE EDUCATION/TRAINING PROGRAM

## 2024-11-20 RX ORDER — HYDROCHLOROTHIAZIDE 25 MG/1
25 TABLET ORAL DAILY
Qty: 30 TABLET | Refills: 11 | Status: SHIPPED | OUTPATIENT
Start: 2024-11-20 | End: 2025-11-20

## 2024-11-20 RX ORDER — PROPRANOLOL HYDROCHLORIDE 20 MG/1
30 TABLET ORAL 2 TIMES DAILY PRN
Start: 2024-11-20

## 2024-11-20 RX ORDER — SEMAGLUTIDE 0.25 MG/.5ML
0.25 INJECTION, SOLUTION SUBCUTANEOUS WEEKLY
COMMUNITY
Start: 2024-10-15

## 2024-11-20 NOTE — PROGRESS NOTES
Clinic Note  11/20/2024      Subjective:       Patient ID:  Jyoti is a 69 y.o. female being seen for a f/u visit    Chief Complaint: Follow-up    HPI  Jyoti Tyler is a 68 y.o.  female who presents with allergies, carotid artery disease (20-39%), HTN, osteoporosis, here for a f/u visit  -feels like does not have much energy. She is on hormonal therapy. She does take propranolol for the tremor which I started last visit. Her BP is also low right now. Since I started propanolol, I can decrease her BP medication. She is also on wegovy, I asked her to drink more water.   -no falls in the last year  -no hospitalizations       Review of Systems   Constitutional:  Negative for chills, fever and weight loss.   HENT:  Negative for congestion.    Respiratory:  Negative for cough and shortness of breath.    Cardiovascular:  Negative for chest pain.   Gastrointestinal:  Negative for abdominal pain, blood in stool, constipation and diarrhea.   Genitourinary:  Negative for dysuria and hematuria.   Neurological:  Positive for tremors. Negative for dizziness and headaches.       Medication List with Changes/Refills   New Medications    HYDROCHLOROTHIAZIDE (HYDRODIURIL) 25 MG TABLET    Take 1 tablet (25 mg total) by mouth once daily.   Current Medications    ESTRADIOL (VIVELLE-DOT) 0.025 MG/24 HR    Apply patch twice a week (Monday and Thursday)    FLUOROURACIL (EFUDEX) 5 % CREAM    Apply topically 2 (two) times daily. Apply to skin cancer on abdomen for 4 weeks    FLUTICASONE PROPIONATE (FLONASE) 50 MCG/ACTUATION NASAL SPRAY    SPRAY 2 SPRAYS BY EACH NOSTRIL ROUTE ONCE DAILY.    MINOXIDIL (LONITEN) 2.5 MG TABLET    TAKE 1 TABLET BY MOUTH EVERY DAY    PROGESTERONE (PROMETRIUM) 100 MG CAPSULE    Take 1 capsule (100 mg total) by mouth every evening.    ROSUVASTATIN (CRESTOR) 5 MG TABLET    Take 1 tablet (5 mg total) by mouth once daily.    VITAMIN D (VITAMIN D3) 1000 UNITS TAB    Take 1,000 Units by mouth once daily.     "WEGOVY 0.25 MG/0.5 ML PNIJ    Inject 0.25 mg into the skin once a week.   Changed and/or Refilled Medications    Modified Medication Previous Medication    PROPRANOLOL (INDERAL) 20 MG TABLET propranoloL (INDERAL) 20 MG tablet       Take 1.5 tablets (30 mg total) by mouth 2 (two) times daily as needed (tremor).    TAKE 1 TABLET (20 MG TOTAL) BY MOUTH 2 (TWO) TIMES DAILY AS NEEDED (TREMOR).   Discontinued Medications    SEMAGLUTIDE (OZEMPIC) 0.25 MG OR 0.5 MG (2 MG/3 ML) PEN INJECTOR    Inject 0.25 mg into the skin every 7 days.    SPIRONOLACTONE-HYDROCHLOROTHIAZIDE 25-25MG (ALDACTAZIDE) 25-25 MG TAB    Take 1 tablet by mouth once daily.           Objective:      /66 (BP Location: Right arm, Patient Position: Sitting)   Pulse 74   Resp 16   Ht 5' 4" (1.626 m)   Wt 67.2 kg (148 lb 2.4 oz)   LMP 01/01/2000 (Approximate)   SpO2 98%   BMI 25.43 kg/m²   Estimated body mass index is 25.43 kg/m² as calculated from the following:    Height as of this encounter: 5' 4" (1.626 m).    Weight as of this encounter: 67.2 kg (148 lb 2.4 oz).  Physical Exam  Constitutional:       Appearance: Normal appearance.   HENT:      Right Ear: Tympanic membrane normal. There is no impacted cerumen.      Left Ear: Tympanic membrane normal. There is no impacted cerumen.      Mouth/Throat:      Mouth: Mucous membranes are moist.      Pharynx: Oropharynx is clear.   Eyes:      Conjunctiva/sclera: Conjunctivae normal.   Cardiovascular:      Rate and Rhythm: Normal rate and regular rhythm.      Pulses: Normal pulses.      Heart sounds: Normal heart sounds.   Pulmonary:      Effort: Pulmonary effort is normal. No respiratory distress.      Breath sounds: Normal breath sounds.   Abdominal:      General: Bowel sounds are normal.   Musculoskeletal:      Right lower leg: No edema.      Left lower leg: No edema.   Skin:     General: Skin is warm.   Neurological:      Mental Status: She is alert and oriented to person, place, and time. "   Psychiatric:         Mood and Affect: Mood normal.         Behavior: Behavior normal.           Assessment and Plan:     1. Benign essential tremor  Assessment & Plan:  Propranolol helps with the tremor. Taking it once a day but some days its worse so has to take it twice a day. Will increase dose to 30 mg, she will take 1.5 tablet instead of just one tablet       2. Essential hypertension  Overview:  2012    Assessment & Plan:  BP Is on the low side. Could be making her feel tired. She is also taking propranolol for the tremor which could be lowering the pressure. Also lost weight which will also lower the pressure. Will stop spironolactone and continue hctz 25 mg. Will do labs in one week. Asked pt to let me know how her pressures are doing.     Orders:  -     TSH; Future; Expected date: 11/20/2024  -     CBC Auto Differential; Future; Expected date: 11/20/2024  -     Comprehensive Metabolic Panel; Future; Expected date: 11/20/2024    3. Mixed hyperlipidemia  Assessment & Plan:  LDL of 76. Well controlled on rosuvastatin 5 mg daily, continue.       Other orders  -     hydroCHLOROthiazide (HYDRODIURIL) 25 MG tablet; Take 1 tablet (25 mg total) by mouth once daily.  Dispense: 30 tablet; Refill: 11  -     propranoloL (INDERAL) 20 MG tablet; Take 1.5 tablets (30 mg total) by mouth 2 (two) times daily as needed (tremor).          Follow Up:   Follow up in about 6 months (around 5/20/2025).    Other Orders Placed This Visit:  Orders Placed This Encounter   Procedures    TSH    CBC Auto Differential    Comprehensive Metabolic Panel         Kt Lux

## 2024-11-20 NOTE — ASSESSMENT & PLAN NOTE
BP Is on the low side. Could be making her feel tired. She is also taking propranolol for the tremor which could be lowering the pressure. Also lost weight which will also lower the pressure. Will stop spironolactone and continue hctz 25 mg. Will do labs in one week. Asked pt to let me know how her pressures are doing.

## 2024-11-20 NOTE — ASSESSMENT & PLAN NOTE
Propranolol helps with the tremor. Taking it once a day but some days its worse so has to take it twice a day. Will increase dose to 30 mg, she will take 1.5 tablet instead of just one tablet

## 2024-11-27 ENCOUNTER — LAB VISIT (OUTPATIENT)
Dept: LAB | Facility: HOSPITAL | Age: 69
End: 2024-11-27
Attending: STUDENT IN AN ORGANIZED HEALTH CARE EDUCATION/TRAINING PROGRAM
Payer: MEDICARE

## 2024-11-27 ENCOUNTER — PATIENT MESSAGE (OUTPATIENT)
Dept: PRIMARY CARE CLINIC | Facility: CLINIC | Age: 69
End: 2024-11-27
Payer: MEDICARE

## 2024-11-27 DIAGNOSIS — I10 ESSENTIAL HYPERTENSION: ICD-10-CM

## 2024-11-27 DIAGNOSIS — E87.6 HYPOKALEMIA: Primary | ICD-10-CM

## 2024-11-27 LAB
ALBUMIN SERPL BCP-MCNC: 3.8 G/DL (ref 3.5–5.2)
ALP SERPL-CCNC: 67 U/L (ref 40–150)
ALT SERPL W/O P-5'-P-CCNC: 16 U/L (ref 10–44)
ANION GAP SERPL CALC-SCNC: 17 MMOL/L (ref 8–16)
AST SERPL-CCNC: 16 U/L (ref 10–40)
BASOPHILS # BLD AUTO: 0.05 K/UL (ref 0–0.2)
BASOPHILS NFR BLD: 0.7 % (ref 0–1.9)
BILIRUB SERPL-MCNC: 0.8 MG/DL (ref 0.1–1)
BUN SERPL-MCNC: 16 MG/DL (ref 8–23)
CALCIUM SERPL-MCNC: 9.9 MG/DL (ref 8.7–10.5)
CHLORIDE SERPL-SCNC: 96 MMOL/L (ref 95–110)
CO2 SERPL-SCNC: 24 MMOL/L (ref 23–29)
CREAT SERPL-MCNC: 0.7 MG/DL (ref 0.5–1.4)
DIFFERENTIAL METHOD BLD: ABNORMAL
EOSINOPHIL # BLD AUTO: 0.1 K/UL (ref 0–0.5)
EOSINOPHIL NFR BLD: 0.7 % (ref 0–8)
ERYTHROCYTE [DISTWIDTH] IN BLOOD BY AUTOMATED COUNT: 13.5 % (ref 11.5–14.5)
EST. GFR  (NO RACE VARIABLE): >60 ML/MIN/1.73 M^2
GLUCOSE SERPL-MCNC: 105 MG/DL (ref 70–110)
HCT VFR BLD AUTO: 43.4 % (ref 37–48.5)
HGB BLD-MCNC: 15 G/DL (ref 12–16)
IMM GRANULOCYTES # BLD AUTO: 0.02 K/UL (ref 0–0.04)
IMM GRANULOCYTES NFR BLD AUTO: 0.3 % (ref 0–0.5)
LYMPHOCYTES # BLD AUTO: 2.1 K/UL (ref 1–4.8)
LYMPHOCYTES NFR BLD: 27.5 % (ref 18–48)
MCH RBC QN AUTO: 29.5 PG (ref 27–31)
MCHC RBC AUTO-ENTMCNC: 34.6 G/DL (ref 32–36)
MCV RBC AUTO: 85 FL (ref 82–98)
MONOCYTES # BLD AUTO: 0.6 K/UL (ref 0.3–1)
MONOCYTES NFR BLD: 8.3 % (ref 4–15)
NEUTROPHILS # BLD AUTO: 4.7 K/UL (ref 1.8–7.7)
NEUTROPHILS NFR BLD: 62.5 % (ref 38–73)
NRBC BLD-RTO: 0 /100 WBC
PLATELET # BLD AUTO: 259 K/UL (ref 150–450)
PMV BLD AUTO: 8.6 FL (ref 9.2–12.9)
POTASSIUM SERPL-SCNC: 2.8 MMOL/L (ref 3.5–5.1)
PROT SERPL-MCNC: 6.9 G/DL (ref 6–8.4)
RBC # BLD AUTO: 5.08 M/UL (ref 4–5.4)
SODIUM SERPL-SCNC: 137 MMOL/L (ref 136–145)
TSH SERPL DL<=0.005 MIU/L-ACNC: 2.71 UIU/ML (ref 0.4–4)
WBC # BLD AUTO: 7.55 K/UL (ref 3.9–12.7)

## 2024-11-27 PROCEDURE — 80053 COMPREHEN METABOLIC PANEL: CPT | Performed by: STUDENT IN AN ORGANIZED HEALTH CARE EDUCATION/TRAINING PROGRAM

## 2024-11-27 PROCEDURE — 85025 COMPLETE CBC W/AUTO DIFF WBC: CPT | Performed by: STUDENT IN AN ORGANIZED HEALTH CARE EDUCATION/TRAINING PROGRAM

## 2024-11-27 PROCEDURE — 36415 COLL VENOUS BLD VENIPUNCTURE: CPT | Performed by: STUDENT IN AN ORGANIZED HEALTH CARE EDUCATION/TRAINING PROGRAM

## 2024-11-27 PROCEDURE — 84443 ASSAY THYROID STIM HORMONE: CPT | Performed by: STUDENT IN AN ORGANIZED HEALTH CARE EDUCATION/TRAINING PROGRAM

## 2024-11-27 RX ORDER — POTASSIUM CHLORIDE 20 MEQ/1
40 TABLET, EXTENDED RELEASE ORAL 2 TIMES DAILY
Qty: 8 TABLET | Refills: 0 | Status: SHIPPED | OUTPATIENT
Start: 2024-11-27 | End: 2024-11-29 | Stop reason: SDUPTHER

## 2024-11-27 NOTE — TELEPHONE ENCOUNTER
Spoke to patient. Potassium is low at 2.8. asked pt to  potassium from pharmacy. Take 40 meq as soon as she picks it up, take another 40 in 6 hrs and take 40 meq bid tomorrow. This happened because I stopped spironolactone and continued hctz. Pt dose not want to stop hctz since she retains fluid. Will check potassium Friday morning at 9 am. Will likely have to keep her on 20 meq bid after that. Asked pt to eat food rich in potassium

## 2024-11-29 ENCOUNTER — TELEPHONE (OUTPATIENT)
Dept: PRIMARY CARE CLINIC | Facility: CLINIC | Age: 69
End: 2024-11-29
Payer: MEDICARE

## 2024-11-29 ENCOUNTER — LAB VISIT (OUTPATIENT)
Dept: LAB | Facility: HOSPITAL | Age: 69
End: 2024-11-29
Attending: STUDENT IN AN ORGANIZED HEALTH CARE EDUCATION/TRAINING PROGRAM
Payer: MEDICARE

## 2024-11-29 ENCOUNTER — PATIENT MESSAGE (OUTPATIENT)
Dept: PRIMARY CARE CLINIC | Facility: CLINIC | Age: 69
End: 2024-11-29
Payer: MEDICARE

## 2024-11-29 DIAGNOSIS — E87.6 HYPOKALEMIA: ICD-10-CM

## 2024-11-29 DIAGNOSIS — E87.6 HYPOKALEMIA: Primary | ICD-10-CM

## 2024-11-29 LAB — POTASSIUM SERPL-SCNC: 3.5 MMOL/L (ref 3.5–5.1)

## 2024-11-29 PROCEDURE — 36415 COLL VENOUS BLD VENIPUNCTURE: CPT | Performed by: STUDENT IN AN ORGANIZED HEALTH CARE EDUCATION/TRAINING PROGRAM

## 2024-11-29 PROCEDURE — 84132 ASSAY OF SERUM POTASSIUM: CPT | Performed by: STUDENT IN AN ORGANIZED HEALTH CARE EDUCATION/TRAINING PROGRAM

## 2024-11-29 RX ORDER — POTASSIUM CHLORIDE 20 MEQ/1
20 TABLET, EXTENDED RELEASE ORAL 2 TIMES DAILY
Qty: 60 TABLET | Refills: 0 | Status: SHIPPED | OUTPATIENT
Start: 2024-11-29 | End: 2024-12-29

## 2024-11-29 NOTE — TELEPHONE ENCOUNTER
----- Message from Kt Lux MD sent at 11/29/2024  1:04 PM CST -----  Pls tell Potassium is nml. Will continue at 20 meq twice a day. Script sent to [facility]. will recheck potassium level in 1 week. Please help pt schedule.

## 2024-11-29 NOTE — TELEPHONE ENCOUNTER
Spoke with pt, discussed your message in its entirety, pt verbalized understanding.  Lab appt scheduled for 12/6.

## 2024-12-06 ENCOUNTER — LAB VISIT (OUTPATIENT)
Dept: LAB | Facility: HOSPITAL | Age: 69
End: 2024-12-06
Attending: STUDENT IN AN ORGANIZED HEALTH CARE EDUCATION/TRAINING PROGRAM
Payer: MEDICARE

## 2024-12-06 DIAGNOSIS — E87.6 HYPOKALEMIA: ICD-10-CM

## 2024-12-06 LAB — POTASSIUM SERPL-SCNC: 3.4 MMOL/L (ref 3.5–5.1)

## 2024-12-06 PROCEDURE — 84132 ASSAY OF SERUM POTASSIUM: CPT | Performed by: STUDENT IN AN ORGANIZED HEALTH CARE EDUCATION/TRAINING PROGRAM

## 2024-12-06 PROCEDURE — 36415 COLL VENOUS BLD VENIPUNCTURE: CPT | Performed by: STUDENT IN AN ORGANIZED HEALTH CARE EDUCATION/TRAINING PROGRAM

## 2024-12-16 ENCOUNTER — TELEPHONE (OUTPATIENT)
Dept: PRIMARY CARE CLINIC | Facility: CLINIC | Age: 69
End: 2024-12-16
Payer: MEDICARE

## 2024-12-16 ENCOUNTER — PATIENT MESSAGE (OUTPATIENT)
Dept: PRIMARY CARE CLINIC | Facility: CLINIC | Age: 69
End: 2024-12-16
Payer: MEDICARE

## 2024-12-16 RX ORDER — POTASSIUM CHLORIDE 20 MEQ/1
20 TABLET, EXTENDED RELEASE ORAL 2 TIMES DAILY
Qty: 60 TABLET | Refills: 6 | Status: SHIPPED | OUTPATIENT
Start: 2024-12-16 | End: 2025-07-14

## 2024-12-16 NOTE — TELEPHONE ENCOUNTER
----- Message from Kt Lux MD sent at 12/16/2024  2:35 PM CST -----  Please let patient know that her potassium is stable. Please ask patient to continue the current dose of potassium

## 2024-12-16 NOTE — TELEPHONE ENCOUNTER
Called PT. Informed PT that her potassium is stable. Informed PT the DR/ stated ,  to please continue the current dose of potassium medication.

## 2024-12-23 ENCOUNTER — OFFICE VISIT (OUTPATIENT)
Dept: DERMATOLOGY | Facility: CLINIC | Age: 69
End: 2024-12-23
Payer: MEDICARE

## 2024-12-23 DIAGNOSIS — Z85.828 HISTORY OF SKIN CANCER: Primary | ICD-10-CM

## 2024-12-23 DIAGNOSIS — D04.9 SQUAMOUS CELL CARCINOMA IN SITU (SCCIS) OF SKIN: ICD-10-CM

## 2024-12-23 PROCEDURE — 1160F RVW MEDS BY RX/DR IN RCRD: CPT | Mod: CPTII,S$GLB,, | Performed by: STUDENT IN AN ORGANIZED HEALTH CARE EDUCATION/TRAINING PROGRAM

## 2024-12-23 PROCEDURE — 1157F ADVNC CARE PLAN IN RCRD: CPT | Mod: CPTII,S$GLB,, | Performed by: STUDENT IN AN ORGANIZED HEALTH CARE EDUCATION/TRAINING PROGRAM

## 2024-12-23 PROCEDURE — 1159F MED LIST DOCD IN RCRD: CPT | Mod: CPTII,S$GLB,, | Performed by: STUDENT IN AN ORGANIZED HEALTH CARE EDUCATION/TRAINING PROGRAM

## 2024-12-23 PROCEDURE — 1126F AMNT PAIN NOTED NONE PRSNT: CPT | Mod: CPTII,S$GLB,, | Performed by: STUDENT IN AN ORGANIZED HEALTH CARE EDUCATION/TRAINING PROGRAM

## 2024-12-23 PROCEDURE — 99213 OFFICE O/P EST LOW 20 MIN: CPT | Mod: S$GLB,,, | Performed by: STUDENT IN AN ORGANIZED HEALTH CARE EDUCATION/TRAINING PROGRAM

## 2024-12-23 PROCEDURE — 3288F FALL RISK ASSESSMENT DOCD: CPT | Mod: CPTII,S$GLB,, | Performed by: STUDENT IN AN ORGANIZED HEALTH CARE EDUCATION/TRAINING PROGRAM

## 2024-12-23 PROCEDURE — 1101F PT FALLS ASSESS-DOCD LE1/YR: CPT | Mod: CPTII,S$GLB,, | Performed by: STUDENT IN AN ORGANIZED HEALTH CARE EDUCATION/TRAINING PROGRAM

## 2024-12-23 PROCEDURE — 99999 PR PBB SHADOW E&M-EST. PATIENT-LVL III: CPT | Mod: PBBFAC,,, | Performed by: STUDENT IN AN ORGANIZED HEALTH CARE EDUCATION/TRAINING PROGRAM

## 2024-12-23 NOTE — PROGRESS NOTES
Subjective:      Patient ID:  Jyoti Tyler is a 69 y.o. female who presents for   Chief Complaint   Patient presents with    Lesion     Follow up        Pt here for spot check s/p left upper abdomen     Pt was last seen on 09/25/2024 for UBSE     Pt had bx of L upper abdomen- SCC in situ- Pt treated spot with Efudex for 30 days    Pt denies any new changing, bleeding or growing lesions today.        Problem List Items Addressed This Visit          Oncology    History of skin cancer - Primary    Overview     4/2024: SCCis left upper abdomen s/p efudex bid x 4 weeks x 2 with residual. Treated with EDC 9/2024    Next TBSE due 3/2025          Other Visit Diagnoses       Squamous cell carcinoma in situ (SCCIS) of skin                  Review of Systems   Constitutional:  Negative for fever.   HENT:  Negative for sore throat.    Respiratory:  Negative for cough.        Objective:   Physical Exam   Constitutional: She appears well-developed and well-nourished. No distress.   Neurological: She is alert and oriented to person, place, and time. She is not disoriented.   Psychiatric: She has a normal mood and affect.   Skin:   Areas Examined (abnormalities noted in diagram):   Abdomen Inspection Performed            Diagram Legend     Erythematous scaling macule/papule c/w actinic keratosis       Vascular papule c/w angioma      Pigmented verrucoid papule/plaque c/w seborrheic keratosis      Yellow umbilicated papule c/w sebaceous hyperplasia      Irregularly shaped tan macule c/w lentigo     1-2 mm smooth white papules consistent with Milia      Movable subcutaneous cyst with punctum c/w epidermal inclusion cyst      Subcutaneous movable cyst c/w pilar cyst      Firm pink to brown papule c/w dermatofibroma      Pedunculated fleshy papule(s) c/w skin tag(s)      Evenly pigmented macule c/w junctional nevus     Mildly variegated pigmented, slightly irregular-bordered macule c/w mildly atypical nevus      Flesh colored to  evenly pigmented papule c/w intradermal nevus       Pink pearly papule/plaque c/w basal cell carcinoma      Erythematous hyperkeratotic cursted plaque c/w SCC      Surgical scar with no sign of skin cancer recurrence      Open and closed comedones      Inflammatory papules and pustules      Verrucoid papule consistent consistent with wart     Erythematous eczematous patches and plaques     Dystrophic onycholytic nail with subungual debris c/w onychomycosis     Umbilicated papule    Erythematous-base heme-crusted tan verrucoid plaque consistent with inflamed seborrheic keratosis     Erythematous Silvery Scaling Plaque c/w Psoriasis     See annotation      Assessment / Plan:        History of skin cancer    Squamous cell carcinoma in situ (SCCIS) of skin  - s/p efudex and EDC with no residual on exam today. No additional teratment needed    F/u 3 month tbse    - Recommended the use of daily sunscreen and counseled on its role as a preventative measure for photoaging, sunburns, and skin cancer and to prevent the worsening of photodermatoses and pigmentary disorders (eg, melasma and postinflammatory hyperpigmentation). Preferred products at least SPF 30 and are mineral based such as Elta MD tinted broad spectrum SPF 40, Neutrogenia Sheer Zinc SPF 50, CeraVe Tinted mineral SPF 30, Blue lizard mineral sunscreen, Sun Bum mineral sunscreen. Handout provided   - Also counseled on the use of photoprotective clothing, such as from Coolibar and Solumbra   - Avoiding peak sunlight hours from 10 am - 4 pm              Follow up in about 3 months (around 3/23/2025) for TBSE.

## 2025-01-13 ENCOUNTER — PATIENT MESSAGE (OUTPATIENT)
Dept: PRIMARY CARE CLINIC | Facility: CLINIC | Age: 70
End: 2025-01-13
Payer: MEDICARE

## 2025-01-13 RX ORDER — SEMAGLUTIDE 0.5 MG/.5ML
0.5 INJECTION, SOLUTION SUBCUTANEOUS WEEKLY
Qty: 2 ML | Refills: 2 | Status: SHIPPED | OUTPATIENT
Start: 2025-01-13 | End: 2025-04-13

## 2025-03-17 DIAGNOSIS — I10 ESSENTIAL HYPERTENSION: ICD-10-CM

## 2025-03-17 RX ORDER — MINOXIDIL 2.5 MG/1
2.5 TABLET ORAL DAILY
Qty: 90 TABLET | Refills: 1 | Status: SHIPPED | OUTPATIENT
Start: 2025-03-17

## 2025-03-19 ENCOUNTER — OFFICE VISIT (OUTPATIENT)
Dept: DERMATOLOGY | Facility: CLINIC | Age: 70
End: 2025-03-19
Payer: MEDICARE

## 2025-03-19 DIAGNOSIS — D36.9 ANGIOFIBROMA: ICD-10-CM

## 2025-03-19 DIAGNOSIS — D22.9 MULTIPLE BENIGN NEVI: ICD-10-CM

## 2025-03-19 DIAGNOSIS — L81.4 LENTIGO: ICD-10-CM

## 2025-03-19 DIAGNOSIS — Z85.828 HISTORY OF SKIN CANCER: Primary | ICD-10-CM

## 2025-03-19 DIAGNOSIS — L73.8 SEBACEOUS HYPERPLASIA: ICD-10-CM

## 2025-03-19 DIAGNOSIS — L82.1 SEBORRHEIC KERATOSES: ICD-10-CM

## 2025-03-19 DIAGNOSIS — Z12.83 SCREENING EXAM FOR SKIN CANCER: ICD-10-CM

## 2025-03-19 DIAGNOSIS — D18.01 CHERRY ANGIOMA: ICD-10-CM

## 2025-03-19 DIAGNOSIS — Z85.828 HISTORY OF NONMELANOMA SKIN CANCER: ICD-10-CM

## 2025-03-19 PROCEDURE — 99999 PR PBB SHADOW E&M-EST. PATIENT-LVL III: CPT | Mod: PBBFAC,,, | Performed by: STUDENT IN AN ORGANIZED HEALTH CARE EDUCATION/TRAINING PROGRAM

## 2025-03-19 NOTE — PATIENT INSTRUCTIONS
Sunscreen Guidelines  Sunscreen protects your skin by absorbing and reflecting ultraviolet rays. All sunscreens have a sun protection factor (SPF) rating that indicates how long a sunscreen will remain effective on the skin.    Why protect your skin?  The sun's rays are composed of many different wavelengths, including UVA, UVB, and visible light that each affect the skin differently.    UVB: sunburn, photoaging, skin cancer (melanoma, basal cell, and squamous cell carcinomas) and modulation of the skin's immune system.    UVA: similar to above but thought to contribute more to aging; at the same dose of UVB it is less powerful however the sun has 10-20 times the levels of UVA as compared with UVB.  Visible light: implicated in causing unwanted darkening of skin, such as melasma and post-inflammatory hyperpigmentation in darker skin types     If I have dark skin, do I need to worry about the sun?    More darkly pigmented skin is more protected against UV-induced skin cancer, sunburn, and photoaging, though may still suffer from sun-related conditions, including melasma, hyperpigmentation, and other dark spots.    Sun avoidance  As a general rule, stay out of the sun as much as possible between 10 a.m. - 4 p.m.    Download the EPA UV index jennifer to track the UV index by hour in your zip code.      Which sunscreen should I choose?  The best sunscreen to use varies by individual. The one that feels best on your skin and fits your lifestyle will be the one you will likely use most regularly.   Active ingredients of sunscreen vary by , and may be a chemical (such as avobenzone or oxybenzone) or physical agent (such as zinc oxide or titanium dioxide). I recommend a physical agent.  A water-resistant sunscreen is one that maintains the SPF level after 40 minutes of water exposure. A very water-resistant sunscreen maintains the SPF level after 80 minutes of water exposure.    Sunscreen: this is the last layer in  "sun protection   Be generous: 1 shot glass of sunscreen for your body, ½ teaspoon for your face/neck  Reapply every 2 hours  Broad spectrum (provides UVA/UVB protection), SPF 50 or above  Avoid spray sunscreens: less effective and have been found to contain benzene (carcinogen)    Sun protective clothing  Although sunscreen helps minimize sun damage, no sunscreen completely blocks all wavelengths of UV light. Wearing sun protective clothing such as hats, rashguards or swim shirts, and long sleeves and/or pants, as well as avoiding sun exposure from 10 a.m. to 4 p.m. will help protect your skin from overexposure and minimize sun damage. Seek shade.  Long sleeved clothing, hats, and sunglasses: makes sun protection easier, more effective, and can even be more affordable, since sunscreen needs to be reapplied frequently.    Solumbra ([email or web address])  [employer] ([email or web address])  Coolibar ([email or web address])  [employer] ([email or web address])  Hats from [employer] ([email or web address])    My Favorite Sunscreens:  Physical blockers: Can have a "white case" but in general are more effective  - Face: CeraVe tinted mineral sunscreen, Bare Minerals complexion rescue (20 shades), Elta MD (UV elements, UV physical, UV restore, etc), Tizo ultra zinc tinted, Cetaphil Sheer Mineral Face Liquid Sunscreen  - Body: Blue Lizard, Neutrogena Sheer Zinc, Eucerin Daily Protection, Aveeno Baby   "

## 2025-03-19 NOTE — PROGRESS NOTES
Subjective:      Patient ID:  Jyoti Tyler is a 69 y.o. female who presents for   Chief Complaint   Patient presents with    Skin Check     Pt here for TBSE    Pt has h/o NMSC  - L upper abdomen- SCCIS- t/w efudex x 30 days    Pt denies any new changing, bleeding or growing lesions today.        Problem Noted   History of Skin Cancer 6/26/2024 4/2024: SCCis left upper abdomen s/p efudex bid x 4 weeks x 2 with residual. Treated with EDC 9/2024           Review of Systems    Objective:   Physical Exam   Constitutional: She appears well-developed and well-nourished. No distress.   Neurological: She is alert and oriented to person, place, and time. She is not disoriented.   Psychiatric: She has a normal mood and affect.   Skin:   Areas Examined (abnormalities noted in diagram):   Scalp / Hair Palpated and Inspected  Head / Face Inspection Performed  Neck Inspection Performed  Chest / Axilla Inspection Performed  Abdomen Inspection Performed  Back Inspection Performed  RUE Inspected  LUE Inspection Performed  RLE Inspected  LLE Inspection Performed  Nails and Digits Inspection Performed                     Diagram Legend     Erythematous scaling macule/papule c/w actinic keratosis       Vascular papule c/w angioma      Pigmented verrucoid papule/plaque c/w seborrheic keratosis      Yellow umbilicated papule c/w sebaceous hyperplasia      Irregularly shaped tan macule c/w lentigo     1-2 mm smooth white papules consistent with Milia      Movable subcutaneous cyst with punctum c/w epidermal inclusion cyst      Subcutaneous movable cyst c/w pilar cyst      Firm pink to brown papule c/w dermatofibroma      Pedunculated fleshy papule(s) c/w skin tag(s)      Evenly pigmented macule c/w junctional nevus     Mildly variegated pigmented, slightly irregular-bordered macule c/w mildly atypical nevus      Flesh colored to evenly pigmented papule c/w intradermal nevus       Pink pearly papule/plaque c/w basal cell carcinoma       Erythematous hyperkeratotic cursted plaque c/w SCC      Surgical scar with no sign of skin cancer recurrence      Open and closed comedones      Inflammatory papules and pustules      Verrucoid papule consistent consistent with wart     Erythematous eczematous patches and plaques     Dystrophic onycholytic nail with subungual debris c/w onychomycosis     Umbilicated papule    Erythematous-base heme-crusted tan verrucoid plaque consistent with inflamed seborrheic keratosis     Erythematous Silvery Scaling Plaque c/w Psoriasis     See annotation      Assessment / Plan:        Lentigo  New angioma  Sebaceous hyperplasia  Angiofibroma  Seborrheic keratoses  Multiple benign nevi  Reassurance given to patient. No treatment is necessary.   Treatment of benign, asymptomatic lesions may be considered cosmetic.    History of nonmelanoma skin cancer  Screening exam for skin cancer  Area of previous NMSC examined. Site well healed with no signs of recurrence.    Total body skin examination performed today including at least 12 points as noted in physical examination. No lesions suspicious for malignancy noted.    Recommend daily sun protection/avoidance, use of at least SPF 30, broad spectrum sunscreen (OTC drug), skin self examinations, and routine physician surveillance to optimize early detection             Follow up in about 6 months (around 9/19/2025) for TBSE.

## 2025-03-31 ENCOUNTER — PATIENT MESSAGE (OUTPATIENT)
Dept: PRIMARY CARE CLINIC | Facility: CLINIC | Age: 70
End: 2025-03-31
Payer: MEDICARE

## 2025-03-31 DIAGNOSIS — I77.9 MILD CAROTID ARTERY DISEASE: ICD-10-CM

## 2025-03-31 DIAGNOSIS — E78.2 MIXED HYPERLIPIDEMIA: ICD-10-CM

## 2025-03-31 RX ORDER — ROSUVASTATIN CALCIUM 5 MG/1
5 TABLET, COATED ORAL DAILY
Qty: 90 TABLET | Refills: 3 | Status: SHIPPED | OUTPATIENT
Start: 2025-03-31

## 2025-03-31 RX ORDER — ROSUVASTATIN CALCIUM 5 MG/1
5 TABLET, COATED ORAL
Qty: 90 TABLET | Refills: 3 | Status: SHIPPED | OUTPATIENT
Start: 2025-03-31 | End: 2025-03-31 | Stop reason: SDUPTHER

## 2025-04-21 RX ORDER — SEMAGLUTIDE 0.5 MG/.5ML
0.5 INJECTION, SOLUTION SUBCUTANEOUS WEEKLY
Qty: 6 ML | Refills: 1 | Status: SHIPPED | OUTPATIENT
Start: 2025-04-21 | End: 2025-10-18

## 2025-04-28 DIAGNOSIS — Z00.00 ENCOUNTER FOR MEDICARE ANNUAL WELLNESS EXAM: ICD-10-CM

## 2025-05-05 RX ORDER — PROPRANOLOL HYDROCHLORIDE 20 MG/1
TABLET ORAL
Qty: 180 TABLET | Refills: 1 | Status: SHIPPED | OUTPATIENT
Start: 2025-05-05

## 2025-05-21 ENCOUNTER — OFFICE VISIT (OUTPATIENT)
Dept: PRIMARY CARE CLINIC | Facility: CLINIC | Age: 70
End: 2025-05-21
Payer: MEDICARE

## 2025-05-21 VITALS
WEIGHT: 148.81 LBS | HEART RATE: 70 BPM | OXYGEN SATURATION: 99 % | BODY MASS INDEX: 25.54 KG/M2 | DIASTOLIC BLOOD PRESSURE: 65 MMHG | SYSTOLIC BLOOD PRESSURE: 112 MMHG

## 2025-05-21 DIAGNOSIS — E78.2 MIXED HYPERLIPIDEMIA: ICD-10-CM

## 2025-05-21 DIAGNOSIS — G25.0 BENIGN ESSENTIAL TREMOR: ICD-10-CM

## 2025-05-21 DIAGNOSIS — I10 ESSENTIAL HYPERTENSION: Primary | ICD-10-CM

## 2025-05-21 DIAGNOSIS — L65.8 FEMALE PATTERN HAIR LOSS: ICD-10-CM

## 2025-05-21 DIAGNOSIS — M81.0 AGE-RELATED OSTEOPOROSIS WITHOUT CURRENT PATHOLOGICAL FRACTURE: ICD-10-CM

## 2025-05-21 DIAGNOSIS — E87.6 HYPOKALEMIA: ICD-10-CM

## 2025-05-21 DIAGNOSIS — Z12.31 SCREENING MAMMOGRAM FOR BREAST CANCER: ICD-10-CM

## 2025-05-21 PROCEDURE — 3288F FALL RISK ASSESSMENT DOCD: CPT | Mod: CPTII,S$GLB,, | Performed by: STUDENT IN AN ORGANIZED HEALTH CARE EDUCATION/TRAINING PROGRAM

## 2025-05-21 PROCEDURE — 1159F MED LIST DOCD IN RCRD: CPT | Mod: CPTII,S$GLB,, | Performed by: STUDENT IN AN ORGANIZED HEALTH CARE EDUCATION/TRAINING PROGRAM

## 2025-05-21 PROCEDURE — 1101F PT FALLS ASSESS-DOCD LE1/YR: CPT | Mod: CPTII,S$GLB,, | Performed by: STUDENT IN AN ORGANIZED HEALTH CARE EDUCATION/TRAINING PROGRAM

## 2025-05-21 PROCEDURE — 1160F RVW MEDS BY RX/DR IN RCRD: CPT | Mod: CPTII,S$GLB,, | Performed by: STUDENT IN AN ORGANIZED HEALTH CARE EDUCATION/TRAINING PROGRAM

## 2025-05-21 PROCEDURE — 1126F AMNT PAIN NOTED NONE PRSNT: CPT | Mod: CPTII,S$GLB,, | Performed by: STUDENT IN AN ORGANIZED HEALTH CARE EDUCATION/TRAINING PROGRAM

## 2025-05-21 PROCEDURE — 99999 PR PBB SHADOW E&M-EST. PATIENT-LVL III: CPT | Mod: PBBFAC,,, | Performed by: STUDENT IN AN ORGANIZED HEALTH CARE EDUCATION/TRAINING PROGRAM

## 2025-05-21 PROCEDURE — 99214 OFFICE O/P EST MOD 30 MIN: CPT | Mod: S$GLB,,, | Performed by: STUDENT IN AN ORGANIZED HEALTH CARE EDUCATION/TRAINING PROGRAM

## 2025-05-21 PROCEDURE — 3008F BODY MASS INDEX DOCD: CPT | Mod: CPTII,S$GLB,, | Performed by: STUDENT IN AN ORGANIZED HEALTH CARE EDUCATION/TRAINING PROGRAM

## 2025-05-21 PROCEDURE — 3078F DIAST BP <80 MM HG: CPT | Mod: CPTII,S$GLB,, | Performed by: STUDENT IN AN ORGANIZED HEALTH CARE EDUCATION/TRAINING PROGRAM

## 2025-05-21 PROCEDURE — 3074F SYST BP LT 130 MM HG: CPT | Mod: CPTII,S$GLB,, | Performed by: STUDENT IN AN ORGANIZED HEALTH CARE EDUCATION/TRAINING PROGRAM

## 2025-05-21 PROCEDURE — 1123F ACP DISCUSS/DSCN MKR DOCD: CPT | Mod: CPTII,S$GLB,, | Performed by: STUDENT IN AN ORGANIZED HEALTH CARE EDUCATION/TRAINING PROGRAM

## 2025-05-21 NOTE — ASSESSMENT & PLAN NOTE
Her Bp was low last time so I stopped spironolactone and continued hctz per patients request because she wanted to be on HCTZ for the fluid retention in her ankles. Her potassium does drop lower since then. She is on potassium 20 meq bid. Continue hctz, will monitor

## 2025-05-21 NOTE — ASSESSMENT & PLAN NOTE
Likely due to hctz and presented after stopping spironolactone but pt wants to be on hctz. She is taking potassium 20 meq bid. Continue, will monitor

## 2025-05-21 NOTE — ASSESSMENT & PLAN NOTE
Ordered dexa today. Has osteoporosis at femoral neck. Not on treatment. Discussed doing weight bearing exercises, taking vitamin d3 daily and eating calcium in her diet.

## 2025-05-21 NOTE — PROGRESS NOTES
Clinic Note  5/21/2025      Subjective:       Patient ID:  Jyoti is a 69 y.o. female being seen for a f/u visit    Chief Complaint: Tremors    HPI  Jyoti Tyler is a 69 y.o.  female who presents with allergies, carotid artery disease (20-39%), HTN, osteoporosis, here for a f/u visit. Did acp 2025  -her BP is good since stopping spironolactone. Not low anymore  -ordered mammogram and dexa today. Has osteoporosis at femoral neck. Not on treatment. Discussed doing weight bearing exercises, taking vitamin d3 daily and eating calcium in her diet.   -did not lose any more weight but did not gain. She is maintaining her weight. She is doing weight lifting once a week. She is interested in our health . Will put her on the list.   -no falls in the last year  -no hospitalizations     Advance Care Planning     Date: 05/21/2025    Power of   I initiated the process of voluntary advance care planning today and explained the importance of this process to the patient.  I introduced the concept of advance directives to the patient, as well. Then the patient received detailed information about the importance of designating a Health Care Power of  (HCPOA). She was also instructed to communicate with this person about their wishes for future healthcare, should she become sick and lose decision-making capacity. The patient has previously appointed a HCPOA. After our discussion, the patient has decided to complete a HCPOA and has appointed her son, health care agent: Daron Tyler & health care agent number: [phone]. I encouraged her to communicate with this person about their wishes for future healthcare, should she become sick and lose decision-making capacity.      A total of 5 min was spent on advance care planning, goals of care discussion, emotional support, formulating and communicating prognosis and exploring burden/benefit of various approaches of treatment. This discussion occurred on a  fully voluntary basis with the verbal consent of the patient and/or family.          Review of Systems   Constitutional:  Negative for chills, fever and weight loss.   HENT:  Negative for congestion.    Respiratory:  Negative for cough and shortness of breath.    Cardiovascular:  Negative for chest pain and claudication.   Gastrointestinal:  Negative for abdominal pain, blood in stool, constipation and diarrhea.   Genitourinary:  Negative for dysuria and hematuria.   Neurological:  Positive for tremors. Negative for dizziness and headaches.       Medication List with Changes/Refills   Current Medications    ESTRADIOL (VIVELLE-DOT) 0.025 MG/24 HR    Apply patch twice a week (Monday and Thursday)    FLUOROURACIL (EFUDEX) 5 % CREAM    Apply topically 2 (two) times daily. Apply to skin cancer on abdomen for 4 weeks    FLUTICASONE PROPIONATE (FLONASE) 50 MCG/ACTUATION NASAL SPRAY    SPRAY 2 SPRAYS BY EACH NOSTRIL ROUTE ONCE DAILY.    HYDROCHLOROTHIAZIDE (HYDRODIURIL) 25 MG TABLET    Take 1 tablet (25 mg total) by mouth once daily.    MINOXIDIL (LONITEN) 2.5 MG TABLET    Take 1 tablet (2.5 mg total) by mouth once daily.    POTASSIUM CHLORIDE SA (K-DUR,KLOR-CON) 20 MEQ TABLET    Take 1 tablet (20 mEq total) by mouth 2 (two) times daily.    PROGESTERONE (PROMETRIUM) 100 MG CAPSULE    Take 1 capsule (100 mg total) by mouth every evening.    PROPRANOLOL (INDERAL) 20 MG TABLET    TAKE 1 TABLET (20 MG TOTAL) BY MOUTH 2 (TWO) TIMES DAILY AS NEEDED (TREMOR).    ROSUVASTATIN (CRESTOR) 5 MG TABLET    Take 1 tablet (5 mg total) by mouth once daily.    SEMAGLUTIDE, WEIGHT LOSS, (WEGOVY) 0.5 MG/0.5 ML PNIJ    Inject 0.5 mg into the skin once a week.    VITAMIN D (VITAMIN D3) 1000 UNITS TAB    Take 1,000 Units by mouth once daily.           Objective:      /65 (BP Location: Right arm, Patient Position: Sitting)   Pulse 70   Wt 67.5 kg (148 lb 13 oz)   LMP 01/01/2000 (Approximate)   SpO2 99%   BMI 25.54 kg/m²   Estimated body  "mass index is 25.54 kg/m² as calculated from the following:    Height as of 11/20/24: 5' 4" (1.626 m).    Weight as of this encounter: 67.5 kg (148 lb 13 oz).  Physical Exam  Constitutional:       Appearance: Normal appearance.   HENT:      Right Ear: Tympanic membrane normal. There is no impacted cerumen.      Left Ear: Tympanic membrane normal. There is no impacted cerumen.      Mouth/Throat:      Mouth: Mucous membranes are moist.      Pharynx: Oropharynx is clear.   Eyes:      Conjunctiva/sclera: Conjunctivae normal.   Neck:      Vascular: No carotid bruit.   Cardiovascular:      Rate and Rhythm: Normal rate and regular rhythm.      Pulses: Normal pulses.      Heart sounds: Normal heart sounds.   Pulmonary:      Effort: Pulmonary effort is normal. No respiratory distress.      Breath sounds: Normal breath sounds.   Abdominal:      General: Bowel sounds are normal.   Musculoskeletal:      Right lower leg: No edema.      Left lower leg: No edema.   Lymphadenopathy:      Cervical: No cervical adenopathy.   Skin:     General: Skin is warm.   Neurological:      Mental Status: She is alert and oriented to person, place, and time.   Psychiatric:         Mood and Affect: Mood normal.         Behavior: Behavior normal.           Assessment and Plan:     1. Essential hypertension  Overview:  2012    Assessment & Plan:  Her Bp was low last time so I stopped spironolactone and continued hctz per patients request because she wanted to be on HCTZ for the fluid retention in her ankles. Her potassium does drop lower since then. She is on potassium 20 meq bid. Continue hctz, will monitor     Orders:  -     Basic Metabolic Panel; Future; Expected date: 05/21/2025  -     CBC Without Differential; Future; Expected date: 05/21/2025    2. Hypokalemia  Assessment & Plan:  Likely due to hctz and presented after stopping spironolactone but pt wants to be on hctz. She is taking potassium 20 meq bid. Continue, will monitor       3. " Age-related osteoporosis without current pathological fracture  Assessment & Plan:  Ordered dexa today. Has osteoporosis at femoral neck. Not on treatment. Discussed doing weight bearing exercises, taking vitamin d3 daily and eating calcium in her diet.    Orders:  -     DXA Bone Density Axial Skeleton 1 or more sites; Future; Expected date: 05/21/2025    4. Screening mammogram for breast cancer  -     Mammo Digital Screening Bilat w/ Sunny (XPD); Future; Expected date: 05/24/2025    5. Female pattern hair loss  Assessment & Plan:  Patient takes minoxidil daily. Sees Dr. Jamee Velázquez for this. Continue.       6. Benign essential tremor  Assessment & Plan:  Taking 30 mg every morning. Not having to take it twice a day. States it helps. Continue.       7. Mixed hyperlipidemia  -     Lipid Panel; Future; Expected date: 05/21/2025         Follow Up:   Follow up in about 6 months (around 11/21/2025).    Other Orders Placed This Visit:  Orders Placed This Encounter   Procedures    Mammo Digital Screening Bilat w/ Sunny (XPD)    DXA Bone Density Axial Skeleton 1 or more sites    Basic Metabolic Panel    CBC Without Differential    Lipid Panel         Kt Lux

## 2025-05-28 ENCOUNTER — LAB VISIT (OUTPATIENT)
Dept: LAB | Facility: HOSPITAL | Age: 70
End: 2025-05-28
Attending: STUDENT IN AN ORGANIZED HEALTH CARE EDUCATION/TRAINING PROGRAM
Payer: MEDICARE

## 2025-05-28 ENCOUNTER — RESULTS FOLLOW-UP (OUTPATIENT)
Dept: PRIMARY CARE CLINIC | Facility: CLINIC | Age: 70
End: 2025-05-28

## 2025-05-28 DIAGNOSIS — E78.2 MIXED HYPERLIPIDEMIA: ICD-10-CM

## 2025-05-28 DIAGNOSIS — R92.8 ABNORMAL MAMMOGRAM OF RIGHT BREAST: Primary | ICD-10-CM

## 2025-05-28 DIAGNOSIS — I10 ESSENTIAL HYPERTENSION: ICD-10-CM

## 2025-05-28 LAB
ANION GAP (OHS): 9 MMOL/L (ref 8–16)
BUN SERPL-MCNC: 18 MG/DL (ref 8–23)
CALCIUM SERPL-MCNC: 9 MG/DL (ref 8.7–10.5)
CHLORIDE SERPL-SCNC: 102 MMOL/L (ref 95–110)
CHOLEST SERPL-MCNC: 140 MG/DL (ref 120–199)
CHOLEST/HDLC SERPL: 2.7 {RATIO} (ref 2–5)
CO2 SERPL-SCNC: 27 MMOL/L (ref 23–29)
CREAT SERPL-MCNC: 0.7 MG/DL (ref 0.5–1.4)
ERYTHROCYTE [DISTWIDTH] IN BLOOD BY AUTOMATED COUNT: 13.3 % (ref 11.5–14.5)
GFR SERPLBLD CREATININE-BSD FMLA CKD-EPI: >60 ML/MIN/1.73/M2
GLUCOSE SERPL-MCNC: 93 MG/DL (ref 70–110)
HCT VFR BLD AUTO: 42.2 % (ref 37–48.5)
HDLC SERPL-MCNC: 51 MG/DL (ref 40–75)
HDLC SERPL: 36.4 % (ref 20–50)
HGB BLD-MCNC: 13.9 GM/DL (ref 12–16)
LDLC SERPL CALC-MCNC: 74.4 MG/DL (ref 63–159)
MCH RBC QN AUTO: 28.3 PG (ref 27–31)
MCHC RBC AUTO-ENTMCNC: 32.9 G/DL (ref 32–36)
MCV RBC AUTO: 86 FL (ref 82–98)
NONHDLC SERPL-MCNC: 89 MG/DL
PLATELET # BLD AUTO: 220 K/UL (ref 150–450)
PMV BLD AUTO: 9 FL (ref 9.2–12.9)
POTASSIUM SERPL-SCNC: 3.5 MMOL/L (ref 3.5–5.1)
RBC # BLD AUTO: 4.91 M/UL (ref 4–5.4)
SODIUM SERPL-SCNC: 138 MMOL/L (ref 136–145)
TRIGL SERPL-MCNC: 73 MG/DL (ref 30–150)
WBC # BLD AUTO: 6.89 K/UL (ref 3.9–12.7)

## 2025-05-28 PROCEDURE — 80048 BASIC METABOLIC PNL TOTAL CA: CPT

## 2025-05-28 PROCEDURE — 36415 COLL VENOUS BLD VENIPUNCTURE: CPT

## 2025-05-28 PROCEDURE — 85027 COMPLETE CBC AUTOMATED: CPT

## 2025-05-28 PROCEDURE — 80061 LIPID PANEL: CPT

## 2025-06-11 ENCOUNTER — HOSPITAL ENCOUNTER (OUTPATIENT)
Dept: RADIOLOGY | Facility: HOSPITAL | Age: 70
Discharge: HOME | End: 2025-06-11
Attending: STUDENT IN AN ORGANIZED HEALTH CARE EDUCATION/TRAINING PROGRAM
Payer: MEDICARE

## 2025-06-11 ENCOUNTER — HOSPITAL ENCOUNTER (OUTPATIENT)
Dept: RADIOLOGY | Facility: CLINIC | Age: 70
Discharge: HOME | End: 2025-06-11
Attending: STUDENT IN AN ORGANIZED HEALTH CARE EDUCATION/TRAINING PROGRAM
Payer: MEDICARE

## 2025-06-11 ENCOUNTER — RESULTS FOLLOW-UP (OUTPATIENT)
Dept: PRIMARY CARE CLINIC | Facility: CLINIC | Age: 70
End: 2025-06-11

## 2025-06-11 DIAGNOSIS — Z12.31 SCREENING MAMMOGRAM FOR BREAST CANCER: ICD-10-CM

## 2025-06-11 DIAGNOSIS — M81.0 AGE-RELATED OSTEOPOROSIS WITHOUT CURRENT PATHOLOGICAL FRACTURE: ICD-10-CM

## 2025-06-11 PROCEDURE — 77067 SCR MAMMO BI INCL CAD: CPT | Mod: TC

## 2025-06-11 PROCEDURE — 77092 TBS I&R FX RSK QHP: CPT | Mod: S$GLB,,, | Performed by: INTERNAL MEDICINE

## 2025-06-11 PROCEDURE — 77091 TBS TECHL CALCULATION ONLY: CPT

## 2025-06-11 PROCEDURE — 77067 SCR MAMMO BI INCL CAD: CPT | Mod: 26,,, | Performed by: RADIOLOGY

## 2025-06-11 PROCEDURE — 77063 BREAST TOMOSYNTHESIS BI: CPT | Mod: 26,,, | Performed by: RADIOLOGY

## 2025-06-11 PROCEDURE — 77080 DXA BONE DENSITY AXIAL: CPT | Mod: 26,,, | Performed by: INTERNAL MEDICINE

## 2025-06-16 ENCOUNTER — TELEPHONE (OUTPATIENT)
Dept: RADIOLOGY | Facility: HOSPITAL | Age: 70
End: 2025-06-16
Payer: MEDICARE

## 2025-06-16 NOTE — TELEPHONE ENCOUNTER
Spoke with patient and explained mammogram findings.Patient expressed understanding of results. Patient scheduled abnormal mammogram follow up appointment at [facility] on June 19, 2025.

## 2025-06-19 ENCOUNTER — HOSPITAL ENCOUNTER (OUTPATIENT)
Dept: RADIOLOGY | Facility: HOSPITAL | Age: 70
Discharge: HOME | End: 2025-06-19
Attending: STUDENT IN AN ORGANIZED HEALTH CARE EDUCATION/TRAINING PROGRAM
Payer: MEDICARE

## 2025-06-19 ENCOUNTER — TELEPHONE (OUTPATIENT)
Dept: PRIMARY CARE CLINIC | Facility: CLINIC | Age: 70
End: 2025-06-19
Payer: MEDICARE

## 2025-06-19 ENCOUNTER — PATIENT MESSAGE (OUTPATIENT)
Dept: PRIMARY CARE CLINIC | Facility: CLINIC | Age: 70
End: 2025-06-19
Payer: MEDICARE

## 2025-06-19 ENCOUNTER — RESULTS FOLLOW-UP (OUTPATIENT)
Dept: PRIMARY CARE CLINIC | Facility: CLINIC | Age: 70
End: 2025-06-19

## 2025-06-19 DIAGNOSIS — R92.8 ABNORMAL MAMMOGRAM OF RIGHT BREAST: Primary | ICD-10-CM

## 2025-06-19 DIAGNOSIS — R92.8 ABNORMAL MAMMOGRAM OF RIGHT BREAST: ICD-10-CM

## 2025-06-19 PROCEDURE — 76642 ULTRASOUND BREAST LIMITED: CPT | Mod: 26,RT,, | Performed by: RADIOLOGY

## 2025-06-19 PROCEDURE — 76642 ULTRASOUND BREAST LIMITED: CPT | Mod: TC,RT

## 2025-06-19 PROCEDURE — 77061 BREAST TOMOSYNTHESIS UNI: CPT | Mod: 26,RT,, | Performed by: RADIOLOGY

## 2025-06-19 PROCEDURE — 77065 DX MAMMO INCL CAD UNI: CPT | Mod: TC,RT

## 2025-06-19 PROCEDURE — 77065 DX MAMMO INCL CAD UNI: CPT | Mod: 26,RT,, | Performed by: RADIOLOGY

## 2025-06-19 NOTE — TELEPHONE ENCOUNTER
Call to patient to let patient know that looks the lesion they saw on your right breast is most likely benign, seems to be a cyst but they want you to have another diagnostic mammogram and ultrasound in 6 months. Provider already ordered them, pls help her schedule it for 6 months out.     Scheduled for 12/19/2025, patient agreed

## 2025-06-19 NOTE — TELEPHONE ENCOUNTER
----- Message from Kt Lux MD sent at 6/19/2025 11:11 AM CDT -----  Pls let patient know that looks the lesion they saw on her right breast is most likely benign, seems to be a cyst but they want her to have another diagnostic mammogram and ultrasound in 6 months. I   already ordered them, pls help her schedule it for 6 months out.   ----- Message -----  From: Anibal Navas MD  Sent: 6/19/2025  10:16 AM CDT  To: Kt Lux MD

## 2025-06-24 RX ORDER — POTASSIUM CHLORIDE 20 MEQ/1
20 TABLET, EXTENDED RELEASE ORAL 2 TIMES DAILY
Qty: 180 TABLET | Refills: 2 | Status: SHIPPED | OUTPATIENT
Start: 2025-06-24

## 2025-07-30 ENCOUNTER — PATIENT MESSAGE (OUTPATIENT)
Dept: DERMATOLOGY | Facility: CLINIC | Age: 70
End: 2025-07-30
Payer: MEDICARE

## 2025-08-03 DIAGNOSIS — N95.1 MENOPAUSAL SYMPTOMS: ICD-10-CM

## 2025-08-04 RX ORDER — PROGESTERONE 100 MG/1
100 CAPSULE ORAL NIGHTLY
Qty: 90 CAPSULE | Refills: 0 | Status: SHIPPED | OUTPATIENT
Start: 2025-08-04

## (undated) DEVICE — SOL BETADINE 5%

## (undated) DEVICE — CASSETTE INFINITI

## (undated) DEVICE — GLOVE BIOGEL SKINSENSE PI 6.5

## (undated) DEVICE — GLASSES EYE PROTECTIVE

## (undated) DEVICE — GLOVE BIOGEL SKINSENSE PI 7.5

## (undated) DEVICE — Device

## (undated) DEVICE — SYR SLIP TIP 1CC